# Patient Record
Sex: FEMALE | Race: WHITE | NOT HISPANIC OR LATINO | Employment: OTHER | URBAN - METROPOLITAN AREA
[De-identification: names, ages, dates, MRNs, and addresses within clinical notes are randomized per-mention and may not be internally consistent; named-entity substitution may affect disease eponyms.]

---

## 2017-06-02 ENCOUNTER — OFFICE VISIT (OUTPATIENT)
Dept: CARDIOLOGY | Facility: CLINIC | Age: 77
End: 2017-06-02
Attending: INTERNAL MEDICINE
Payer: COMMERCIAL

## 2017-06-02 VITALS
HEIGHT: 60 IN | HEART RATE: 72 BPM | WEIGHT: 110.4 LBS | SYSTOLIC BLOOD PRESSURE: 177 MMHG | BODY MASS INDEX: 21.68 KG/M2 | DIASTOLIC BLOOD PRESSURE: 72 MMHG

## 2017-06-02 DIAGNOSIS — I10 ESSENTIAL HYPERTENSION WITH GOAL BLOOD PRESSURE LESS THAN 140/90: Primary | ICD-10-CM

## 2017-06-02 DIAGNOSIS — I49.49 PREMATURE BEATS: ICD-10-CM

## 2017-06-02 PROCEDURE — 99214 OFFICE O/P EST MOD 30 MIN: CPT | Mod: 25 | Performed by: PHYSICIAN ASSISTANT

## 2017-06-02 PROCEDURE — 93000 ELECTROCARDIOGRAM COMPLETE: CPT | Performed by: PHYSICIAN ASSISTANT

## 2017-06-02 RX ORDER — ATENOLOL 50 MG/1
50 TABLET ORAL 2 TIMES DAILY
Qty: 180 TABLET | Refills: 3 | Status: SHIPPED | OUTPATIENT
Start: 2017-06-02 | End: 2018-08-27

## 2017-06-02 NOTE — PROGRESS NOTES
HPI and Plan:   See dictation #[id number]    Orders Placed This Encounter   Procedures     EKG 12-lead complete w/read - Clinics       Orders Placed This Encounter   Medications     atenolol (TENORMIN) 50 MG tablet     Sig: Take 1 tablet (50 mg) by mouth 2 times daily     Dispense:  180 tablet     Refill:  3       Medications Discontinued During This Encounter   Medication Reason     atenolol (TENORMIN) 50 MG tablet Reorder       Encounter Diagnoses   Name Primary?     Premature beats      Essential hypertension with goal blood pressure less than 140/90 Yes       CURRENT MEDICATIONS:  Current Outpatient Prescriptions   Medication Sig Dispense Refill     atenolol (TENORMIN) 50 MG tablet Take 1 tablet (50 mg) by mouth 2 times daily 180 tablet 3     Calcium Carbonate-Vitamin D (CALCIUM + D PO) Take  by mouth.       Cholecalciferol (VITAMIN D PO) Take  by mouth.       [DISCONTINUED] atenolol (TENORMIN) 50 MG tablet Take 1 tablet (50 mg) by mouth daily 90 tablet 3       ALLERGIES     Allergies   Allergen Reactions     Sulfa Drugs Rash     Hctz      Cough, dehydration, CROSS-REACTION with sulfa allergy       Darvon [Propoxyphene Hcl] Other (See Comments)     Stiff neck         PAST MEDICAL HISTORY:  Past Medical History:   Diagnosis Date     Arm fracture, right 12/11     Essential hypertension, benign      Hypertension      PAC (premature atrial contraction)      Pneumonia 1988     PVC (premature ventricular contraction)      Recurrent Otitis media        PAST SURGICAL HISTORY:  Past Surgical History:   Procedure Laterality Date     OPEN REDUCTION INTERNAL FIXATION HUMERUS PROXIMAL  12/27/2011    Procedure:OPEN REDUCTION INTERNAL FIXATION HUMERUS PROXIMAL; OPEN REDUCTION INTERNAL FIXATION RIGHT PROXIMAL HUMERUS ; Surgeon:OCTAVIO WRIGHT; Location:[facility] OR     TONSILLECTOMY  as child       FAMILY HISTORY:  Family History   Problem Relation Age of Onset     CEREBROVASCULAR DISEASE Mother      age 83     CEREBROVASCULAR DISEASE  Father      age  82       SOCIAL HISTORY:  Social History     Social History     Marital status:      Spouse name: N/A     Number of children: N/A     Years of education: N/A     Occupational History     retired      Social History Main Topics     Smoking status: Never Smoker     Smokeless tobacco: Never Used     Alcohol use No     Drug use: No     Sexual activity: Not Currently     Partners: Male     Other Topics Concern     None     Social History Narrative       Review of Systems:  Skin:  Negative       Eyes:  Positive for glasses    ENT:  Negative      Respiratory:  Negative for dyspnea on exertion;shortness of breath     Cardiovascular:  Negative for;chest pain;syncope or near-syncope;exercise intolerance;fatigue;lightheadedness;dizziness Positive for;palpitations;edema Occasional L LE edema  Gastroenterology: Negative      Genitourinary:  Negative      Musculoskeletal:  Negative      Neurologic:  Negative      Psychiatric:  Positive for excessive stress  dx'd with kidney disfunction  Heme/Lymph/Imm:  Negative      Endocrine:  Negative        Physical Exam:  Vitals: /72  Pulse 72  Ht 1.524 m (5')  Wt 50.1 kg (110 lb 6.4 oz)  BMI 21.56 kg/m2    Constitutional:  cooperative, alert and oriented, well developed, well nourished, in no acute distress        Skin:  warm and dry to the touch   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)    Head:  normocephalic, no masses or lesions        Eyes:  pupils equal and round;conjunctivae and lids unremarkable;sclera white        ENT:  no pallor or cyanosis, dentition good        Neck:  JVP normal;no carotid bruit        Chest:  normal breath sounds, clear to auscultation, normal A-P diameter, normal symmetry, normal respiratory excursion, no use of accessory muscles          Cardiac: regular rhythm, normal S1/S2, no S3 or S4, apical impulse not displaced, no murmurs, gallops or rubs                  Abdomen:  abdomen soft        Vascular: pulses full and  equal                                        Extremities and Back:  no deformities, clubbing, cyanosis, erythema observed;no edema              Neurological:  affect appropriate, oriented to time, person and place

## 2017-06-02 NOTE — LETTER
6/2/2017     [facility] [place]    RE: Karen Sanchez       Dear Colleague,    I had the pleasure of meeting Karen today when she came for annual followup.  She is a very pleasant 76-year-old who has a history of symptomatic PACs and PVCs, well controlled on atenolol.  She saw Dr. Jackson 1 year ago, at which time he recommended discontinuation of aspirin and no other medication changes were made.      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  She has had a Mohs procedure and is going back to Dermatology in 6 months for a full body scan.      She thinks her palpitations continue to remain under great control on atenolol.  She denies any problems with orthopnea or PND but does occasionally note some left lower extremity edema.  She denies chest pain, pressure or tightness.  She remains quite active without limitation.  She enjoys sewing and states that she has really done pretty well this past year; however, she notes that she has been stressed this spring with her cancer diagnosis and some trouble with her  having some bursitis.        EKG today, which I overread, confirms sinus rhythm at 71 beats per minute with normal intervals.      Stress echocardiogram done 12/2011 was negative for wall motion abnormalities.  Last echo was done in 08/2007 showing an EF of 55%-60%.     Outpatient Encounter Prescriptions as of 6/2/2017   Medication Sig Dispense Refill     atenolol (TENORMIN) 50 MG tablet Take 1 tablet (50 mg) by mouth 2 times daily 180 tablet 3     Calcium Carbonate-Vitamin D (CALCIUM + D PO) Take  by mouth.       Cholecalciferol (VITAMIN D PO) Take  by mouth.       [DISCONTINUED] atenolol (TENORMIN) 50 MG tablet Take 1 tablet (50 mg) by mouth daily 90 tablet 3     No facility-administered encounter medications on file as of 6/2/2017.       ASSESSMENT AND PLAN:     1.  Ectopy.  She thinks the atenolol 50 mg daily is working well to control her PACs and PVCs.  She  has never been diagnosed to have anything more concerning such as atrial fibrillation.  She will continue to monitor.     2.  Hypertension.  Blood pressure remains elevated when we rechecked it in clinic.  At home she is getting anywhere   from the 120-160s, with the vast majority of them in the 140s.  At this time, we will start by increasing her atenolol.  She is nervous to double it completely, given that she does get some fatigue with atenolol (which is why she takes it at night).  At this time, we will start with just 25 mg in the morning and 50 mg in the evening.  She will call me if she has any problems, certainly, but definitely will call me in 2 weeks with an update on blood pressures.      If blood pressures remain elevated in 2 weeks ,and she is feeling well, we could potentially increase atenolol to 50 mg twice daily.  If she does not feel well on the higher dose of atenolol, we can decrease her back to 50 mg in the evening and initiate a different antihypertensive in the morning such as lisinopril.  In the past she had problems with hydrochlorothiazide given her history of sulfa allergy, and we would certainly use something other than a diuretic.      It has been a pleasure to see her in clinic.  Dr. Jackson will see her in a year, but I may see her earlier depending on blood pressure.     Sincerely,    Saundra Remy PA-C     [facility]

## 2017-06-02 NOTE — MR AVS SNAPSHOT
After Visit Summary   6/2/2017    Karen Sanchez    MRN: [id number]           Patient Information     Date Of Birth          1940        Visit Information        Provider Department      6/2/2017 9:30 AM Saundra Remy PA-C [facility]        Today's Diagnoses     Essential hypertension with goal blood pressure less than 140/90    -  1    Premature beats          Care Instructions    1. EKG today looked great! Normal rhythm and no skipped/premature beats    2. BP high today!  As you're getting BPs 140s at home, will INCREASE atenolol.   Take 1/2 tab in AM (25 mg) and 1 tab in PM (50 mg) x 2 weeks. New Rx sent       3. Call my nurse the week of 6/12 or 6/19 and give update on BP readings.  Call earlier if any problems on the higher dose!  Lyndsey: [phone].  If BP still high and feeling OK, will increase atenolol again.  If not feeling OK or BPs not well controlled, will add medicine (not HCTZ)    4. See Dr. Jackson in 1 year but I might see you back for BP in the meantime.          Follow-ups after your visit        Who to contact     If you have questions or need follow up information about today's clinic visit or your schedule please contact [facility] directly at [phone].  Normal or non-critical lab and imaging results will be communicated to you by [x+1]hart, letter or phone within 4 business days after the clinic has received the results. If you do not hear from us within 7 days, please contact the clinic through for; to (do)t or phone. If you have a critical or abnormal lab result, we will notify you by phone as soon as possible.  Submit refill requests through [employer] or call your pharmacy and they will forward the refill request to us. Please allow 3 business days for your refill to be completed.          Additional Information About Your Visit        [x+1][employer] Information     [employer] lets you send  "messages to your doctor, view your test results, renew your prescriptions, schedule appointments and more. To sign up, go to [email or web address] . Click on \"Log in\" on the left side of the screen, which will take you to the Welcome page. Then click on \"Sign up Now\" on the right side of the page.     You will be asked to enter the access code listed below, as well as some personal information. Please follow the directions to create your username and password.     Your access code is: [email or web address]  Expires: 2017  9:57 AM     Your access code will  in 90 days. If you need help or a new code, please call your [place] clinic or [phone].        Care EveryWhere ID     This is your Care EveryWhere ID. This could be used by other organizations to access your [place] medical records  [id number]        Your Vitals Were     Pulse Height BMI (Body Mass Index)             70 1.524 m (5') 21.56 kg/m2          Blood Pressure from Last 3 Encounters:   17 182/60   16 130/74   16 (!) 160/100    Weight from Last 3 Encounters:   17 50.1 kg (110 lb 6.4 oz)   16 50.8 kg (112 lb)   16 51.7 kg (114 lb)              We Performed the Following     EKG 12-lead complete w/read - Clinics     Follow-Up with Cardiac Advanced Practice Provider          Today's Medication Changes          These changes are accurate as of: 17  9:57 AM.  If you have any questions, ask your nurse or doctor.               These medicines have changed or have updated prescriptions.        Dose/Directions    atenolol 50 MG tablet   Commonly known as:  TENORMIN   This may have changed:  when to take this   Used for:  Essential hypertension with goal blood pressure less than 140/90   Changed by:  Saundra Remy PA-C        Dose:  50 mg   Take 1 tablet (50 mg) by mouth 2 times daily   Quantity:  180 tablet   Refills:  3            Where to get your medicines      These medications were sent to " [id number] - [place], [place] - [facility] [facility] [address]  [address] [address], [facility] [address]     Phone:  [phone]     atenolol 50 MG tablet                Primary Care Provider    [facility] [place]       No address on file        Thank you!     Thank you for choosing [facility] [place]  for your care. Our goal is always to provide you with excellent care. Hearing back from our patients is one way we can continue to improve our services. Please take a few minutes to complete the written survey that you may receive in the mail after your visit with us. Thank you!             Your Updated Medication List - Protect others around you: Learn how to safely use, store and throw away your medicines at www.disposemymeds.org.          This list is accurate as of: 6/2/17  9:57 AM.  Always use your most recent med list.                   Brand Name Dispense Instructions for use    atenolol 50 MG tablet    TENORMIN    180 tablet    Take 1 tablet (50 mg) by mouth 2 times daily       CALCIUM + D PO      Take  by mouth.       VITAMIN D PO      Take  by mouth.

## 2017-06-02 NOTE — PATIENT INSTRUCTIONS
1. EKG today looked great! Normal rhythm and no skipped/premature beats    2. BP high today!  As you're getting BPs 140s at home, will INCREASE atenolol.   Take 1/2 tab in AM (25 mg) and 1 tab in PM (50 mg) x 2 weeks. New Rx sent       3. Call my nurse the week of 6/12 or 6/19 and give update on BP readings.  Call earlier if any problems on the higher dose!  Lyndsey: [phone].  If BP still high and feeling OK, will increase atenolol again.  If not feeling OK or BPs not well controlled, will add medicine (not HCTZ)    4. See Dr. Jackson in 1 year but I might see you back for BP in the meantime.

## 2017-06-03 NOTE — PROGRESS NOTES
HISTORY OF PRESENT ILLNESS:  I had the pleasure of meeting Karen today when she came for annual followup.  She is a very pleasant 76-year-old who has a history of symptomatic PACs and PVCs, well controlled on atenolol.  She saw Dr. Jackson 1 year ago, at which time he recommended discontinuation of aspirin and no other medication changes were made.      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  She has had a Mohs procedure and is going back to Dermatology in 6 months for a full body scan.      She thinks her palpitations continue to remain under great control on atenolol.  She denies any problems with orthopnea or PND but does occasionally note some left lower extremity edema.  She denies chest pain, pressure or tightness.  She remains quite active without limitation.  She enjoys sewing and states that she has really done pretty well this past year; however, she notes that she has been stressed this spring with her cancer diagnosis and some trouble with her  having some bursitis.        EKG today, which I overread, confirms sinus rhythm at 71 beats per minute with normal intervals.      Stress echocardiogram done 12/2011 was negative for wall motion abnormalities.  Last echo was done in 08/2007 showing an EF of 55%-60%.        ASSESSMENT AND PLAN:     1.  Ectopy.  She thinks the atenolol 50 mg daily is working well to control her PACs and PVCs.  She has never been diagnosed to have anything more concerning such as atrial fibrillation.  She will continue to monitor.     2.  Hypertension.  Blood pressure remains elevated when we rechecked it in clinic.  At home she is getting anywhere   from the 120-160s, with the vast majority of them in the 140s.  At this time, we will start by increasing her atenolol.  She is nervous to double it completely, given that she does get some fatigue with atenolol (which is why she takes it at night).  At this time, we will start with just 25 mg  in the morning and 50 mg in the evening.  She will call me if she has any problems, certainly, but definitely will call me in 2 weeks with an update on blood pressures.      If blood pressures remain elevated in 2 weeks ,and she is feeling well, we could potentially increase atenolol to 50 mg twice daily.  If she does not feel well on the higher dose of atenolol, we can decrease her back to 50 mg in the evening and initiate a different antihypertensive in the morning such as lisinopril.  In the past she had problems with hydrochlorothiazide given her history of sulfa allergy, and we would certainly use something other than a diuretic.      It has been a pleasure to see her in clinic.  Dr. Jackson will see her in a year, but I may see her earlier depending on blood pressure.         CY SANCHEZ PA-C             D: 2017 10:13   T: 2017 20:16   MT: REBECCA      Name:     PROMISE COLLIER   MRN:      [id number]        Account:      [id number]   :      1940           Service Date: 2017      Document: [id number]

## 2017-06-09 ENCOUNTER — RADIANT APPOINTMENT (OUTPATIENT)
Dept: GENERAL RADIOLOGY | Facility: CLINIC | Age: 77
End: 2017-06-09
Attending: PHYSICIAN ASSISTANT
Payer: COMMERCIAL

## 2017-06-09 ENCOUNTER — OFFICE VISIT (OUTPATIENT)
Dept: FAMILY MEDICINE | Facility: CLINIC | Age: 77
End: 2017-06-09
Payer: COMMERCIAL

## 2017-06-09 VITALS
WEIGHT: 110 LBS | BODY MASS INDEX: 21.48 KG/M2 | SYSTOLIC BLOOD PRESSURE: 151 MMHG | TEMPERATURE: 97.7 F | OXYGEN SATURATION: 98 % | HEART RATE: 65 BPM | DIASTOLIC BLOOD PRESSURE: 68 MMHG | RESPIRATION RATE: 16 BRPM

## 2017-06-09 DIAGNOSIS — M79.672 LEFT FOOT PAIN: ICD-10-CM

## 2017-06-09 DIAGNOSIS — I10 HYPERTENSION GOAL BP (BLOOD PRESSURE) < 140/90: ICD-10-CM

## 2017-06-09 DIAGNOSIS — Z12.11 SCREEN FOR COLON CANCER: ICD-10-CM

## 2017-06-09 DIAGNOSIS — L03.116 CELLULITIS OF LEFT LOWER EXTREMITY: ICD-10-CM

## 2017-06-09 DIAGNOSIS — M79.672 LEFT FOOT PAIN: Primary | ICD-10-CM

## 2017-06-09 PROCEDURE — 73630 X-RAY EXAM OF FOOT: CPT | Mod: LT

## 2017-06-09 PROCEDURE — 99214 OFFICE O/P EST MOD 30 MIN: CPT | Performed by: PHYSICIAN ASSISTANT

## 2017-06-09 RX ORDER — CEPHALEXIN 500 MG/1
500 CAPSULE ORAL 4 TIMES DAILY
Qty: 28 CAPSULE | Refills: 0 | Status: SHIPPED | OUTPATIENT
Start: 2017-06-09 | End: 2017-06-16

## 2017-06-09 NOTE — NURSING NOTE
Chief Complaint   Patient presents with     Musculoskeletal Problem       Initial /68 (Cuff Size: Adult Regular)  Pulse 65  Temp 97.7  F (36.5  C) (Tympanic)  Resp 16  Wt 110 lb (49.9 kg)  SpO2 98%  BMI 21.48 kg/m2 Estimated body mass index is 21.48 kg/(m^2) as calculated from the following:    Height as of 6/2/17: 5' (1.524 m).    Weight as of this encounter: 110 lb (49.9 kg).  Medication Reconciliation: complete   Lidia Quinn, CMA

## 2017-06-09 NOTE — PROGRESS NOTES
SUBJECTIVE:                                                    Karen Sanchez is a 76 year old female who presents to clinic today for the following health issues:      Musculoskeletal problem/pain      Duration: 3 days     Description  Location: left foot pain    Intensity:  moderate    Accompanying signs and symptoms: swelling    History  Previous similar problem: YES- several years ago  Previous evaluation:  none    Precipitating or alleviating factors:  Trauma or overuse: no   Aggravating factors include: standing and walking    Therapies tried and outcome: immobilization       Sudden onset of left midfoot pain, swelling and redness that began 3 days ago. No known injury.  Pain is worse with standing and walking, improves with rest. Patient is concerned today for stress fracture.     Problem list and histories reviewed & adjusted, as indicated.  Additional history: as documented    Patient Active Problem List   Diagnosis     CARDIOVASCULAR SCREENING; LDL GOAL LESS THAN 130     Foot fracture, right     Fracture, humerus, right     Hypertension goal BP (blood pressure) < 140/90     Arm fracture, right     Contracture of right elbow     Osteoporosis     SVT (supraventricular tachycardia) (H)     PVC (premature ventricular contraction)     Hypertension     Aortic valve disorder     Past Surgical History:   Procedure Laterality Date     OPEN REDUCTION INTERNAL FIXATION HUMERUS PROXIMAL  12/27/2011    Procedure:OPEN REDUCTION INTERNAL FIXATION HUMERUS PROXIMAL; OPEN REDUCTION INTERNAL FIXATION RIGHT PROXIMAL HUMERUS ; Surgeon:OCTAVIO WRIGHT; Location:[facility]     TONSILLECTOMY  as child       Social History   Substance Use Topics     Smoking status: Never Smoker     Smokeless tobacco: Never Used     Alcohol use No     Family History   Problem Relation Age of Onset     CEREBROVASCULAR DISEASE Mother      age 83     CEREBROVASCULAR DISEASE Father      age  82         Current Outpatient Prescriptions   Medication  Sig Dispense Refill     cephALEXin (KEFLEX) 500 MG capsule Take 1 capsule (500 mg) by mouth 4 times daily for 7 days 28 capsule 0     atenolol (TENORMIN) 50 MG tablet Take 1 tablet (50 mg) by mouth 2 times daily 180 tablet 3     Calcium Carbonate-Vitamin D (CALCIUM + D PO) Take  by mouth.       Cholecalciferol (VITAMIN D PO) Take  by mouth.       Allergies   Allergen Reactions     Sulfa Drugs Rash     Hctz      Cough, dehydration, CROSS-REACTION with sulfa allergy       Darvon [Propoxyphene Hcl] Other (See Comments)     Stiff neck         Reviewed and updated as needed this visit by clinical staff       Reviewed and updated as needed this visit by Provider         ROS:  Constitutional, HEENT, cardiovascular, pulmonary, GI, , musculoskeletal, neuro, skin, endocrine and psych systems are negative, except as otherwise noted.    OBJECTIVE:                                                    /68 (Cuff Size: Adult Regular)  Pulse 65  Temp 97.7  F (36.5  C) (Tympanic)  Resp 16  Wt 110 lb (49.9 kg)  SpO2 98%  BMI 21.48 kg/m2  Body mass index is 21.48 kg/(m^2).  GENERAL: healthy, alert and no distress  MS: mild 1 + pitting edema noted to left ankle,  2x2 area of erythema and swelling along left midfoot with associated TTP, no ankle TTP.  FROM of left ankle and toes without pain, 5/5 strength of left ankle and toes, full left pedal pulses  SKIN: See above  NEURO: Normal strength and tone, mentation intact and speech normal  PSYCH: mentation appears normal, affect normal/bright    Diagnostic Test Results:  none      ASSESSMENT/PLAN:                                                        1. Cellulitis of left lower extremity  Examination today is consistent with cellulitis of dorsum of left foot.  No tenderness along the ankle to suggest intraarticular involvement or gout.  X ray done today and was reviewed with patient present in the office.  No evidence of acute fracture.  I have advised that patient begin keflex  and follow up early next week if no improvement, may consider advanced imaging at that time to further assess bones of foot if no improvement  - cephALEXin (KEFLEX) 500 MG capsule; Take 1 capsule (500 mg) by mouth 4 times daily for 7 days  Dispense: 28 capsule; Refill: 0    2. Left foot pain  - XR Foot Left G/E 3 Views; Future    3. Hypertension goal BP (blood pressure) < 140/90  Repeat is 150/90, patient is following with cardiology and has been advised by cardiologist to call her in 1 week with readings.  She will follow up with this    4. Screen for colon cancer  - Fecal colorectal cancer screen (FIT); Future    See Patient Instructions    Saurabh Garza PA-C  [facility]

## 2017-06-09 NOTE — PATIENT INSTRUCTIONS
What Is High Blood Pressure?  High blood pressure (also called hypertension) is known as the  silent killer.  This is because most of the time it doesn t cause symptoms. In fact, many people don t know they have it until other problems develop. In most cases, high blood pressure can t be cured. It s a disease that requires lifelong treatment. The good news is that it CAN be managed.  Understanding blood pressure  The circulatory system is made up of the heart and blood vessels that carry blood through the body. Your heart is the pump for this system. With each heartbeat (contraction), the heart sends blood out through large blood vessels called arteries. Blood pressure is a measure of how hard the moving blood pushes against the walls of the arteries.          High blood pressure can harm your health  High blood pressure makes the heart work harder to pump blood. Frequent high blood pressure can also cause changes in the artery walls. The walls thicken and become rough, which leads to a buildup of plaque (a fatty material). This can damage the arteries. It can also reduce blood flow through the artery. If blood pressure is not controlled, all these effects can lead to serious health problems. These include heart disease, heart attack (also known as acute myocardial infarction, or AMI), stroke, kidney disease, and blindness.  Measuring blood pressure  An example of a blood pressure measurement is 120/70 (120 over 70). The top number is the pressure of blood against the artery walls during a heartbeat (systolic). The bottom number is the pressure of blood against artery walls between heartbeats (diastolic). Talk with your health care provider to find out what your blood pressure goals should be.   Controlling blood pressure  If your blood pressure is too high, work with your doctor on a plan for lowering it. Below are steps you can take that will help lower your blood pressure.    Choose heart-healthy foods.  Eating healthier meals helps you control your blood pressure. Ask your doctor about the DASH eating plan. This plan helps reduce blood pressure by limiting the amount of sodium (salt) you have in your diet.     Maintain a healthy weight. Being overweight makes you more likely to have high blood pressure. Losing excess weight helps lower blood pressure.    Exercise regularly. Daily exercise helps your heart and blood vessels work better and stay healthier. It can help lower your blood pressure.    Stop smoking. Smoking increases blood pressure and damages blood vessels.    Limit alcohol. Drinking too much alcohol can raise blood pressure. Men should have no more than 2 drinks a day. Women should have no more than 1. (A drink is equal to 1 beer, or a small glass of wine, or a shot of liquor.)    Control stress. Stress makes your heart work harder and beat faster. Controlling stress helps you control your blood pressure.  Facts about high blood pressure    Feeling OK does not mean that blood pressure is under control. Likewise, feeling bad doesn t mean it s out of control. The only way to know for sure is to check your pressure regularly.    Medication is only one part of controlling high blood pressure. You also need to manage your weight, get regular exercise, and adjust your eating habits.    High blood pressure is usually a lifelong problem. But it can be controlled with healthy lifestyle changes and medication.    Hypertension is not the same as stress. Although stress may be a factor in high blood pressure, it s only one part of the story.    Blood pressure medications need to be taken every day. Stopping suddenly may cause a dangerous increase in pressure.     1558-3539 [employer]. [address] [place] [address], [place] [address]. All rights reserved. This information is not intended as a substitute for professional medical care. Always follow your healthcare professional's instructions.

## 2017-06-09 NOTE — MR AVS SNAPSHOT
After Visit Summary   6/9/2017    Karen Sanchez    MRN: [id number]           Patient Information     Date Of Birth          1940        Visit Information        Provider Department      6/9/2017 9:20 AM Saurabh Garza PA-C [facility] [place]        Today's Diagnoses     Left foot pain    -  1    Cellulitis of left lower extremity        Hypertension goal BP (blood pressure) < 140/90        Screen for colon cancer          Care Instructions        What Is High Blood Pressure?  High blood pressure (also called hypertension) is known as the  silent killer.  This is because most of the time it doesn t cause symptoms. In fact, many people don t know they have it until other problems develop. In most cases, high blood pressure can t be cured. It s a disease that requires lifelong treatment. The good news is that it CAN be managed.  Understanding blood pressure  The circulatory system is made up of the heart and blood vessels that carry blood through the body. Your heart is the pump for this system. With each heartbeat (contraction), the heart sends blood out through large blood vessels called arteries. Blood pressure is a measure of how hard the moving blood pushes against the walls of the arteries.          High blood pressure can harm your health  High blood pressure makes the heart work harder to pump blood. Frequent high blood pressure can also cause changes in the artery walls. The walls thicken and become rough, which leads to a buildup of plaque (a fatty material). This can damage the arteries. It can also reduce blood flow through the artery. If blood pressure is not controlled, all these effects can lead to serious health problems. These include heart disease, heart attack (also known as acute myocardial infarction, or AMI), stroke, kidney disease, and blindness.  Measuring blood pressure  An example of a blood pressure measurement is 120/70 (120 over 70). The top number  is the pressure of blood against the artery walls during a heartbeat (systolic). The bottom number is the pressure of blood against artery walls between heartbeats (diastolic). Talk with your health care provider to find out what your blood pressure goals should be.   Controlling blood pressure  If your blood pressure is too high, work with your doctor on a plan for lowering it. Below are steps you can take that will help lower your blood pressure.    Choose heart-healthy foods. Eating healthier meals helps you control your blood pressure. Ask your doctor about the DASH eating plan. This plan helps reduce blood pressure by limiting the amount of sodium (salt) you have in your diet.     Maintain a healthy weight. Being overweight makes you more likely to have high blood pressure. Losing excess weight helps lower blood pressure.    Exercise regularly. Daily exercise helps your heart and blood vessels work better and stay healthier. It can help lower your blood pressure.    Stop smoking. Smoking increases blood pressure and damages blood vessels.    Limit alcohol. Drinking too much alcohol can raise blood pressure. Men should have no more than 2 drinks a day. Women should have no more than 1. (A drink is equal to 1 beer, or a small glass of wine, or a shot of liquor.)    Control stress. Stress makes your heart work harder and beat faster. Controlling stress helps you control your blood pressure.  Facts about high blood pressure    Feeling OK does not mean that blood pressure is under control. Likewise, feeling bad doesn t mean it s out of control. The only way to know for sure is to check your pressure regularly.    Medication is only one part of controlling high blood pressure. You also need to manage your weight, get regular exercise, and adjust your eating habits.    High blood pressure is usually a lifelong problem. But it can be controlled with healthy lifestyle changes and medication.    Hypertension is not the  "same as stress. Although stress may be a factor in high blood pressure, it s only one part of the story.    Blood pressure medications need to be taken every day. Stopping suddenly may cause a dangerous increase in pressure.     8462-7152 [employer]. [address] [place] [address]. All rights reserved. This information is not intended as a substitute for professional medical care. Always follow your healthcare professional's instructions.                Follow-ups after your visit        Follow-up notes from your care team     Return for Primary Care Provider Blood Pressure Follow-up at next scheduled exam.      Future tests that were ordered for you today     Open Future Orders        Priority Expected Expires Ordered    Fecal colorectal cancer screen (FIT) Routine 6/30/2017 9/1/2017 6/9/2017            Who to contact     If you have questions or need follow up information about today's clinic visit or your schedule please contact [facility] directly at [phone].  Normal or non-critical lab and imaging results will be communicated to you by RentPosthart, letter or phone within 4 business days after the clinic has received the results. If you do not hear from us within 7 days, please contact the clinic through RentPosthart or phone. If you have a critical or abnormal lab result, we will notify you by phone as soon as possible.  Submit refill requests through Advise Only or call your pharmacy and they will forward the refill request to us. Please allow 3 business days for your refill to be completed.          Additional Information About Your Visit        RentPosthart Information     Advise Only lets you send messages to your doctor, view your test results, renew your prescriptions, schedule appointments and more. To sign up, go to [email or web address] . Click on \"Log in\" on the left side of the screen, which will take you to the Welcome page. Then click on \"Sign up Now\" on the right " side of the page.     You will be asked to enter the access code listed below, as well as some personal information. Please follow the directions to create your username and password.     Your access code is: [email or web address]  Expires: 2017  9:57 AM     Your access code will  in 90 days. If you need help or a new code, please call your [facility] or [phone].        Care EveryWhere ID     This is your Care EveryWhere ID. This could be used by other organizations to access your [place] medical records  [id number]        Your Vitals Were     Pulse Temperature Respirations Pulse Oximetry BMI (Body Mass Index)       65 97.7  F (36.5  C) (Tympanic) 16 98% 21.48 kg/m2        Blood Pressure from Last 3 Encounters:   17 151/68   17 177/72   16 130/74    Weight from Last 3 Encounters:   17 110 lb (49.9 kg)   17 110 lb 6.4 oz (50.1 kg)   16 112 lb (50.8 kg)                 Today's Medication Changes          These changes are accurate as of: 17  1:31 PM.  If you have any questions, ask your nurse or doctor.               Start taking these medicines.        Dose/Directions    cephALEXin 500 MG capsule   Commonly known as:  KEFLEX   Used for:  Cellulitis of left lower extremity   Started by:  Saurabh Garza PA-C        Dose:  500 mg   Take 1 capsule (500 mg) by mouth 4 times daily for 7 days   Quantity:  28 capsule   Refills:  0            Where to get your medicines      These medications were sent to [facility] [address] [place] [address] AT [facility] OF  [address]  [address] [place] [id number]     Phone:  [phone]     cephALEXin 500 MG capsule                Primary Care Provider     Marjorie Mccormack MD       No address on file        Thank you!     Thank you for choosing [facility]  for your care. Our goal is always to provide you with excellent care. Hearing back from our patients is  one way we can continue to improve our services. Please take a few minutes to complete the written survey that you may receive in the mail after your visit with us. Thank you!             Your Updated Medication List - Protect others around you: Learn how to safely use, store and throw away your medicines at www.disposemymeds.org.          This list is accurate as of: 6/9/17  1:31 PM.  Always use your most recent med list.                   Brand Name Dispense Instructions for use    atenolol 50 MG tablet    TENORMIN    180 tablet    Take 1 tablet (50 mg) by mouth 2 times daily       CALCIUM + D PO      Take  by mouth.       cephALEXin 500 MG capsule    KEFLEX    28 capsule    Take 1 capsule (500 mg) by mouth 4 times daily for 7 days       VITAMIN D PO      Take  by mouth.

## 2017-06-17 PROCEDURE — 82274 ASSAY TEST FOR BLOOD FECAL: CPT | Performed by: PHYSICIAN ASSISTANT

## 2017-06-20 ENCOUNTER — OFFICE VISIT (OUTPATIENT)
Dept: FAMILY MEDICINE | Facility: CLINIC | Age: 77
End: 2017-06-20
Payer: COMMERCIAL

## 2017-06-20 VITALS
TEMPERATURE: 97.9 F | HEIGHT: 60 IN | HEART RATE: 66 BPM | DIASTOLIC BLOOD PRESSURE: 60 MMHG | BODY MASS INDEX: 21.52 KG/M2 | RESPIRATION RATE: 16 BRPM | OXYGEN SATURATION: 97 % | WEIGHT: 109.6 LBS | SYSTOLIC BLOOD PRESSURE: 130 MMHG

## 2017-06-20 DIAGNOSIS — R19.5 POSITIVE FIT (FECAL IMMUNOCHEMICAL TEST): ICD-10-CM

## 2017-06-20 DIAGNOSIS — Z12.11 SCREEN FOR COLON CANCER: ICD-10-CM

## 2017-06-20 DIAGNOSIS — M79.672 LEFT FOOT PAIN: Primary | ICD-10-CM

## 2017-06-20 DIAGNOSIS — I10 HYPERTENSION GOAL BP (BLOOD PRESSURE) < 140/90: ICD-10-CM

## 2017-06-20 LAB — HEMOCCULT STL QL IA: POSITIVE

## 2017-06-20 PROCEDURE — 99214 OFFICE O/P EST MOD 30 MIN: CPT | Performed by: PHYSICIAN ASSISTANT

## 2017-06-20 NOTE — NURSING NOTE
Chief Complaint   Patient presents with     Follow Up For     last ov 06/09/17 lt foot pain       Initial /68 (BP Location: Left arm, Patient Position: Chair, Cuff Size: Adult Regular)  Pulse 66  Temp 97.9  F (36.6  C) (Tympanic)  Resp 16  Ht 5' (1.524 m)  Wt 109 lb 9.6 oz (49.7 kg)  SpO2 97%  BMI 21.4 kg/m2 Estimated body mass index is 21.4 kg/(m^2) as calculated from the following:    Height as of this encounter: 5' (1.524 m).    Weight as of this encounter: 109 lb 9.6 oz (49.7 kg).  Medication Reconciliation: complete    Lorraine Young MA

## 2017-06-20 NOTE — PROGRESS NOTES
SUBJECTIVE:                                                    Karen Sanchez is a 76 year old female who presents to clinic today for the following health issues:      Concern - F/U for Lt foot pain     Onset: 06/09/17    Description:   Pt reports foot is getting better. continued to have pain directly over bones of midfoot, redness and swelling have improved    Intensity: mild    Progression of Symptoms:  improving    Accompanying Signs & Symptoms:  Some swelling and pain but it is getting better       Previous history of similar problem:     Precipitating factors:   Worsened by:    Alleviating factors:  Improved by:        Therapies Tried and outcome: keflex: improved redness, pain continues      Karen presented 1 week ago for pain and redness along her left midfoot, x ray was negative at that time and it was thought that she had cellulitis.  She was treated with keflex which improved the redness and the swelling, however she continued to experience pain directly over bones of  Left midfoot.  This is painful at rest and with walking.  No injury noted        Fit test came back positive today, she needs referral for colonoscopy      Hypertension:  Following with Dr. Remy in cardiology. Initially elevated today, with repeat /60 in office. On atenolol 50 mg without SE.  Has plans to call Dr. Remy this week with results and further management.  Home Bps have been well controlled    Problem list and histories reviewed & adjusted, as indicated.  Additional history: as documented    Patient Active Problem List   Diagnosis     CARDIOVASCULAR SCREENING; LDL GOAL LESS THAN 130     Foot fracture, right     Fracture, humerus, right     Hypertension goal BP (blood pressure) < 140/90     Arm fracture, right     Contracture of right elbow     Osteoporosis     SVT (supraventricular tachycardia) (H)     PVC (premature ventricular contraction)     Hypertension     Aortic valve disorder     Past Surgical History:    Procedure Laterality Date     OPEN REDUCTION INTERNAL FIXATION HUMERUS PROXIMAL  12/27/2011    Procedure:OPEN REDUCTION INTERNAL FIXATION HUMERUS PROXIMAL; OPEN REDUCTION INTERNAL FIXATION RIGHT PROXIMAL HUMERUS ; Surgeon:OCTAVIO WRIGHT; Location:[facility] OR     TONSILLECTOMY  as child       Social History   Substance Use Topics     Smoking status: Never Smoker     Smokeless tobacco: Never Used     Alcohol use No     Family History   Problem Relation Age of Onset     CEREBROVASCULAR DISEASE Mother      age 83     CEREBROVASCULAR DISEASE Father      age  82         Current Outpatient Prescriptions   Medication Sig Dispense Refill     atenolol (TENORMIN) 50 MG tablet Take 1 tablet (50 mg) by mouth 2 times daily 180 tablet 3     Calcium Carbonate-Vitamin D (CALCIUM + D PO) Take  by mouth.       Cholecalciferol (VITAMIN D PO) Take  by mouth.       Allergies   Allergen Reactions     Sulfa Drugs Rash     Hctz      Cough, dehydration, CROSS-REACTION with sulfa allergy       Darvon [Propoxyphene Hcl] Other (See Comments)     Stiff neck         Reviewed and updated as needed this visit by clinical staff       Reviewed and updated as needed this visit by Provider         ROS:  Constitutional, HEENT, cardiovascular, pulmonary, gi and gu systems are negative, except as otherwise noted.    OBJECTIVE:                                                    /60  Pulse 66  Temp 97.9  F (36.6  C) (Tympanic)  Resp 16  Ht 5' (1.524 m)  Wt 109 lb 9.6 oz (49.7 kg)  SpO2 97%  BMI 21.4 kg/m2  Body mass index is 21.4 kg/(m^2).  GENERAL: healthy, alert and no distress  MS: no gross musculoskeletal defects noted, mild edema of left midfoot with point TTP along midfoot.  FROM of toes and left ankle, full pedal pulses bilaterally  SKIN: no suspicious lesions or rashes  NEURO: Normal strength and tone, mentation intact and speech normal  PSYCH: mentation appears normal, affect normal/bright    Diagnostic Test Results:  none       ASSESSMENT/PLAN:                                                        1. Left foot pain  Given continued pain, without evidence of overlying infection, will get MRI to assess for occult fracture or underlying disease  - MR Foot Left w/o Contrast; Future    2. Positive FIT (fecal immunochemical test)  - GASTROENTEROLOGY ADULT REF PROCEDURE ONLY    3. Hypertension goal BP (blood pressure) < 140/90  She will call Dr. Remy this week with home readings, advise low salt and continue current medications.      See Patient Instructions    Saurabh Garza PA-C  [facility] [place]

## 2017-06-20 NOTE — MR AVS SNAPSHOT
After Visit Summary   6/20/2017    Karen Sanchez    MRN: [id number]           Patient Information     Date Of Birth          1940        Visit Information        Provider Department      6/20/2017 1:00 PM Saurabh Garza PA-C [facility] [place]        Today's Diagnoses     Left foot pain    -  1    Positive FIT (fecal immunochemical test)        Hypertension goal BP (blood pressure) < 140/90           Follow-ups after your visit        Your next 10 appointments already scheduled     Jun 20, 2017  3:00 PM CDT   LAB with [facility]   [facility] ([facility])    [address] [place] [id number] [phone]           OUTSIDE LABS:  Please include name of facility and Physician that is requesting outside labs be drawn.  Please indicate if labs are fasting or non-fasting on appt notes.  Be as specific as you can on which labs are being drawn.              Future tests that were ordered for you today     Open Future Orders        Priority Expected Expires Ordered    MR Foot Left w/o Contrast Routine  6/20/2018 6/20/2017            Who to contact     If you have questions or need follow up information about today's clinic visit or your schedule please contact [facility] [place] directly at [phone].  Normal or non-critical lab and imaging results will be communicated to you by MyChart, letter or phone within 4 business days after the clinic has received the results. If you do not hear from us within 7 days, please contact the clinic through MyChart or phone. If you have a critical or abnormal lab result, we will notify you by phone as soon as possible.  Submit refill requests through zEconomy or call your pharmacy and they will forward the refill request to us. Please allow 3 business days for your refill to be completed.          Additional Information About Your Visit        ParaEngine[employer]Plan B Acq[employer]itions Information     zE[employer]y lets  "you send messages to your doctor, view your test results, renew your prescriptions, schedule appointments and more. To sign up, go to [email or web address] . Click on \"Log in\" on the left side of the screen, which will take you to the Welcome page. Then click on \"Sign up Now\" on the right side of the page.     You will be asked to enter the access code listed below, as well as some personal information. Please follow the directions to create your username and password.     Your access code is: [email or web address]  Expires: 2017  9:57 AM     Your access code will  in 90 days. If you need help or a new code, please call your [place] clinic or [phone].        Care EveryWhere ID     This is your Care EveryWhere ID. This could be used by other organizations to access your [place] medical records  [id number]        Your Vitals Were     Pulse Temperature Respirations Height Pulse Oximetry BMI (Body Mass Index)    66 97.9  F (36.6  C) (Tympanic) 16 5' (1.524 m) 97% 21.4 kg/m2       Blood Pressure from Last 3 Encounters:   17 130/60   17 151/68   17 177/72    Weight from Last 3 Encounters:   17 109 lb 9.6 oz (49.7 kg)   17 110 lb (49.9 kg)   17 110 lb 6.4 oz (50.1 kg)               Primary Care Provider    [facility] [place]       No address on file        Thank you!     Thank you for choosing [facility] [place] [facility]  for your care. Our goal is always to provide you with excellent care. Hearing back from our patients is one way we can continue to improve our services. Please take a few minutes to complete the written survey that you may receive in the mail after your visit with us. Thank you!             Your Updated Medication List - Protect others around you: Learn how to safely use, store and throw away your medicines at www.disposemymeds.org.          This list is accurate as of: 17  1:33 PM.  Always use your most recent med list.                   Brand " Name Dispense Instructions for use    atenolol 50 MG tablet    TENORMIN    180 tablet    Take 1 tablet (50 mg) by mouth 2 times daily       CALCIUM + D PO      Take  by mouth.       VITAMIN D PO      Take  by mouth.

## 2017-06-21 ENCOUNTER — TELEPHONE (OUTPATIENT)
Dept: FAMILY MEDICINE | Facility: CLINIC | Age: 77
End: 2017-06-21

## 2017-06-21 ENCOUNTER — TRANSFERRED RECORDS (OUTPATIENT)
Dept: HEALTH INFORMATION MANAGEMENT | Facility: CLINIC | Age: 77
End: 2017-06-21

## 2017-06-21 DIAGNOSIS — M71.9 BURSITIS: ICD-10-CM

## 2017-06-21 DIAGNOSIS — M65.979 TENOSYNOVITIS OF FOOT: Primary | ICD-10-CM

## 2017-06-22 NOTE — TELEPHONE ENCOUNTER
Called to inform patient of results of MRI of left foot showing tenosynovitis and intermetatarsal bursitis that is likely cause of pain. Offered to patient to have see see podiatry for injections.  At this time, she would like to continue conservative therapy with NSAIDs and ice, but may consider podiatry in the future

## 2018-04-19 ENCOUNTER — OFFICE VISIT (OUTPATIENT)
Dept: FAMILY MEDICINE | Facility: CLINIC | Age: 78
End: 2018-04-19
Payer: COMMERCIAL

## 2018-04-19 VITALS
OXYGEN SATURATION: 99 % | WEIGHT: 99.2 LBS | HEART RATE: 63 BPM | TEMPERATURE: 97 F | DIASTOLIC BLOOD PRESSURE: 60 MMHG | RESPIRATION RATE: 16 BRPM | BODY MASS INDEX: 19.48 KG/M2 | HEIGHT: 60 IN | SYSTOLIC BLOOD PRESSURE: 158 MMHG

## 2018-04-19 DIAGNOSIS — L03.90 CELLULITIS, UNSPECIFIED CELLULITIS SITE: Primary | ICD-10-CM

## 2018-04-19 PROCEDURE — 99213 OFFICE O/P EST LOW 20 MIN: CPT | Performed by: PHYSICIAN ASSISTANT

## 2018-04-19 RX ORDER — CEPHALEXIN 500 MG/1
500 CAPSULE ORAL 4 TIMES DAILY
Qty: 40 CAPSULE | Refills: 0 | Status: SHIPPED | OUTPATIENT
Start: 2018-04-19 | End: 2018-04-29

## 2018-04-19 NOTE — MR AVS SNAPSHOT
"              After Visit Summary   2018    Karen Sanchez    MRN: [id number]           Patient Information     Date Of Birth          1940        Visit Information        Provider Department      2018 9:40 AM Saurabh Garza PA-C [facility]        Today's Diagnoses     Cellulitis, unspecified cellulitis site    -  1       Follow-ups after your visit        Follow-up notes from your care team     Return for 7-10 days see dermatology if no improvement.      Who to contact     If you have questions or need follow up information about today's clinic visit or your schedule please contact [facility] directly at [phone].  Normal or non-critical lab and imaging results will be communicated to you by MyChart, letter or phone within 4 business days after the clinic has received the results. If you do not hear from us within 7 days, please contact the clinic through MyChart or phone. If you have a critical or abnormal lab result, we will notify you by phone as soon as possible.  Submit refill requests through [employer] or call your pharmacy and they will forward the refill request to us. Please allow 3 business days for your refill to be completed.          Additional Information About Your Visit        MyChart Information     [employer] lets you send messages to your doctor, view your test results, renew your prescriptions, schedule appointments and more. To sign up, go to [email or web address] . Click on \"Log in\" on the left side of the screen, which will take you to the Welcome page. Then click on \"Sign up Now\" on the right side of the page.     You will be asked to enter the access code listed below, as well as some personal information. Please follow the directions to create your username and password.     Your access code is: [email or web address]  Expires: 2018 10:21 AM     Your access code will  in 90 days. If you need help or a new code, please call " your [facility] clinic or [phone].        Care EveryWhere ID     This is your Care EveryWhere ID. This could be used by other organizations to access your [facility] medical records  [id number]        Your Vitals Were     Pulse Temperature Respirations Height Pulse Oximetry BMI (Body Mass Index)    63 97  F (36.1  C) (Tympanic) 16 5' (1.524 m) 99% 19.37 kg/m2       Blood Pressure from Last 3 Encounters:   04/19/18 158/60   06/20/17 130/60   06/09/17 151/68    Weight from Last 3 Encounters:   04/19/18 99 lb 3.2 oz (45 kg)   06/20/17 109 lb 9.6 oz (49.7 kg)   06/09/17 110 lb (49.9 kg)              Today, you had the following     No orders found for display         Today's Medication Changes          These changes are accurate as of 4/19/18 10:21 AM.  If you have any questions, ask your nurse or doctor.               Start taking these medicines.        Dose/Directions    cephALEXin 500 MG capsule   Commonly known as:  KEFLEX   Used for:  Cellulitis, unspecified cellulitis site   Started by:  Saurabh Garza PA-C        Dose:  500 mg   Take 1 capsule (500 mg) by mouth 4 times daily for 10 days   Quantity:  40 capsule   Refills:  0            Where to get your medicines      These medications were sent to [facility] [address] [place] [address] AT [facility] OF  & [facility] 7  [address] [place] [id number]     Phone:  [phone]     cephALEXin 500 MG capsule                Primary Care Provider    [facility] MD [place]       No address on file        Equal Access to Services     JOSE LUIS CHRISTIE AH: Kimberly Lei, wamikalada luqadaha, qaybta adrianealnay gilliam. So [facility] [phone].    ATENCIÓN: Si habla español, tiene a heaton disposición servicios gratuitos de asistencia lingüística. Llame al [phone].    We comply with applicable federal civil rights laws and [place] laws. We do not discriminate on the basis of  race, color, national origin, age, disability, sex, sexual orientation, or gender identity.            Thank you!     Thank you for choosing [facility]  for your care. Our goal is always to provide you with excellent care. Hearing back from our patients is one way we can continue to improve our services. Please take a few minutes to complete the written survey that you may receive in the mail after your visit with us. Thank you!             Your Updated Medication List - Protect others around you: Learn how to safely use, store and throw away your medicines at www.disposemymeds.org.          This list is accurate as of 4/19/18 10:21 AM.  Always use your most recent med list.                   Brand Name Dispense Instructions for use Diagnosis    atenolol 50 MG tablet    TENORMIN    180 tablet    Take 1 tablet (50 mg) by mouth 2 times daily    Essential hypertension with goal blood pressure less than 140/90       CALCIUM + D PO      Take  by mouth.        cephALEXin 500 MG capsule    KEFLEX    40 capsule    Take 1 capsule (500 mg) by mouth 4 times daily for 10 days    Cellulitis, unspecified cellulitis site       VITAMIN D PO      Take  by mouth.

## 2018-04-19 NOTE — PROGRESS NOTES
SUBJECTIVE:   Karen Sanchez is a 77 year old female who presents to clinic today for the following health issues:    Derm Problem  Onset: x 6 weeks approx.    Description:   Location: rt lower leg  Character: round, raised, painful, red  Itching (Pruritis): no     Progression of Symptoms:  Worsening, getting bigger and more tender    Accompanying Signs & Symptoms:  Fever: no   Body aches or joint pain: no   Sore throat symptoms: no   Recent cold symptoms: no     History:   Previous similar rash: no     Precipitating factors:   Exposure to similar rash: no   New exposures: None   Recent travel: no     Alleviating factors:  Nothing    Therapies Tried and outcome: Bacitracin ointment and alcohol - not helping    Karen is a 77 year old female who presents to clinic today with a rash.    Karen has been experiencing an erythematous round hard feeling rash to right medial leg x 6 weeks. She says that it has not grown too much in size, but that it is becoming more tender and warm. She has had some mild, localized swelling but it has resolved. She denies itching of the site or trauma to the area. She has had no constitutional symptoms including fever, chills, nausea, or vomiting. Treatments tried include bacitracin and rubbing alcohol with no improvement of symptoms.     Problem list and histories reviewed & adjusted, as indicated.  Additional history: as documented    Patient Active Problem List   Diagnosis     CARDIOVASCULAR SCREENING; LDL GOAL LESS THAN 130     Foot fracture, right     Fracture, humerus, right     Hypertension goal BP (blood pressure) < 140/90     Arm fracture, right     Contracture of right elbow     Osteoporosis     SVT (supraventricular tachycardia) (H)     PVC (premature ventricular contraction)     Hypertension     Aortic valve disorder     Past Surgical History:   Procedure Laterality Date     OPEN REDUCTION INTERNAL FIXATION HUMERUS PROXIMAL  12/27/2011    Procedure:OPEN REDUCTION INTERNAL  FIXATION HUMERUS PROXIMAL; OPEN REDUCTION INTERNAL FIXATION RIGHT PROXIMAL HUMERUS ; Surgeon:OCTAVIO WRIGHT; Location:[facility] OR     TONSILLECTOMY  as child       Social History   Substance Use Topics     Smoking status: Never Smoker     Smokeless tobacco: Never Used     Alcohol use No     Family History   Problem Relation Age of Onset     CEREBROVASCULAR DISEASE Mother      age 83     CEREBROVASCULAR DISEASE Father      age  82         Current Outpatient Prescriptions   Medication Sig Dispense Refill     atenolol (TENORMIN) 50 MG tablet Take 1 tablet (50 mg) by mouth 2 times daily 180 tablet 3     Calcium Carbonate-Vitamin D (CALCIUM + D PO) Take  by mouth.       cephALEXin (KEFLEX) 500 MG capsule Take 1 capsule (500 mg) by mouth 4 times daily for 10 days 40 capsule 0     Cholecalciferol (VITAMIN D PO) Take  by mouth.         Reviewed and updated as needed this visit by clinical staff       Reviewed and updated as needed this visit by Provider         ROS:  Constitutional, HEENT, cardiovascular, pulmonary, gi and gu systems are negative, except as otherwise noted.    OBJECTIVE:     /60  Pulse 63  Temp 97  F (36.1  C) (Tympanic)  Resp 16  Ht 5' (1.524 m)  Wt 99 lb 3.2 oz (45 kg)  SpO2 99%  BMI 19.37 kg/m2  Body mass index is 19.37 kg/(m^2).  GENERAL: healthy, alert and no distress  RESP: no increased work of breathing including use of accessory muscles  MS: Right Leg: no gross musculoskeletal defects noted, no edema/swelling, capillary refill in toes <3 sec, normal sensation  SKIN: 5.5 cm round well circumscribed erythematous plaque with warmth, and induration induration and some overlying dryness. No bleeding, open wound, excoriations, or exudate. Tender to palpation with no signs of cellulitis leading away from lesion.  PSYCH: mentation appears normal, affect normal/bright    Diagnostic Test Results:  none     ASSESSMENT/PLAN:     1. Cellulitis, unspecified cellulitis site  Physical exam and history  are consistent for a localized cellulitis. Patient was educated on the side effects of antibiotic treatment including GI upset. She was instructed to take the medication with food.  - cephALEXin (KEFLEX) 500 MG capsule; Take 1 capsule (500 mg) by mouth 4 times daily for 10 days  Dispense: 40 capsule; Refill: 0  - Follow up in clinic in one week if no signs of improvement or worsening symptoms. We will make a referral to dermatology at that time for further assessment.    See Patient Instructions    Saurabh Garza PA-C  [facility]

## 2018-04-19 NOTE — NURSING NOTE
Chief Complaint   Patient presents with     Derm Problem     red spot on lower right leg for about 6 weeks       Initial /60  Pulse 63  Temp 97  F (36.1  C) (Tympanic)  Resp 16  Ht 5' (1.524 m)  Wt 99 lb 3.2 oz (45 kg)  SpO2 99%  BMI 19.37 kg/m2 Estimated body mass index is 19.37 kg/(m^2) as calculated from the following:    Height as of this encounter: 5' (1.524 m).    Weight as of this encounter: 99 lb 3.2 oz (45 kg).  Medication Reconciliation: complete    Lorraine Young MA

## 2018-04-26 ENCOUNTER — TELEPHONE (OUTPATIENT)
Dept: FAMILY MEDICINE | Facility: CLINIC | Age: 78
End: 2018-04-26

## 2018-04-26 NOTE — TELEPHONE ENCOUNTER
Spoke with patient and informed of below. She states she does have a dermatologist and will call first thing in the morning to see if they can get her in. Advised it appears that skin clinic does have openings for tomorrow at this time and to call back and inform. Patient/ parent verbalized understanding and agrees with plan.    Sabine Cassidy RN   [facility] - Triage

## 2018-04-26 NOTE — TELEPHONE ENCOUNTER
Spoke with patient, she states that she has been taking keflex as prescribed and the area of cellulitis has not improved. She states it has not gotten bigger but is more painful and feels warmer to the touch. Wondering if she needs a different antibiotic? Advised her to outline the area with a pen to keep an eye on things. She is agreeable. Pharmacy pended, please advise.     4/19/18 OV note:    1. Cellulitis, unspecified cellulitis site  Physical exam and history are consistent for a localized cellulitis. Patient was educated on the side effects of antibiotic treatment including GI upset. She was instructed to take the medication with food.  - cephALEXin (KEFLEX) 500 MG capsule; Take 1 capsule (500 mg) by mouth 4 times daily for 10 days  Dispense: 40 capsule; Refill: 0  - Follow up in clinic in one week if no signs of improvement or worsening symptoms. We will make a referral to dermatology at that time for further assessment.    Sabine Cassidy RN   [facility] - Triage

## 2018-04-26 NOTE — TELEPHONE ENCOUNTER
Reason for Call:  Other returning call    Detailed comments:  Karen was told at last visit to call back and report this week:  spot on leg is same and pain increased    Phone Number Patient can be reached at: Home number on file [phone] (home)    Best Time: any    Can we leave a detailed message on this number? YES    Call taken on 4/26/2018 at 2:45 PM by Adriana Cha

## 2018-04-26 NOTE — TELEPHONE ENCOUNTER
Saurabh wants her to be followed, if not improving, to consider referral to skin care. Please check on if she can see skin care tomorrow.   Otherwise, we can reassess and decide if she needs further treatment with different abx

## 2018-04-26 NOTE — TELEPHONE ENCOUNTER
Left non detailed message for patient to return call.  Sabine Cassidy RN   [facility] - Triage

## 2018-08-27 ENCOUNTER — OFFICE VISIT (OUTPATIENT)
Dept: CARDIOLOGY | Facility: CLINIC | Age: 78
End: 2018-08-27
Attending: INTERNAL MEDICINE
Payer: COMMERCIAL

## 2018-08-27 VITALS
HEIGHT: 60 IN | WEIGHT: 95.8 LBS | SYSTOLIC BLOOD PRESSURE: 168 MMHG | HEART RATE: 72 BPM | BODY MASS INDEX: 18.81 KG/M2 | DIASTOLIC BLOOD PRESSURE: 62 MMHG

## 2018-08-27 DIAGNOSIS — I49.49 PREMATURE BEATS: ICD-10-CM

## 2018-08-27 DIAGNOSIS — I10 ESSENTIAL HYPERTENSION WITH GOAL BLOOD PRESSURE LESS THAN 140/90: ICD-10-CM

## 2018-08-27 PROCEDURE — 93000 ELECTROCARDIOGRAM COMPLETE: CPT | Performed by: INTERNAL MEDICINE

## 2018-08-27 PROCEDURE — 99214 OFFICE O/P EST MOD 30 MIN: CPT | Mod: 25 | Performed by: INTERNAL MEDICINE

## 2018-08-27 RX ORDER — ATENOLOL 50 MG/1
50 TABLET ORAL DAILY
Qty: 90 TABLET | Refills: 3 | Status: SHIPPED | OUTPATIENT
Start: 2018-08-27 | End: 2019-10-25

## 2018-08-27 NOTE — MR AVS SNAPSHOT
After Visit Summary   8/27/2018    Karen Sanchez    MRN: [id number]           Patient Information     Date Of Birth          1940        Visit Information        Provider Department      8/27/2018 2:15 PM Yelitza Jackson MD [facility]   [place]        Today's Diagnoses     Premature beats        Essential hypertension with goal blood pressure less than 140/90           Follow-ups after your visit        Additional Services     Follow-Up with Cardiac Advanced Practice Provider           Follow-Up with Electrophysiologist                 Your next 10 appointments already scheduled     Sep 04, 2018 11:45 AM CDT   US LOWER EXTREMITY ARTERIAL DUPLEX BILATERAL with SHVUS4   [facility] ([facility])    [address]  [place] [address]   [phone]           Please bring a list of your medicines (including vitamins, minerals and over-the-counter drugs). Also, tell your doctor about any allergies you may have. Wear comfortable clothes and leave your valuables at home.  You do not need to do anything special to prepare for your exam.  Please call the Imaging Department at your exam site with any questions.              Future tests that were ordered for you today     Open Future Orders        Priority Expected Expires Ordered    Follow-Up with Electrophysiologist Routine 8/27/2020 1/9/2021 8/27/2018    Follow-Up with Cardiac Advanced Practice Provider Routine 8/27/2019 1/9/2020 8/27/2018    US Lower Extremity Arterial Duplex Bilateral Routine 9/3/2018 8/27/2019 8/27/2018            Who to contact     If you have questions or need follow up information about today's clinic visit or your schedule please contact [facility]   [place] directly at [phone].  Normal or non-critical lab and imaging results will be communicated to you by MyChart, letter or phone within 4 business  days after the clinic has received the results. If you do not hear from us within 7 days, please contact the clinic through [employer] or phone. If you have a critical or abnormal lab result, we will notify you by phone as soon as possible.  Submit refill requests through [employer] or call your pharmacy and they will forward the refill request to us. Please allow 3 business days for your refill to be completed.          Additional Information About Your Visit        Care EveryWhere ID     This is your Care EveryWhere ID. This could be used by other organizations to access your [facility] medical records  [id number]        Your Vitals Were     Pulse Height BMI (Body Mass Index)             72 1.524 m (5') 18.71 kg/m2          Blood Pressure from Last 3 Encounters:   08/27/18 168/62   04/19/18 158/60   06/20/17 130/60    Weight from Last 3 Encounters:   08/27/18 43.5 kg (95 lb 12.8 oz)   04/19/18 45 kg (99 lb 3.2 oz)   06/20/17 49.7 kg (109 lb 9.6 oz)              We Performed the Following     EKG 12-lead complete w/read - Clinics     Follow-Up with Electrophysiologist          Where to get your medicines      These medications were sent to [facility] [address] [place] [address] AT [facility] OF  & [address]  [address] [place] [id number]     Phone:  [phone]     atenolol 50 MG tablet          Primary Care Provider    [facility] MD [place]       No address on file        Equal Access to Services     BRENDA CHRISTIE AH: Hadii aad ku hadasho Soomaali, waaxda luqadaha, qaybta kaalmada adeegyada, nay katz. So [facility] [phone].    ATENCIÓN: Si habla español, tiene a heaton disposición servicios gratuitos de asistencia lingüística. Llame al [phone].    We comply with applicable federal civil rights laws and [place] laws. We do not discriminate on the basis of race, color, national origin, age, disability, sex, sexual orientation, or gender  identity.            Thank you!     Thank you for choosing [facility]  for your care. Our goal is always to provide you with excellent care. Hearing back from our patients is one way we can continue to improve our services. Please take a few minutes to complete the written survey that you may receive in the mail after your visit with us. Thank you!             Your Updated Medication List - Protect others around you: Learn how to safely use, store and throw away your medicines at www.disposemymeds.org.          This list is accurate as of 8/27/18  2:43 PM.  Always use your most recent med list.                   Brand Name Dispense Instructions for use Diagnosis    atenolol 50 MG tablet    TENORMIN    90 tablet    Take 1 tablet (50 mg) by mouth daily    Essential hypertension with goal blood pressure less than 140/90       CALCIUM + D PO      Take  by mouth.        VITAMIN D PO      Take  by mouth.

## 2018-08-27 NOTE — PROGRESS NOTES
Service Date: 08/27/2018      HISTORY OF PRESENT ILLNESS:  I saw Ms. Sanchez for followup of symptomatic PACs and PVCs.  She has been on atenolol 50 mg once a day over the last year.  She has tried atenolol 50 mg p.o. b.i.d., but could not tolerate a dose because of severe fatigue.  For the last year, she has not been bothered by palpitation or dizziness.  She has no chest pain or shortness of breath.      The patient noticed a small red spot in the right lower leg in the spring.  This has been growing gradually.  She has been evaluated by Dermatology and was previously treated with antibiotics without apparent defect.  She has some tingling sensation over the site, but otherwise has no pain.  She walks without difficulty.  There was a suspicion of possible vascular disease per Dermatology.      PHYSICAL EXAMINATION:   VITAL SIGNS:  Blood pressure was 168/62, heart rate 72 beats per minute, body weight 95 pounds.   HEENT:  Eyes and ENT were unremarkable.   LUNGS:  Clear.   CARDIAC:  Rhythm was regular and heart sounds were normal without murmur.   ABDOMEN:  Examination showed no hepatomegaly.   EXTREMITIES:  There was no pedal edema.  Her right lower leg showed rate spot with clear border.  The pedal pulses were strong.  There was no active draining and the local temperature was normal.      ASSESSMENT AND RECOMMENDATIONS:  Ms. Sanchez is a 78-year-old white female who has a history of symptomatic PACs and PVCs.  She is doing well on atenolol from the arrhythmia point of view.      Her skin problem in the right lower leg is unlikely to be vascular disease.  However, I think it is important to have a vascular ultrasound to exclude any disease of the vascular system.  If the vascular ultrasound does not show apparent occlusive arterial disease, she may benefit from a second opinion from a different dermatologist.      Her blood pressure is relatively high today.  She may need adjustment of medication, perhaps  addition of a different high blood pressure medicine in your clinic in the near future.      cc:      [address] [address]         DARIUSZ BECKER MD             D: 2018   T: 2018   MT: HOWIE      Name:     PROMISE COLLIER   MRN:      [id number]        Account:      [id number]   :      1940           Service Date: 2018      Document: [id number]

## 2018-08-27 NOTE — PROGRESS NOTES
HPI and Plan:   See dictation    Orders Placed This Encounter   Procedures     US Lower Extremity Arterial Duplex Bilateral     Follow-Up with Electrophysiologist     Follow-Up with Cardiac Advanced Practice Provider     EKG 12-lead complete w/read - Clinics       No orders of the defined types were placed in this encounter.      There are no discontinued medications.      Encounter Diagnoses   Name Primary?     Premature beats      Essential hypertension with goal blood pressure less than 140/90        CURRENT MEDICATIONS:  Current Outpatient Prescriptions   Medication Sig Dispense Refill     atenolol (TENORMIN) 50 MG tablet Take 1 tablet (50 mg) by mouth 2 times daily (Patient taking differently: Take 50 mg by mouth daily ) 180 tablet 3     Calcium Carbonate-Vitamin D (CALCIUM + D PO) Take  by mouth.       Cholecalciferol (VITAMIN D PO) Take  by mouth.         ALLERGIES     Allergies   Allergen Reactions     Sulfa Drugs Rash     Hctz      Cough, dehydration, CROSS-REACTION with sulfa allergy       Darvon [Propoxyphene Hcl] Other (See Comments)     Stiff neck         PAST MEDICAL HISTORY:  Past Medical History:   Diagnosis Date     Arm fracture, right 12/11     Essential hypertension, benign      Hypertension      PAC (premature atrial contraction)      Pneumonia 1988     PVC (premature ventricular contraction)      Recurrent Otitis media        PAST SURGICAL HISTORY:  Past Surgical History:   Procedure Laterality Date     OPEN REDUCTION INTERNAL FIXATION HUMERUS PROXIMAL  12/27/2011    Procedure:OPEN REDUCTION INTERNAL FIXATION HUMERUS PROXIMAL; OPEN REDUCTION INTERNAL FIXATION RIGHT PROXIMAL HUMERUS ; Surgeon:OCTAVIO WRIGHT; Location:[facility] OR     TONSILLECTOMY  as child       FAMILY HISTORY:  Family History   Problem Relation Age of Onset     Cerebrovascular Disease Mother      age 83     Cerebrovascular Disease Father      age  82       SOCIAL HISTORY:  Social History     Social History     Marital status:       Spouse name: N/A     Number of children: N/A     Years of education: N/A     Occupational History     retired      Social History Main Topics     Smoking status: Never Smoker     Smokeless tobacco: Never Used     Alcohol use No     Drug use: No     Sexual activity: Not Currently     Partners: Male     Other Topics Concern     None     Social History Narrative       Review of Systems:  Skin:  Negative       Eyes:  Positive for glasses    ENT:  Negative      Respiratory:  Negative       Cardiovascular:    Positive for;palpitations    Gastroenterology: Negative      Genitourinary:  Negative      Musculoskeletal:  Negative      Neurologic:  Negative      Psychiatric:  Positive for excessive stress  dx'd with kidney disfunction  Heme/Lymph/Imm:  Negative      Endocrine:  Negative        Physical Exam:  Vitals: /62  Pulse 72  Ht 1.524 m (5')  Wt 43.5 kg (95 lb 12.8 oz)  BMI 18.71 kg/m2    Constitutional:  cooperative, alert and oriented, well developed, well nourished, in no acute distress        Skin:  warm and dry to the touch     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)    Head:  normocephalic, no masses or lesions        Eyes:  pupils equal and round;conjunctivae and lids unremarkable;sclera white        Lymph:      ENT:  no pallor or cyanosis, dentition good        Neck:  JVP normal;no carotid bruit        Respiratory:  normal breath sounds, clear to auscultation, normal A-P diameter, normal symmetry, normal respiratory excursion, no use of accessory muscles         Cardiac: regular rhythm, normal S1/S2, no S3 or S4, apical impulse not displaced, no murmurs, gallops or rubs                pulses full and equal                                        GI:  abdomen soft        Extremities and Muscular Skeletal:  no deformities, clubbing, cyanosis, erythema observed;no edema              Neurological:           Psych:           CC  Yelitza Jackson MD  [address]  [address]  [place]  [address]

## 2018-08-27 NOTE — LETTER
8/27/2018    [facility] [place]  No address on file    RE: Karen Sanchez       Dear Colleague,    I had the pleasure of seeing Karen Sanchez in the [facility].    HPI and Plan:   See dictation    Orders Placed This Encounter   Procedures     US Lower Extremity Arterial Duplex Bilateral     Follow-Up with Electrophysiologist     Follow-Up with Cardiac Advanced Practice Provider     EKG 12-lead complete w/read - Clinics       No orders of the defined types were placed in this encounter.      There are no discontinued medications.      Encounter Diagnoses   Name Primary?     Premature beats      Essential hypertension with goal blood pressure less than 140/90        CURRENT MEDICATIONS:  Current Outpatient Prescriptions   Medication Sig Dispense Refill     atenolol (TENORMIN) 50 MG tablet Take 1 tablet (50 mg) by mouth 2 times daily (Patient taking differently: Take 50 mg by mouth daily ) 180 tablet 3     Calcium Carbonate-Vitamin D (CALCIUM + D PO) Take  by mouth.       Cholecalciferol (VITAMIN D PO) Take  by mouth.         ALLERGIES     Allergies   Allergen Reactions     Sulfa Drugs Rash     Hctz      Cough, dehydration, CROSS-REACTION with sulfa allergy       Darvon [Propoxyphene Hcl] Other (See Comments)     Stiff neck         PAST MEDICAL HISTORY:  Past Medical History:   Diagnosis Date     Arm fracture, right 12/11     Essential hypertension, benign      Hypertension      PAC (premature atrial contraction)      Pneumonia 1988     PVC (premature ventricular contraction)      Recurrent Otitis media        PAST SURGICAL HISTORY:  Past Surgical History:   Procedure Laterality Date     OPEN REDUCTION INTERNAL FIXATION HUMERUS PROXIMAL  12/27/2011    Procedure:OPEN REDUCTION INTERNAL FIXATION HUMERUS PROXIMAL; OPEN REDUCTION INTERNAL FIXATION RIGHT PROXIMAL HUMERUS ; Surgeon:OCTAVIO WRIGHT; Location:[facility] OR     TONSILLECTOMY  as child       FAMILY HISTORY:  Family History    Problem Relation Age of Onset     Cerebrovascular Disease Mother      age 83     Cerebrovascular Disease Father      age  82       SOCIAL HISTORY:  Social History     Social History     Marital status:      Spouse name: N/A     Number of children: N/A     Years of education: N/A     Occupational History     retired      Social History Main Topics     Smoking status: Never Smoker     Smokeless tobacco: Never Used     Alcohol use No     Drug use: No     Sexual activity: Not Currently     Partners: Male     Other Topics Concern     None     Social History Narrative       Review of Systems:  Skin:  Negative       Eyes:  Positive for glasses    ENT:  Negative      Respiratory:  Negative       Cardiovascular:    Positive for;palpitations    Gastroenterology: Negative      Genitourinary:  Negative      Musculoskeletal:  Negative      Neurologic:  Negative      Psychiatric:  Positive for excessive stress  dx'd with kidney disfunction  Heme/Lymph/Imm:  Negative      Endocrine:  Negative        Physical Exam:  Vitals: /62  Pulse 72  Ht 1.524 m (5')  Wt 43.5 kg (95 lb 12.8 oz)  BMI 18.71 kg/m2    Constitutional:  cooperative, alert and oriented, well developed, well nourished, in no acute distress        Skin:  warm and dry to the touch     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)    Head:  normocephalic, no masses or lesions        Eyes:  pupils equal and round;conjunctivae and lids unremarkable;sclera white        Lymph:      ENT:  no pallor or cyanosis, dentition good        Neck:  JVP normal;no carotid bruit        Respiratory:  normal breath sounds, clear to auscultation, normal A-P diameter, normal symmetry, normal respiratory excursion, no use of accessory muscles         Cardiac: regular rhythm, normal S1/S2, no S3 or S4, apical impulse not displaced, no murmurs, gallops or rubs                pulses full and equal                                        GI:  abdomen soft        Extremities  and Muscular Skeletal:  no deformities, clubbing, cyanosis, erythema observed;no edema              Neurological:           Psych:           CC  Yelitza Jackson MD  [address]  [place] [address]                Service Date: 08/27/2018      HISTORY OF PRESENT ILLNESS:  I saw Ms. Sanchez for followup of symptomatic PACs and PVCs.  She has been on atenolol 50 mg once a day over the last year.  She has tried atenolol 50 mg p.o. b.i.d., but could not tolerate a dose because of severe fatigue.  For the last year, she has not been bothered by palpitation or dizziness.  She has no chest pain or shortness of breath.      The patient noticed a small red spot in the right lower leg in the spring.  This has been growing gradually.  She has been evaluated by Dermatology and was previously treated with antibiotics without apparent defect.  She has some tingling sensation over the site, but otherwise has no pain.  She walks without difficulty.  There was a suspicion of possible vascular disease per Dermatology.      PHYSICAL EXAMINATION:   VITAL SIGNS:  Blood pressure was 168/62, heart rate 72 beats per minute, body weight 95 pounds.   HEENT:  Eyes and ENT were unremarkable.   LUNGS:  Clear.   CARDIAC:  Rhythm was regular and heart sounds were normal without murmur.   ABDOMEN:  Examination showed no hepatomegaly.   EXTREMITIES:  There was no pedal edema.  Her right lower leg showed rate spot with clear border.  The pedal pulses were strong.  There was no active draining and the local temperature was normal.      ASSESSMENT AND RECOMMENDATIONS:  Ms. Sanchez is a 78-year-old white female who has a history of symptomatic PACs and PVCs.  She is doing well on atenolol from the arrhythmia point of view.      Her skin problem in the right lower leg is unlikely to be vascular disease.  However, I think it is important to have a vascular ultrasound to exclude any disease of the vascular system.  If the vascular ultrasound does  not show apparent occlusive arterial disease, she may benefit from a second opinion from a different dermatologist.      Her blood pressure is relatively high today.  She may need adjustment of medication, perhaps addition of a different high blood pressure medicine in your clinic in the near future.      cc:      [facility]   [address] [place] [address]         YELITZA JACKSON MD             D: 2018   T: 2018   MT:       Name:     PROMISE COLLIER   MRN:      [id number]        Account:      [id number]   :      1940           Service Date: 2018      Document: [id number]        Thank you for allowing me to participate in the care of your patient.      Sincerely,     Yelitza Jackson MD     [facility] [place] [facility]    cc:   Yelitza Jackson MD  [address]  [address] [place] [id number]

## 2018-08-27 NOTE — LETTER
8/27/2018       Clinic [facility] [place]        RE: Karen Sanchez       Dear Colleague,    I had the pleasure of seeing Karen Sanchez in the [facility].    Service Date: 08/27/2018      HISTORY OF PRESENT ILLNESS:  I saw Ms. Sanchez for followup of symptomatic PACs and PVCs.  She has been on atenolol 50 mg once a day over the last year.  She has tried atenolol 50 mg p.o. b.i.d., but could not tolerate a dose because of severe fatigue.  For the last year, she has not been bothered by palpitation or dizziness.  She has no chest pain or shortness of breath.      The patient noticed a small red spot in the right lower leg in the spring.  This has been growing gradually.  She has been evaluated by Dermatology and was previously treated with antibiotics without apparent defect.  She has some tingling sensation over the site, but otherwise has no pain.  She walks without difficulty.  There was a suspicion of possible vascular disease per Dermatology.      PHYSICAL EXAMINATION:   VITAL SIGNS:  Blood pressure was 168/62, heart rate 72 beats per minute, body weight 95 pounds.   HEENT:  Eyes and ENT were unremarkable.   LUNGS:  Clear.   CARDIAC:  Rhythm was regular and heart sounds were normal without murmur.   ABDOMEN:  Examination showed no hepatomegaly.   EXTREMITIES:  There was no pedal edema.  Her right lower leg showed rate spot with clear border.  The pedal pulses were strong.  There was no active draining and the local temperature was normal.      ASSESSMENT AND RECOMMENDATIONS:  Ms. Sanchez is a 78-year-old white female who has a history of symptomatic PACs and PVCs.  She is doing well on atenolol from the arrhythmia point of view.      Her skin problem in the right lower leg is unlikely to be vascular disease.  However, I think it is important to have a vascular ultrasound to exclude any disease of the vascular system.  If the vascular ultrasound does not show apparent  occlusive arterial disease, she may benefit from a second opinion from a different dermatologist.      Her blood pressure is relatively high today.  She may need adjustment of medication, perhaps addition of a different high blood pressure medicine in your clinic in the near future.      cc:      [address] [address]         YELITZA JACKSON MD             D: 2018   T: 2018   MT:       Name:     PROMISE COLLIER   MRN:      [id number]        Account:      [id number]   :      1940           Service Date: 2018      Document: [id number]           Outpatient Encounter Prescriptions as of 2018   Medication Sig Dispense Refill     atenolol (TENORMIN) 50 MG tablet Take 1 tablet (50 mg) by mouth daily 90 tablet 3     Calcium Carbonate-Vitamin D (CALCIUM + D PO) Take  by mouth.       Cholecalciferol (VITAMIN D PO) Take  by mouth.       [DISCONTINUED] atenolol (TENORMIN) 50 MG tablet Take 1 tablet (50 mg) by mouth 2 times daily (Patient taking differently: Take 50 mg by mouth daily ) 180 tablet 3     No facility-administered encounter medications on file as of 2018.        Again, thank you for allowing me to participate in the care of your patient.      Sincerely,    Yelitza Jackson MD     [facility]

## 2018-09-04 ENCOUNTER — HOSPITAL ENCOUNTER (OUTPATIENT)
Dept: ULTRASOUND IMAGING | Facility: CLINIC | Age: 78
Discharge: HOME | End: 2018-09-04
Attending: INTERNAL MEDICINE | Admitting: INTERNAL MEDICINE
Payer: COMMERCIAL

## 2018-09-04 DIAGNOSIS — I49.49 PREMATURE BEATS: ICD-10-CM

## 2018-09-04 DIAGNOSIS — I10 ESSENTIAL HYPERTENSION WITH GOAL BLOOD PRESSURE LESS THAN 140/90: ICD-10-CM

## 2018-09-04 PROCEDURE — 93925 LOWER EXTREMITY STUDY: CPT

## 2018-09-04 PROCEDURE — 93925 LOWER EXTREMITY STUDY: CPT | Mod: 26 | Performed by: INTERNAL MEDICINE

## 2018-09-11 ENCOUNTER — TELEPHONE (OUTPATIENT)
Dept: CARDIOLOGY | Facility: CLINIC | Age: 78
End: 2018-09-11

## 2018-09-11 NOTE — TELEPHONE ENCOUNTER
"Left message notifying patient of normal results from the US Lower Extremity Arterial Duplex Bilateral testing. \"good blood flow going to both legs, not the cause of the leg ulcer/sore). Instructed her to call us back wit any questions. YASMEEN Rushing  "

## 2019-01-30 ENCOUNTER — TELEPHONE (OUTPATIENT)
Dept: FAMILY MEDICINE | Facility: CLINIC | Age: 79
End: 2019-01-30

## 2019-01-30 NOTE — TELEPHONE ENCOUNTER
Needs of attention regarding:  -High Blood Pressure    Health Maintenance Topics with due status: Overdue       Topic Date Due    DTAP/TDAP/TD IMMUNIZATION 08/23/1966    ZOSTER IMMUNIZATION 08/23/1991    ADVANCE DIRECTIVE PLANNING Q5 YRS 08/23/1996    DEXA Q2 YR 09/21/2014    LIPID SCREEN Q5 YR FEMALE (SYSTEM ASSIGNED) 01/09/2018    INFLUENZA VACCINE 09/01/2018       Communication:  See Letter

## 2019-01-30 NOTE — LETTER
January 30, 2019      Karen Sanchez  [address] [address]   [place] [address]        Dear Karen,    I care about your health and have reviewed your health plan. I have reviewed your medical conditions, medication list, and lab results and am making recommendations based on this review, to better manage your health.    You are in particular need of attention regarding:  -High Blood Pressure    I am recommending that you:  -schedule a NURSE-ONLY BLOOD PRESSURE APPOINTMENT within the next 1-4 weeks.    Please call us at [phone] (or use [employer]) to address the above recommendations.     Thank you for trusting [facility] and we appreciate the opportunity to serve you.  We look forward to supporting your healthcare needs in the future.    Healthy Regards,    Saurabh Garza, MPH, PA-C

## 2019-10-24 NOTE — PROGRESS NOTES
"HPI:   I had the pleasure of seeing Karen when she came for follow up of ectopy.  This 79 year old sees Dr. Jackson for her history of:    1. 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   2. Benign Essential HTN    Karen saw Dr. Jackson 8/2018 at which time she was doing well from a palpitation standpoint. She did not tolerate higher doses of BB due to severe fatigue. Due to Dermatology's concern that she had a leg lesion due to vascular dz, US ABIs were ordered and were negative. Her BP was a bit high and PCP follow-up was recommended.     Interval History:  Really feels good from a heart perspective. M[place] and has no yard work.  Still cleans and does all of the grocery shopping without discomfort.  She does feel like her chest gets \"tight\" every 3-4 months when she hasn't slept well. She thinks her  has the beginnings of dementia, which is increasing her stress level.    No c/o edema, orthopnea or PND. Does not feel like she's retaining fluid at all. Weight is up since last year, which she's glad about.      No palpitations. Doing well on the atenolol.    BPs at home are ranging from 130-170s. . No dizziness or lightheadedness.     Diagnostic Testing:  EKG today, which I overread, showed SR 66 bpm. RSR in V1  LE US 9/2018 showed <50% LE stenosis bilaterally   Stress Echocardiogam 12/2011 showed no stress-induced wall motion  Echo 8/2007 showed normal EF 55-60% and no significant valvular abnls    Assessment & Plan:    1. Ectopy    PACs and PVCs previously noted. Atenolol has been working well to control sxs    Normal EF as of 2011 (stress echo)    PLAN:    Continue atenolol, but go up on dose due to HTN    See Dr. Jackson 8/2020 as previously ordered    2. Benign Essential HTN    BP is too high, even when I rechecked it     PLAN:    Discussed options for increase in atenolol to 75 mg daily (watching for bradycardia) vs switching atenolol to carvedilol for both alpha and beta blockade or continue atenolol 50 " mg, which has worked well for ectopy and adding amlodipine for BP control    Opted for increasing atenolol at this point. Will take 75 mg daily (25 in AM and 50 in PM or 75 mg all at once) and keep track of BPs    Will call me week of 11/11 with update. Encouraged to call sooner if any significant bradycardia or dizziness      Continue low salt diet        Tiera Remy PA-C, MSPAS      Orders Placed This Encounter   Procedures     EKG 12-lead complete w/read - Clinics (performed today)     Orders Placed This Encounter   Medications     atenolol (TENORMIN) 50 MG tablet     Sig: Take 1.5 tablets (75 mg) by mouth daily     Dispense:  135 tablet     Refill:  3     Medications Discontinued During This Encounter   Medication Reason     atenolol (TENORMIN) 50 MG tablet          Encounter Diagnoses   Name Primary?     Premature beats      Essential hypertension with goal blood pressure less than 140/90 Yes       CURRENT MEDICATIONS:  Current Outpatient Medications   Medication Sig Dispense Refill     atenolol (TENORMIN) 50 MG tablet Take 1.5 tablets (75 mg) by mouth daily 135 tablet 3     Calcium Carbonate-Vitamin D (CALCIUM + D PO) Take  by mouth.       Cholecalciferol (VITAMIN D PO) Take  by mouth.         ALLERGIES     Allergies   Allergen Reactions     Sulfa Drugs Rash     Hctz      Cough, dehydration, CROSS-REACTION with sulfa allergy       Darvon [Propoxyphene Hcl] Other (See Comments)     Stiff neck         PAST MEDICAL HISTORY:  Past Medical History:   Diagnosis Date     Arm fracture, right 12/11     Essential hypertension, benign      Hypertension      PAC (premature atrial contraction)      Pneumonia 1988     PVC (premature ventricular contraction)      Recurrent Otitis media        PAST SURGICAL HISTORY:  Past Surgical History:   Procedure Laterality Date     OPEN REDUCTION INTERNAL FIXATION HUMERUS PROXIMAL  12/27/2011    Procedure:OPEN REDUCTION INTERNAL FIXATION HUMERUS PROXIMAL; OPEN REDUCTION INTERNAL  FIXATION RIGHT PROXIMAL HUMERUS ; Surgeon:OCTAVIO WRIGHT; Location:[facility] OR     TONSILLECTOMY  as child       FAMILY HISTORY:  Family History   Problem Relation Age of Onset     Cerebrovascular Disease Mother         age 83     Cerebrovascular Disease Father         age  82       SOCIAL HISTORY:  Social History     Socioeconomic History     Marital status:      Spouse name: None     Number of children: None     Years of education: None     Highest education level: None   Occupational History     Occupation: retired   Social Needs     Financial resource strain: None     Food insecurity:     Worry: None     Inability: None     Transportation needs:     Medical: None     Non-medical: None   Tobacco Use     Smoking status: Never Smoker     Smokeless tobacco: Never Used   Substance and Sexual Activity     Alcohol use: No     Drug use: No     Sexual activity: Not Currently     Partners: Male   Lifestyle     Physical activity:     Days per week: None     Minutes per session: None     Stress: None   Relationships     Social connections:     Talks on phone: None     Gets together: None     Attends [employer] service: None     Active member of club or organization: None     Attends meetings of clubs or organizations: None     Relationship status: None     Intimate partner violence:     Fear of current or ex partner: None     Emotionally abused: None     Physically abused: None     Forced sexual activity: None   Other Topics Concern     Parent/sibling w/ CABG, MI or angioplasty before 65F 55M? Not Asked   Social History Narrative     None       Review of Systems:  Skin:  Negative     Eyes:  Positive for glasses  ENT:  Negative    Respiratory:  Negative for dyspnea on exertion;shortness of breath  Cardiovascular:  Negative for;chest pain;syncope or near-syncope;exercise intolerance;fatigue;lightheadedness;dizziness;palpitations    Gastroenterology: Negative for melena;hematochezia  Genitourinary:  Negative     Musculoskeletal:  Negative    Neurologic:  Negative    Psychiatric:  Positive for excessive stress  Heme/Lymph/Imm:  Negative    Endocrine:  Negative      Physical Exam:  Vitals: BP (!) 172/76   Pulse 67   Ht 1.524 m (5')   Wt 49 kg (108 lb 1.6 oz)   BMI 21.11 kg/m      Constitutional:  cooperative, alert and oriented, well developed, well nourished, in no acute distress        Skin:  warm and dry to the touch   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)    Head:  normocephalic, no masses or lesions        Eyes:  pupils equal and round;conjunctivae and lids unremarkable;sclera white        ENT:  no pallor or cyanosis, dentition good        Neck:  JVP normal;no carotid bruit        Chest:  normal breath sounds, clear to auscultation, normal A-P diameter, normal symmetry, normal respiratory excursion, no use of accessory muscles        Cardiac: regular rhythm, normal S1/S2, no S3 or S4, apical impulse not displaced, no murmurs, gallops or rubs                  Abdomen:  abdomen soft        Vascular: pulses full and equal                                      Extremities and Back:  no deformities, clubbing, cyanosis, erythema observed;no edema        Neurological:  no gross motor deficits          Recent Lab Results:  LIPID RESULTS:  Lab Results   Component Value Date    CHOL 231 (H) 01/09/2013    HDL 48 (L) 01/09/2013     (H) 01/09/2013    TRIG 126 01/09/2013    CHOLHDLRATIO 4.8 01/09/2013       LIVER ENZYME RESULTS:  Lab Results   Component Value Date    AST 32 01/09/2013    ALT 42 01/09/2013       CBC RESULTS:  Lab Results   Component Value Date    WBC 8.8 12/04/2011    RBC 4.70 12/04/2011    HGB 14.0 08/08/2012    HCT 41.0 12/04/2011    MCV 87 12/04/2011    MCH 29.8 12/04/2011    MCHC 34.1 12/04/2011    RDW 12.3 12/04/2011     12/04/2011       BMP RESULTS:  Lab Results   Component Value Date     01/09/2013    POTASSIUM 4.4 01/09/2013    CHLORIDE 108 01/09/2013    CO2 26 01/09/2013     ANIONGAP 9 01/09/2013     (H) 01/09/2013    BUN 17 01/09/2013    CR 0.80 01/09/2013    GFRESTIMATED 71 01/09/2013    GFRESTBLACK 86 01/09/2013    BATSHEVA 9.4 01/09/2013

## 2019-10-25 ENCOUNTER — OFFICE VISIT (OUTPATIENT)
Dept: CARDIOLOGY | Facility: CLINIC | Age: 79
End: 2019-10-25
Payer: COMMERCIAL

## 2019-10-25 VITALS
SYSTOLIC BLOOD PRESSURE: 172 MMHG | HEIGHT: 60 IN | BODY MASS INDEX: 21.22 KG/M2 | HEART RATE: 67 BPM | DIASTOLIC BLOOD PRESSURE: 76 MMHG | WEIGHT: 108.1 LBS

## 2019-10-25 DIAGNOSIS — I10 ESSENTIAL HYPERTENSION WITH GOAL BLOOD PRESSURE LESS THAN 140/90: Primary | ICD-10-CM

## 2019-10-25 DIAGNOSIS — I49.49 PREMATURE BEATS: ICD-10-CM

## 2019-10-25 PROCEDURE — 99214 OFFICE O/P EST MOD 30 MIN: CPT | Performed by: PHYSICIAN ASSISTANT

## 2019-10-25 PROCEDURE — 93000 ELECTROCARDIOGRAM COMPLETE: CPT | Performed by: PHYSICIAN ASSISTANT

## 2019-10-25 RX ORDER — ATENOLOL 50 MG/1
75 TABLET ORAL DAILY
Qty: 135 TABLET | Refills: 3 | Status: SHIPPED | OUTPATIENT
Start: 2019-10-25 | End: 2020-09-29

## 2019-10-25 ASSESSMENT — MIFFLIN-ST. JEOR: SCORE: 891.84

## 2019-10-25 NOTE — LETTER
"10/25/2019    Saurabh Garza PA-C  [address]  [place] [address]    RE: Karen Sanchez       Dear Colleague,    I had the pleasure of seeing Karen Sanchez in the [facility].    HPI:   I had the pleasure of seeing Karen when she came for follow up of ectopy.  This 79 year old sees Dr. Jackson for her history of:    1. 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   2. Benign Essential HTN    Karen saw Dr. Jackson 8/2018 at which time she was doing well from a palpitation standpoint. She did not tolerate higher doses of BB due to severe fatigue. Due to Dermatology's concern that she had a leg lesion due to vascular dz, US ABIs were ordered and were negative. Her BP was a bit high and PCP follow-up was recommended.     Interval History:  Really feels good from a heart perspective. Moved to a condo and has no yard work.  Still cleans and does all of the grocery shopping without discomfort.  She does feel like her chest gets \"tight\" every 3-4 months when she hasn't slept well. She thinks her  has the beginnings of dementia, which is increasing her stress level.    No c/o edema, orthopnea or PND. Does not feel like she's retaining fluid at all. Weight is up since last year, which she's glad about.      No palpitations. Doing well on the atenolol.    BPs at home are ranging from 130-170s. . No dizziness or lightheadedness.     Diagnostic Testing:  EKG today, which I overread, showed SR 66 bpm. RSR in V1  LE US 9/2018 showed <50% LE stenosis bilaterally   Stress Echocardiogam 12/2011 showed no stress-induced wall motion  Echo 8/2007 showed normal EF 55-60% and no significant valvular abnls    Assessment & Plan:    1. Ectopy    PACs and PVCs previously noted. Atenolol has been working well to control sxs    Normal EF as of 2011 (stress echo)    PLAN:    Continue atenolol, but go up on dose due to HTN    See Dr. Jackson 8/2020 as previously ordered    2. Benign Essential " HTN    BP is too high, even when I rechecked it     PLAN:    Discussed options for increase in atenolol to 75 mg daily (watching for bradycardia) vs switching atenolol to carvedilol for both alpha and beta blockade or continue atenolol 50 mg, which has worked well for ectopy and adding amlodipine for BP control    Opted for increasing atenolol at this point. Will take 75 mg daily (25 in AM and 50 in PM or 75 mg all at once) and keep track of BPs    Will call me week of 11/11 with update. Encouraged to call sooner if any significant bradycardia or dizziness      Continue low salt diet        Tiera Remy PA-C, MSPAS      Orders Placed This Encounter   Procedures     EKG 12-lead complete w/read - Clinics (performed today)     Orders Placed This Encounter   Medications     atenolol (TENORMIN) 50 MG tablet     Sig: Take 1.5 tablets (75 mg) by mouth daily     Dispense:  135 tablet     Refill:  3     Medications Discontinued During This Encounter   Medication Reason     atenolol (TENORMIN) 50 MG tablet          Encounter Diagnoses   Name Primary?     Premature beats      Essential hypertension with goal blood pressure less than 140/90 Yes       CURRENT MEDICATIONS:  Current Outpatient Medications   Medication Sig Dispense Refill     atenolol (TENORMIN) 50 MG tablet Take 1.5 tablets (75 mg) by mouth daily 135 tablet 3     Calcium Carbonate-Vitamin D (CALCIUM + D PO) Take  by mouth.       Cholecalciferol (VITAMIN D PO) Take  by mouth.         ALLERGIES     Allergies   Allergen Reactions     Sulfa Drugs Rash     Hctz      Cough, dehydration, CROSS-REACTION with sulfa allergy       Darvon [Propoxyphene Hcl] Other (See Comments)     Stiff neck         PAST MEDICAL HISTORY:  Past Medical History:   Diagnosis Date     Arm fracture, right 12/11     Essential hypertension, benign      Hypertension      PAC (premature atrial contraction)      Pneumonia 1988     PVC (premature ventricular contraction)      Recurrent Otitis media         PAST SURGICAL HISTORY:  Past Surgical History:   Procedure Laterality Date     OPEN REDUCTION INTERNAL FIXATION HUMERUS PROXIMAL  12/27/2011    Procedure:OPEN REDUCTION INTERNAL FIXATION HUMERUS PROXIMAL; OPEN REDUCTION INTERNAL FIXATION RIGHT PROXIMAL HUMERUS ; Surgeon:OCTAVIO WRIGHT; Location:[facility] OR     TONSILLECTOMY  as child       FAMILY HISTORY:  Family History   Problem Relation Age of Onset     Cerebrovascular Disease Mother         age 83     Cerebrovascular Disease Father         age  82       SOCIAL HISTORY:  Social History     Socioeconomic History     Marital status:      Spouse name: None     Number of children: None     Years of education: None     Highest education level: None   Occupational History     Occupation: retired   Social Needs     Financial resource strain: None     Food insecurity:     Worry: None     Inability: None     Transportation needs:     Medical: None     Non-medical: None   Tobacco Use     Smoking status: Never Smoker     Smokeless tobacco: Never Used   Substance and Sexual Activity     Alcohol use: No     Drug use: No     Sexual activity: Not Currently     Partners: Male   Lifestyle     Physical activity:     Days per week: None     Minutes per session: None     Stress: None   Relationships     Social connections:     Talks on phone: None     Gets together: None     Attends Anabaptist service: None     Active member of club or organization: None     Attends meetings of clubs or organizations: None     Relationship status: None     Intimate partner violence:     Fear of current or ex partner: None     Emotionally abused: None     Physically abused: None     Forced sexual activity: None   Other Topics Concern     Parent/sibling w/ CABG, MI or angioplasty before 65F 55M? Not Asked   Social History Narrative     None       Review of Systems:  Skin:  Negative     Eyes:  Positive for glasses  ENT:  Negative    Respiratory:  Negative for dyspnea on exertion;shortness of  breath  Cardiovascular:  Negative for;chest pain;syncope or near-syncope;exercise intolerance;fatigue;lightheadedness;dizziness;palpitations    Gastroenterology: Negative for melena;hematochezia  Genitourinary:  Negative    Musculoskeletal:  Negative    Neurologic:  Negative    Psychiatric:  Positive for excessive stress  Heme/Lymph/Imm:  Negative    Endocrine:  Negative      Physical Exam:  Vitals: BP (!) 172/76   Pulse 67   Ht 1.524 m (5')   Wt 49 kg (108 lb 1.6 oz)   BMI 21.11 kg/m       Constitutional:  cooperative, alert and oriented, well developed, well nourished, in no acute distress        Skin:  warm and dry to the touch   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)    Head:  normocephalic, no masses or lesions        Eyes:  pupils equal and round;conjunctivae and lids unremarkable;sclera white        ENT:  no pallor or cyanosis, dentition good        Neck:  JVP normal;no carotid bruit        Chest:  normal breath sounds, clear to auscultation, normal A-P diameter, normal symmetry, normal respiratory excursion, no use of accessory muscles        Cardiac: regular rhythm, normal S1/S2, no S3 or S4, apical impulse not displaced, no murmurs, gallops or rubs                  Abdomen:  abdomen soft        Vascular: pulses full and equal                                      Extremities and Back:  no deformities, clubbing, cyanosis, erythema observed;no edema        Neurological:  no gross motor deficits          Recent Lab Results:  LIPID RESULTS:  Lab Results   Component Value Date    CHOL 231 (H) 01/09/2013    HDL 48 (L) 01/09/2013     (H) 01/09/2013    TRIG 126 01/09/2013    CHOLHDLRATIO 4.8 01/09/2013       LIVER ENZYME RESULTS:  Lab Results   Component Value Date    AST 32 01/09/2013    ALT 42 01/09/2013       CBC RESULTS:  Lab Results   Component Value Date    WBC 8.8 12/04/2011    RBC 4.70 12/04/2011    HGB 14.0 08/08/2012    HCT 41.0 12/04/2011    MCV 87 12/04/2011    MCH 29.8 12/04/2011    [facility]  34.1 12/04/2011    RDW 12.3 12/04/2011     12/04/2011       BMP RESULTS:  Lab Results   Component Value Date     01/09/2013    POTASSIUM 4.4 01/09/2013    CHLORIDE 108 01/09/2013    CO2 26 01/09/2013    ANIONGAP 9 01/09/2013     (H) 01/09/2013    BUN 17 01/09/2013    CR 0.80 01/09/2013    GFRESTIMATED 71 01/09/2013    GFRESTBLACK 86 01/09/2013    BATSHEVA 9.4 01/09/2013        Thank you for allowing me to participate in the care of your patient.      Sincerely,     KOLBY Bianchi-C     [facility]    cc:   Yelitza Jackson MD  [address]  [address]  [place] [id number]

## 2019-10-25 NOTE — LETTER
"10/25/2019    Saurabh Garza PA-C  [address]  [place] [address]    RE: Karen Sanchez       Dear Colleague,    I had the pleasure of seeing Karen Sanchez in the [facility].    HPI:   I had the pleasure of seeing Karen when she came for follow up of ectopy.  This 79 year old sees Dr. Jackson for her history of:    1. 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   2. Benign Essential HTN    Karen saw Dr. Jackson 8/2018 at which time she was doing well from a palpitation standpoint. She did not tolerate higher doses of BB due to severe fatigue. Due to Dermatology's concern that she had a leg lesion due to vascular dz, US ABIs were ordered and were negative. Her BP was a bit high and PCP follow-up was recommended.     Interval History:  Really feels good from a heart perspective. Moved to a condo and has no yard work.  Still cleans and does all of the grocery shopping without discomfort.  She does feel like her chest gets \"tight\" every 3-4 months when she hasn't slept well. She thinks her  has the beginnings of dementia, which is increasing her stress level.    No c/o edema, orthopnea or PND. Does not feel like she's retaining fluid at all. Weight is up since last year, which she's glad about.      No palpitations. Doing well on the atenolol.    BPs at home are ranging from 130-170s. . No dizziness or lightheadedness.     Diagnostic Testing:  EKG today, which I overread, showed SR 66 bpm. RSR in V1  LE US 9/2018 showed <50% LE stenosis bilaterally   Stress Echocardiogam 12/2011 showed no stress-induced wall motion  Echo 8/2007 showed normal EF 55-60% and no significant valvular abnls    Assessment & Plan:    1. Ectopy    PACs and PVCs previously noted. Atenolol has been working well to control sxs    Normal EF as of 2011 (stress echo)    PLAN:    Continue atenolol, but go up on dose due to HTN    See Dr. Jackson 8/2020 as previously ordered    2. Benign Essential " HTN    BP is too high, even when I rechecked it     PLAN:    Discussed options for increase in atenolol to 75 mg daily (watching for bradycardia) vs switching atenolol to carvedilol for both alpha and beta blockade or continue atenolol 50 mg, which has worked well for ectopy and adding amlodipine for BP control    Opted for increasing atenolol at this point. Will take 75 mg daily (25 in AM and 50 in PM or 75 mg all at once) and keep track of BPs    Will call me week of 11/11 with update. Encouraged to call sooner if any significant bradycardia or dizziness      Continue low salt diet        Tiera Remy PA-C, MSPAS      Orders Placed This Encounter   Procedures     EKG 12-lead complete w/read - Clinics (performed today)     Orders Placed This Encounter   Medications     atenolol (TENORMIN) 50 MG tablet     Sig: Take 1.5 tablets (75 mg) by mouth daily     Dispense:  135 tablet     Refill:  3     Medications Discontinued During This Encounter   Medication Reason     atenolol (TENORMIN) 50 MG tablet          Encounter Diagnoses   Name Primary?     Premature beats      Essential hypertension with goal blood pressure less than 140/90 Yes       CURRENT MEDICATIONS:  Current Outpatient Medications   Medication Sig Dispense Refill     atenolol (TENORMIN) 50 MG tablet Take 1.5 tablets (75 mg) by mouth daily 135 tablet 3     Calcium Carbonate-Vitamin D (CALCIUM + D PO) Take  by mouth.       Cholecalciferol (VITAMIN D PO) Take  by mouth.         ALLERGIES     Allergies   Allergen Reactions     Sulfa Drugs Rash     Hctz      Cough, dehydration, CROSS-REACTION with sulfa allergy       Darvon [Propoxyphene Hcl] Other (See Comments)     Stiff neck         PAST MEDICAL HISTORY:  Past Medical History:   Diagnosis Date     Arm fracture, right 12/11     Essential hypertension, benign      Hypertension      PAC (premature atrial contraction)      Pneumonia 1988     PVC (premature ventricular contraction)      Recurrent Otitis media         PAST SURGICAL HISTORY:  Past Surgical History:   Procedure Laterality Date     OPEN REDUCTION INTERNAL FIXATION HUMERUS PROXIMAL  12/27/2011    Procedure:OPEN REDUCTION INTERNAL FIXATION HUMERUS PROXIMAL; OPEN REDUCTION INTERNAL FIXATION RIGHT PROXIMAL HUMERUS ; Surgeon:OCTAVIO WRIGHT; Location:[facility]     TONSILLECTOMY  as child       FAMILY HISTORY:  Family History   Problem Relation Age of Onset     Cerebrovascular Disease Mother         age 83     Cerebrovascular Disease Father         age  82       SOCIAL HISTORY:  Social History     Socioeconomic History     Marital status:      Spouse name: None     Number of children: None     Years of education: None     Highest education level: None   Occupational History     Occupation: retired   Social Needs     Financial resource strain: None     Food insecurity:     Worry: None     Inability: None     Transportation needs:     Medical: None     Non-medical: None   Tobacco Use     Smoking status: Never Smoker     Smokeless tobacco: Never Used   Substance and Sexual Activity     Alcohol use: No     Drug use: No     Sexual activity: Not Currently     Partners: Male   Lifestyle     Physical activity:     Days per week: None     Minutes per session: None     Stress: None   Relationships     Social connections:     Talks on phone: None     Gets together: None     Attends Yarsani service: None     Active member of club or organization: None     Attends meetings of clubs or organizations: None     Relationship status: None     Intimate partner violence:     Fear of current or ex partner: None     Emotionally abused: None     Physically abused: None     Forced sexual activity: None   Other Topics Concern     Parent/sibling w/ CABG, MI or angioplasty before 65F 55M? Not Asked   Social History Narrative     None       Review of Systems:  Skin:  Negative     Eyes:  Positive for glasses  ENT:  Negative    Respiratory:  Negative for dyspnea on exertion;shortness of  breath  Cardiovascular:  Negative for;chest pain;syncope or near-syncope;exercise intolerance;fatigue;lightheadedness;dizziness;palpitations    Gastroenterology: Negative for melena;hematochezia  Genitourinary:  Negative    Musculoskeletal:  Negative    Neurologic:  Negative    Psychiatric:  Positive for excessive stress  Heme/Lymph/Imm:  Negative    Endocrine:  Negative      Physical Exam:  Vitals: BP (!) 172/76   Pulse 67   Ht 1.524 m (5')   Wt 49 kg (108 lb 1.6 oz)   BMI 21.11 kg/m       Constitutional:  cooperative, alert and oriented, well developed, well nourished, in no acute distress        Skin:  warm and dry to the touch   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)    Head:  normocephalic, no masses or lesions        Eyes:  pupils equal and round;conjunctivae and lids unremarkable;sclera white        ENT:  no pallor or cyanosis, dentition good        Neck:  JVP normal;no carotid bruit        Chest:  normal breath sounds, clear to auscultation, normal A-P diameter, normal symmetry, normal respiratory excursion, no use of accessory muscles        Cardiac: regular rhythm, normal S1/S2, no S3 or S4, apical impulse not displaced, no murmurs, gallops or rubs                  Abdomen:  abdomen soft        Vascular: pulses full and equal                                      Extremities and Back:  no deformities, clubbing, cyanosis, erythema observed;no edema        Neurological:  no gross motor deficits          Recent Lab Results:  LIPID RESULTS:  Lab Results   Component Value Date    CHOL 231 (H) 01/09/2013    HDL 48 (L) 01/09/2013     (H) 01/09/2013    TRIG 126 01/09/2013    CHOLHDLRATIO 4.8 01/09/2013       LIVER ENZYME RESULTS:  Lab Results   Component Value Date    AST 32 01/09/2013    ALT 42 01/09/2013       CBC RESULTS:  Lab Results   Component Value Date    WBC 8.8 12/04/2011    RBC 4.70 12/04/2011    HGB 14.0 08/08/2012    HCT 41.0 12/04/2011    MCV 87 12/04/2011    MCH 29.8 12/04/2011    [facility]  34.1 12/04/2011    RDW 12.3 12/04/2011     12/04/2011       BMP RESULTS:  Lab Results   Component Value Date     01/09/2013    POTASSIUM 4.4 01/09/2013    CHLORIDE 108 01/09/2013    CO2 26 01/09/2013    ANIONGAP 9 01/09/2013     (H) 01/09/2013    BUN 17 01/09/2013    CR 0.80 01/09/2013    GFRESTIMATED 71 01/09/2013    GFRESTBLACK 86 01/09/2013    BATSHEVA 9.4 01/09/2013            Thank you for allowing me to participate in the care of your patient.    Sincerely,     Saundra Remy PA-C     [facility]

## 2019-10-25 NOTE — PATIENT INSTRUCTIONS
1. EKG today looked great - normal rhythm    2. Discussed the BP that is too high. We could slightly increase the atenolol to 75 mg daily. We could continue atenolol and add a BP medicine called amlodipine OR we could replace the atenolol with a medicine for both heart rate and BP (carvedilol).    3. Decided to increase the atenolol to 75 mg daily. OK to take 25 mg in AM and 50 mg in PM OR take all 75 mg at night   Check BP at least an hour after taking meds to see how they're working   Call me ~2 weeks with update - week of 11/11/2019. IF BP still too high or you're not doing well, we can change other things!  [phone]

## 2019-11-14 ENCOUNTER — TELEPHONE (OUTPATIENT)
Dept: CARDIOLOGY | Facility: CLINIC | Age: 79
End: 2019-11-14

## 2019-11-14 NOTE — TELEPHONE ENCOUNTER
Patient called with an update on home BP and HR since increase in her atenolol d/t elevated BP readings.  BP readings ranging systolic 117/145 over diastolic 63-73 and HR varied.  She was specific that she has ha majority of her HR have been in the 60's and 7 times below 60.  She is concerned with her HR being in the 60's or below.  She is not having symptoms when HR in the 50's.  Patient is taking medication as directed 25mg in the am and 50mg in the evening.  Will update KOLBY Severino regarding above.  YASMEEN Mast

## 2019-11-14 NOTE — TELEPHONE ENCOUNTER
As long as HRs are not causing sxs of lightheadedness or dizziness, would not make any changes.    It appears that BPs are much better.    No changes from my perspective unless something changes.    Akbar Mott

## 2019-11-15 NOTE — TELEPHONE ENCOUNTER
Spoke to patient to let her know that Tiera did not want to make any changes at this time was happy with BP readings.   Patient denies symptoms when HR are in the 50's.  Recommended that she continue monitor BP and HR and if she devleops symptoms or numbers are trending up patient to call.  Patient provided verbal understanding.  Patient to follow up in 1 year.  YASMEEN Mast

## 2020-09-29 ENCOUNTER — OFFICE VISIT (OUTPATIENT)
Dept: CARDIOLOGY | Facility: CLINIC | Age: 80
End: 2020-09-29
Attending: INTERNAL MEDICINE
Payer: COMMERCIAL

## 2020-09-29 VITALS
DIASTOLIC BLOOD PRESSURE: 70 MMHG | WEIGHT: 108 LBS | BODY MASS INDEX: 21.09 KG/M2 | HEART RATE: 82 BPM | SYSTOLIC BLOOD PRESSURE: 179 MMHG

## 2020-09-29 DIAGNOSIS — I10 ESSENTIAL HYPERTENSION WITH GOAL BLOOD PRESSURE LESS THAN 140/90: ICD-10-CM

## 2020-09-29 DIAGNOSIS — I49.49 PREMATURE BEATS: ICD-10-CM

## 2020-09-29 PROCEDURE — 99214 OFFICE O/P EST MOD 30 MIN: CPT | Performed by: INTERNAL MEDICINE

## 2020-09-29 RX ORDER — ATENOLOL 100 MG/1
50 TABLET ORAL DAILY
Qty: 90 TABLET | Refills: 3 | Status: SHIPPED | OUTPATIENT
Start: 2020-09-29 | End: 2021-10-29

## 2020-09-29 RX ORDER — AMLODIPINE BESYLATE 10 MG/1
10 TABLET ORAL DAILY
Qty: 60 TABLET | Refills: 3 | Status: SHIPPED | OUTPATIENT
Start: 2020-09-29 | End: 2021-10-29

## 2020-09-29 NOTE — LETTER
2020      Saurabh Garza PA-C  [address] [place] [address] Dr  [place] [address]      RE: Promise SALINAS Daniel       Dear Colleague,    I had the pleasure of seeing Promise Collier in the [facility].    Service Date: 2020      HISTORY OF PRESENT ILLNESS:  I saw Ms. Collier for followup of PACs and PVCs.  She has symptomatic PACs and PVCs.  She is on atenolol 50 mg p.o. daily.  She could not tolerate atenolol 75 mg p.o. daily in the past.  At the present time, she only feels occasional palpitation.  She has no shortness of breath or chest pain.  Overall, she has been quite stable over the last year.      PHYSICAL EXAMINATION:  Blood pressure was 179/70, heart rate 82 beats per minute, body weight 108 pounds.  Cardiovascular examination showed no remarkable abnormalities.      ASSESSMENT AND RECOMMENDATIONS:  Ms. Collier is doing reasonably well from the arrhythmia point of view.  She can continue atenolol 50 mg daily and may have to accept occasional minor palpitations.      Her blood pressure is quite elevated over the last few years.  She had an adverse reaction to hydrochlorothiazide previously.  I put her on amlodipine 10 mg p.o. daily.  She may need further adjustment of blood pressure medications in your office.      She is scheduled to return for Cardiology followup in a year.  If she is doing well, she can continue to see my nurse practitioner annually and see me only as needed.      cc:   Saurabh Garza PA-C   [address]  [address]         DARIUSZ BECKER MD         D: 2020   T: 2020   MT: anthony      Name:     PROMISE COLLIER   MRN:      [id number]        Account:      [id number]   :      1940           Service Date: 2020      Document: [id number]        Outpatient Encounter Medications as of 2020   Medication Sig Dispense Refill     amLODIPine (NORVASC) 10 MG tablet Take 1 tablet (10 mg) by  mouth daily 60 tablet 3     atenolol (TENORMIN) 100 MG tablet Take 0.5 tablets (50 mg) by mouth daily 90 tablet 3     Calcium Carbonate-Vitamin D (CALCIUM + D PO) Take  by mouth.       Cholecalciferol (VITAMIN D PO) Take  by mouth.       [DISCONTINUED] atenolol (TENORMIN) 50 MG tablet Take 1.5 tablets (75 mg) by mouth daily (Patient taking differently: Take 75 mg by mouth daily Usually takes 50mg every day and about 1x a week takes the 75mg) 135 tablet 3     No facility-administered encounter medications on file as of 9/29/2020.        Again, thank you for allowing me to participate in the care of your patient.      Sincerely,    Yelitza Jackson MD     [facility]

## 2020-09-29 NOTE — PROGRESS NOTES
HPI and Plan:   See dictation    Orders Placed This Encounter   Procedures     Follow-Up with Cardiac Advanced Practice Provider     EKG 12-lead complete w/read (Future)- to be scheduled       Orders Placed This Encounter   Medications     amLODIPine (NORVASC) 10 MG tablet     Sig: Take 1 tablet (10 mg) by mouth daily     Dispense:  60 tablet     Refill:  3       There are no discontinued medications.      Encounter Diagnoses   Name Primary?     Premature beats      Essential hypertension with goal blood pressure less than 140/90        CURRENT MEDICATIONS:  Current Outpatient Medications   Medication Sig Dispense Refill     amLODIPine (NORVASC) 10 MG tablet Take 1 tablet (10 mg) by mouth daily 60 tablet 3     atenolol (TENORMIN) 50 MG tablet Take 1.5 tablets (75 mg) by mouth daily (Patient taking differently: Take 75 mg by mouth daily Usually takes 50mg every day and about 1x a week takes the 75mg) 135 tablet 3     Calcium Carbonate-Vitamin D (CALCIUM + D PO) Take  by mouth.       Cholecalciferol (VITAMIN D PO) Take  by mouth.         ALLERGIES     Allergies   Allergen Reactions     Sulfa Drugs Rash     Hctz      Cough, dehydration, CROSS-REACTION with sulfa allergy       Darvon [Propoxyphene Hcl] Other (See Comments)     Stiff neck         PAST MEDICAL HISTORY:  Past Medical History:   Diagnosis Date     Arm fracture, right 12/11     Essential hypertension, benign      Hypertension      PAC (premature atrial contraction)      Pneumonia 1988     PVC (premature ventricular contraction)      Recurrent Otitis media        PAST SURGICAL HISTORY:  Past Surgical History:   Procedure Laterality Date     OPEN REDUCTION INTERNAL FIXATION HUMERUS PROXIMAL  12/27/2011    Procedure:OPEN REDUCTION INTERNAL FIXATION HUMERUS PROXIMAL; OPEN REDUCTION INTERNAL FIXATION RIGHT PROXIMAL HUMERUS ; Surgeon:OCTAVIO WRIGHT; Location:[facility]     TONSILLECTOMY  as child       FAMILY HISTORY:  Family History   Problem Relation Age of Onset      Cerebrovascular Disease Mother         age 83     Cerebrovascular Disease Father         age  82       SOCIAL HISTORY:  Social History     Socioeconomic History     Marital status:      Spouse name: None     Number of children: None     Years of education: None     Highest education level: None   Occupational History     Occupation: retired   Social Needs     Financial resource strain: None     Food insecurity     Worry: None     Inability: None     Transportation needs     Medical: None     Non-medical: None   Tobacco Use     Smoking status: Never Smoker     Smokeless tobacco: Never Used   Substance and Sexual Activity     Alcohol use: No     Drug use: No     Sexual activity: Not Currently     Partners: Male   Lifestyle     Physical activity     Days per week: None     Minutes per session: None     Stress: None   Relationships     Social connections     Talks on phone: None     Gets together: None     Attends Sikh service: None     Active member of club or organization: None     Attends meetings of clubs or organizations: None     Relationship status: None     Intimate partner violence     Fear of current or ex partner: None     Emotionally abused: None     Physically abused: None     Forced sexual activity: None   Other Topics Concern     Parent/sibling w/ CABG, MI or angioplasty before 65F 55M? Not Asked   Social History Narrative     None       Review of Systems:  Skin:  Negative       Eyes:  Positive for glasses    ENT:  Negative      Respiratory:  Negative for dyspnea on exertion     Cardiovascular:    Positive for;palpitations;fatigue    Gastroenterology: Negative      Genitourinary:  not assessed      Musculoskeletal:  Negative      Neurologic:  Negative      Psychiatric:  not assessed      Heme/Lymph/Imm:  Negative      Endocrine:  Negative        Physical Exam:  Vitals: BP (!) 179/70   Pulse 82   Wt 49 kg (108 lb)   BMI 21.09 kg/m      Constitutional:  cooperative, alert and oriented,  well developed, well nourished, in no acute distress        Skin:  warm and dry to the touch     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)    Head:  normocephalic, no masses or lesions        Eyes:  pupils equal and round;conjunctivae and lids unremarkable;sclera white        Lymph:      ENT:  no pallor or cyanosis, dentition good        Neck:  JVP normal;no carotid bruit        Respiratory:  normal breath sounds, clear to auscultation, normal A-P diameter, normal symmetry, normal respiratory excursion, no use of accessory muscles         Cardiac: regular rhythm, normal S1/S2, no S3 or S4, apical impulse not displaced, no murmurs, gallops or rubs                pulses full and equal                                        GI:  abdomen soft        Extremities and Muscular Skeletal:  no deformities, clubbing, cyanosis, erythema observed;no edema              Neurological:  no gross motor deficits        Psych:  Alert and Oriented x 3        CC  Yelitza Jackson MD  [address]  [place] [address]

## 2020-09-29 NOTE — LETTER
9/29/2020    Saurabh Garza PA-C  [address]  [place] [address]    RE: Karen Sanchez       Dear Colleague,    I had the pleasure of seeing Karen Sanchez in the [facility].    HPI and Plan:   See dictation    Orders Placed This Encounter   Procedures     Follow-Up with Cardiac Advanced Practice Provider     EKG 12-lead complete w/read (Future)- to be scheduled       Orders Placed This Encounter   Medications     amLODIPine (NORVASC) 10 MG tablet     Sig: Take 1 tablet (10 mg) by mouth daily     Dispense:  60 tablet     Refill:  3       There are no discontinued medications.      Encounter Diagnoses   Name Primary?     Premature beats      Essential hypertension with goal blood pressure less than 140/90        CURRENT MEDICATIONS:  Current Outpatient Medications   Medication Sig Dispense Refill     amLODIPine (NORVASC) 10 MG tablet Take 1 tablet (10 mg) by mouth daily 60 tablet 3     atenolol (TENORMIN) 50 MG tablet Take 1.5 tablets (75 mg) by mouth daily (Patient taking differently: Take 75 mg by mouth daily Usually takes 50mg every day and about 1x a week takes the 75mg) 135 tablet 3     Calcium Carbonate-Vitamin D (CALCIUM + D PO) Take  by mouth.       Cholecalciferol (VITAMIN D PO) Take  by mouth.         ALLERGIES     Allergies   Allergen Reactions     Sulfa Drugs Rash     Hctz      Cough, dehydration, CROSS-REACTION with sulfa allergy       Darvon [Propoxyphene Hcl] Other (See Comments)     Stiff neck         PAST MEDICAL HISTORY:  Past Medical History:   Diagnosis Date     Arm fracture, right 12/11     Essential hypertension, benign      Hypertension      PAC (premature atrial contraction)      Pneumonia 1988     PVC (premature ventricular contraction)      Recurrent Otitis media        PAST SURGICAL HISTORY:  Past Surgical History:   Procedure Laterality Date     OPEN REDUCTION INTERNAL FIXATION HUMERUS PROXIMAL  12/27/2011    Procedure:OPEN REDUCTION  INTERNAL FIXATION HUMERUS PROXIMAL; OPEN REDUCTION INTERNAL FIXATION RIGHT PROXIMAL HUMERUS ; Surgeon:OCTAVIO WRIGHT; Location:[facility]     TONSILLECTOMY  as child       FAMILY HISTORY:  Family History   Problem Relation Age of Onset     Cerebrovascular Disease Mother         age 83     Cerebrovascular Disease Father         age  82       SOCIAL HISTORY:  Social History     Socioeconomic History     Marital status:      Spouse name: None     Number of children: None     Years of education: None     Highest education level: None   Occupational History     Occupation: retired   Social Needs     Financial resource strain: None     Food insecurity     Worry: None     Inability: None     Transportation needs     Medical: None     Non-medical: None   Tobacco Use     Smoking status: Never Smoker     Smokeless tobacco: Never Used   Substance and Sexual Activity     Alcohol use: No     Drug use: No     Sexual activity: Not Currently     Partners: Male   Lifestyle     Physical activity     Days per week: None     Minutes per session: None     Stress: None   Relationships     Social connections     Talks on phone: None     Gets together: None     Attends Pentecostal service: None     Active member of club or organization: None     Attends meetings of clubs or organizations: None     Relationship status: None     Intimate partner violence     Fear of current or ex partner: None     Emotionally abused: None     Physically abused: None     Forced sexual activity: None   Other Topics Concern     Parent/sibling w/ CABG, MI or angioplasty before 65F 55M? Not Asked   Social History Narrative     None       Review of Systems:  Skin:  Negative       Eyes:  Positive for glasses    ENT:  Negative      Respiratory:  Negative for dyspnea on exertion     Cardiovascular:    Positive for;palpitations;fatigue    Gastroenterology: Negative      Genitourinary:  not assessed      Musculoskeletal:  Negative      Neurologic:  Negative       Psychiatric:  not assessed      Heme/Lymph/Imm:  Negative      Endocrine:  Negative        Physical Exam:  Vitals: BP (!) 179/70   Pulse 82   Wt 49 kg (108 lb)   BMI 21.09 kg/m      Constitutional:  cooperative, alert and oriented, well developed, well nourished, in no acute distress        Skin:  warm and dry to the touch     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)    Head:  normocephalic, no masses or lesions        Eyes:  pupils equal and round;conjunctivae and lids unremarkable;sclera white        Lymph:      ENT:  no pallor or cyanosis, dentition good        Neck:  JVP normal;no carotid bruit        Respiratory:  normal breath sounds, clear to auscultation, normal A-P diameter, normal symmetry, normal respiratory excursion, no use of accessory muscles         Cardiac: regular rhythm, normal S1/S2, no S3 or S4, apical impulse not displaced, no murmurs, gallops or rubs                pulses full and equal                                        GI:  abdomen soft        Extremities and Muscular Skeletal:  no deformities, clubbing, cyanosis, erythema observed;no edema              Neurological:  no gross motor deficits        Psych:  Alert and Oriented x 3        CC  Yelitza Jackson MD  [address]  [place] [address]                Thank you for allowing me to participate in the care of your patient.      Sincerely,     Yelitza Jackson MD     [facility]    cc:   Yelitza Jackson MD  [address]  [address]  [place] [address]

## 2020-09-29 NOTE — PROGRESS NOTES
Service Date: 2020      HISTORY OF PRESENT ILLNESS:  I saw Ms. Collier for followup of PACs and PVCs.  She has symptomatic PACs and PVCs.  She is on atenolol 50 mg p.o. daily.  She could not tolerate atenolol 75 mg p.o. daily in the past.  At the present time, she only feels occasional palpitation.  She has no shortness of breath or chest pain.  Overall, she has been quite stable over the last year.      PHYSICAL EXAMINATION:  Blood pressure was 179/70, heart rate 82 beats per minute, body weight 108 pounds.  Cardiovascular examination showed no remarkable abnormalities.      ASSESSMENT AND RECOMMENDATIONS:  Ms. Collier is doing reasonably well from the arrhythmia point of view.  She can continue atenolol 50 mg daily and may have to accept occasional minor palpitations.      Her blood pressure is quite elevated over the last few years.  She had an adverse reaction to hydrochlorothiazide previously.  I put her on amlodipine 10 mg p.o. daily.  She may need further adjustment of blood pressure medications in your office.      She is scheduled to return for Cardiology followup in a year.  If she is doing well, she can continue to see my nurse practitioner annually and see me only as needed.      cc:   Saurabh Garza PA-C   [address] [place]  [address]         DARIUSZ BECKER MD             D: 2020   T: 2020   MT: anthony      Name:     PROMISE COLLIER   MRN:      [id number]        Account:      [id number]   :      1940           Service Date: 2020      Document: [id number]

## 2021-01-28 ENCOUNTER — TELEPHONE (OUTPATIENT)
Dept: FAMILY MEDICINE | Facility: CLINIC | Age: 81
End: 2021-01-28

## 2021-01-28 NOTE — TELEPHONE ENCOUNTER
Patient outreach call:  Please reach out to Karen and recommend that she come to the clinic for a nurse only BP check.

## 2021-01-29 NOTE — TELEPHONE ENCOUNTER
Spoke with the pt and gave her the message below. She states she checks her BP at home and her  is not doing well so she will not be able to come in at this time.  Meagan Mathews,

## 2021-10-27 NOTE — PROGRESS NOTES
[facility]    I had the pleasure of seeing Karen when she came for annual follow up.  This 81 year old sees Dr. Jackson for her history of:      1. 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   2. Benign Essential HTN      I saw Karen 10/2019 at which time she'd just moved to a condo and was pleased to not be doing yard work. No changes were made and she saw Dr. Jackson 9/2020 at which time she had just mild palpitations but overall, atenolol 50 mg daily was working well. BP was very high, and amlodipine was started. Annual follow-up planned.     Interval History:  Karen is really doing well, without issues of CP, SOB. Thinks atenolol does a good job at controlling her palpitations. No dizziness, lightheadedness or falls.     She never ended up starting the amlodipine that Dr. Jackson started last year b/c the pharmacist told her it would make her dizzy.  She brings list of BPs and they're really good so far this yearr, ranging 120-160s, with majority in 120-130s. No falling.     No edema, orthopnea, PND.       VITALS:  Vitals: BP (!) 178/71   Pulse 69   Ht 1.524 m (5')   Wt 46.7 kg (103 lb)   SpO2 97%   BMI 20.12 kg/m      Diagnostic Testing:  EKG today, which I overread, showed SR 68 bpm  LE US 9/2018 showed <50% LE stenosis bilaterally   Stress Echocardiogam 12/2011 showed no stress-induced wall motion  Echo 8/2007 showed normal EF 55-60% and no significant valvular abnls    Plan:  1. See me 1 month and bring BP cuff to check accurac    Assessment/Plan:    1. Ectopy    Well controlled on atenolol    No dizziness, lightheadedness     PLAN:    Continue atenolol 50 mg daily - new Rx sent in    See us 1 year with EKG      2. HTN    BP way too high here, but better at home. Has never had her cuff checked for accuracy    Remains on only atenolol; never started the amlodipine Dr. Jackson recommended    PLAN:    See me 1 m and bring BP cuff to check accuracy        Tiera Remy PA-C, MSPAS      Orders Placed This  Encounter   Procedures     Follow-Up with Cardiac Advanced Practice Provider     Follow-Up with Cardiac Advanced Practice Provider     EKG 12-lead complete w/read - Clinics (to be scheduled)     Orders Placed This Encounter   Medications     atenolol (TENORMIN) 50 MG tablet     Sig: Take 1 tablet (50 mg) by mouth daily     Dispense:  90 tablet     Refill:  3     Medications Discontinued During This Encounter   Medication Reason     amLODIPine (NORVASC) 10 MG tablet Not filled/taken by Patient     atenolol (TENORMIN) 100 MG tablet          Encounter Diagnoses   Name Primary?     Premature beats      Essential hypertension with goal blood pressure less than 140/90        CURRENT MEDICATIONS:  Current Outpatient Medications   Medication Sig Dispense Refill     atenolol (TENORMIN) 50 MG tablet Take 1 tablet (50 mg) by mouth daily 90 tablet 3     Calcium Carbonate-Vitamin D (CALCIUM + D PO) Take  by mouth.       Cholecalciferol (VITAMIN D PO) Take  by mouth.         ALLERGIES     Allergies   Allergen Reactions     Sulfa Drugs Rash     Hctz      Cough, dehydration, CROSS-REACTION with sulfa allergy       Darvon [Propoxyphene Hcl] Other (See Comments)     Stiff neck           Review of Systems:  Skin:        Eyes:       ENT:       Respiratory:       Cardiovascular:       Gastroenterology:      Genitourinary:       Musculoskeletal:       Neurologic:       Psychiatric:       Heme/Lymph/Imm:       Endocrine:         Physical Exam:  Vitals: BP (!) 178/71   Pulse 69   Ht 1.524 m (5')   Wt 46.7 kg (103 lb)   SpO2 97%   BMI 20.12 kg/m      Constitutional:           Skin:           Head:           Eyes:           ENT:           Neck:           Chest:           Cardiac:                    Abdomen:           Vascular:                                        Extremities and Back:           Neurological:               PAST MEDICAL HISTORY:  Past Medical History:   Diagnosis Date     Arm fracture, right 12/11     Essential  hypertension, benign      Hypertension      PAC (premature atrial contraction)      Pneumonia 1988     PVC (premature ventricular contraction)      Recurrent Otitis media        PAST SURGICAL HISTORY:  Past Surgical History:   Procedure Laterality Date     OPEN REDUCTION INTERNAL FIXATION HUMERUS PROXIMAL  12/27/2011    Procedure:OPEN REDUCTION INTERNAL FIXATION HUMERUS PROXIMAL; OPEN REDUCTION INTERNAL FIXATION RIGHT PROXIMAL HUMERUS ; Surgeon:OCTAVIO WRIGHT; Location:[facility]     TONSILLECTOMY  as child       FAMILY HISTORY:  Family History   Problem Relation Age of Onset     Cerebrovascular Disease Mother         age 83     Cerebrovascular Disease Father         age  82       SOCIAL HISTORY:  Social History     Socioeconomic History     Marital status:      Spouse name: None     Number of children: None     Years of education: None     Highest education level: None   Occupational History     Occupation: retired   Tobacco Use     Smoking status: Never Smoker     Smokeless tobacco: Never Used   Substance and Sexual Activity     Alcohol use: No     Drug use: No     Sexual activity: Not Currently     Partners: Male   Other Topics Concern     Parent/sibling w/ CABG, MI or angioplasty before 65F 55M? Not Asked   Social History Narrative     None     Social Determinants of Health     Financial Resource Strain:      Difficulty of Paying Living Expenses:    Food Insecurity:      Worried About Running Out of Food in the Last Year:      Ran Out of Food in the Last Year:    Transportation Needs:      Lack of Transportation (Medical):      Lack of Transportation (Non-Medical):    Physical Activity:      Days of Exercise per Week:      Minutes of Exercise per Session:    Stress:      Feeling of Stress :    Social Connections:      Frequency of Communication with Friends and Family:      Frequency of Social Gatherings with Friends and Family:      Attends Bahai Services:      Active Member of Clubs or Organizations:       Attends Club or Organization Meetings:      Marital Status:    Intimate Partner Violence:      Fear of Current or Ex-Partner:      Emotionally Abused:      Physically Abused:      Sexually Abused:

## 2021-10-29 ENCOUNTER — OFFICE VISIT (OUTPATIENT)
Dept: CARDIOLOGY | Facility: CLINIC | Age: 81
End: 2021-10-29
Payer: COMMERCIAL

## 2021-10-29 VITALS
HEART RATE: 69 BPM | BODY MASS INDEX: 20.22 KG/M2 | DIASTOLIC BLOOD PRESSURE: 72 MMHG | WEIGHT: 103 LBS | HEIGHT: 60 IN | OXYGEN SATURATION: 97 % | SYSTOLIC BLOOD PRESSURE: 178 MMHG

## 2021-10-29 DIAGNOSIS — I49.49 PREMATURE BEATS: ICD-10-CM

## 2021-10-29 DIAGNOSIS — I10 ESSENTIAL HYPERTENSION WITH GOAL BLOOD PRESSURE LESS THAN 140/90: ICD-10-CM

## 2021-10-29 PROCEDURE — 93000 ELECTROCARDIOGRAM COMPLETE: CPT | Performed by: INTERNAL MEDICINE

## 2021-10-29 PROCEDURE — 99214 OFFICE O/P EST MOD 30 MIN: CPT | Performed by: PHYSICIAN ASSISTANT

## 2021-10-29 RX ORDER — ATENOLOL 50 MG/1
50 TABLET ORAL DAILY
Qty: 90 TABLET | Refills: 3 | Status: SHIPPED | OUTPATIENT
Start: 2021-10-29 | End: 2022-09-29

## 2021-10-29 ASSESSMENT — MIFFLIN-ST. JEOR: SCORE: 858.7

## 2021-10-29 NOTE — PATIENT INSTRUCTIONS
Karen - it was great to see you today!    1. Reviewed that the atenolol Is working well to control your palpitations. This is good news!  2. EKG today showed normal rhythm    PLAN:  1. Keep checking BP.   2. See me in 1 month to check BP - BRING BP CUFF IN to test accuracy!  3. I refilled the atenolol 50 mg daily    4. See us back 1 year but CALL if issues prior! [phone]

## 2021-10-29 NOTE — LETTER
10/29/2021    Saurabh Garza PA-C  [address]  [place] [address]    RE: Karen Sanchez       Dear Colleague,    I had the pleasure of seeing Karen Sanchez in the [facility].    [facility]    I had the pleasure of seeing Karen when she came for annual follow up.  This 81 year old sees Dr. Jackson for her history of:      1. 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   2. Benign Essential HTN      I saw Karen 10/2019 at which time she'd just moved to a condo and was pleased to not be doing yard work. No changes were made and she saw Dr. Jackson 9/2020 at which time she had just mild palpitations but overall, atenolol 50 mg daily was working well. BP was very high, and amlodipine was started. Annual follow-up planned.     Interval History:  Karen is really doing well, without issues of CP, SOB. Thinks atenolol does a good job at controlling her palpitations. No dizziness, lightheadedness or falls.     She never ended up starting the amlodipine that Dr. Jackson started last year b/c the pharmacist told her it would make her dizzy.  She brings list of BPs and they're really good so far this yearr, ranging 120-160s, with majority in 120-130s. No falling.     No edema, orthopnea, PND.       VITALS:  Vitals: BP (!) 178/71   Pulse 69   Ht 1.524 m (5')   Wt 46.7 kg (103 lb)   SpO2 97%   BMI 20.12 kg/m      Diagnostic Testing:  EKG today, which I overread, showed SR 68 bpm  LE US 9/2018 showed <50% LE stenosis bilaterally   Stress Echocardiogam 12/2011 showed no stress-induced wall motion  Echo 8/2007 showed normal EF 55-60% and no significant valvular abnls    Plan:  1. See me 1 month and bring BP cuff to check accurac    Assessment/Plan:    1. Ectopy    Well controlled on atenolol    No dizziness, lightheadedness     PLAN:    Continue atenolol 50 mg daily - new Rx sent in    See us 1 year with EKG      2. HTN    BP way too high  here, but better at home. Has never had her cuff checked for accuracy    Remains on only atenolol; never started the amlodipine Dr. Jackson recommended    PLAN:    See me 1 m and bring BP cuff to check accuracy        Tiera Remy PA-C, MSPAS      Orders Placed This Encounter   Procedures     Follow-Up with Cardiac Advanced Practice Provider     Follow-Up with Cardiac Advanced Practice Provider     EKG 12-lead complete w/read - Clinics (to be scheduled)     Orders Placed This Encounter   Medications     atenolol (TENORMIN) 50 MG tablet     Sig: Take 1 tablet (50 mg) by mouth daily     Dispense:  90 tablet     Refill:  3     Medications Discontinued During This Encounter   Medication Reason     amLODIPine (NORVASC) 10 MG tablet Not filled/taken by Patient     atenolol (TENORMIN) 100 MG tablet          Encounter Diagnoses   Name Primary?     Premature beats      Essential hypertension with goal blood pressure less than 140/90        CURRENT MEDICATIONS:  Current Outpatient Medications   Medication Sig Dispense Refill     atenolol (TENORMIN) 50 MG tablet Take 1 tablet (50 mg) by mouth daily 90 tablet 3     Calcium Carbonate-Vitamin D (CALCIUM + D PO) Take  by mouth.       Cholecalciferol (VITAMIN D PO) Take  by mouth.         ALLERGIES     Allergies   Allergen Reactions     Sulfa Drugs Rash     Hctz      Cough, dehydration, CROSS-REACTION with sulfa allergy       Darvon [Propoxyphene Hcl] Other (See Comments)     Stiff neck           Review of Systems:  Skin:        Eyes:       ENT:       Respiratory:       Cardiovascular:       Gastroenterology:      Genitourinary:       Musculoskeletal:       Neurologic:       Psychiatric:       Heme/Lymph/Imm:       Endocrine:         Physical Exam:  Vitals: BP (!) 178/71   Pulse 69   Ht 1.524 m (5')   Wt 46.7 kg (103 lb)   SpO2 97%   BMI 20.12 kg/m      Constitutional:           Skin:           Head:           Eyes:           ENT:           Neck:           Chest:           Cardiac:                     Abdomen:           Vascular:                                        Extremities and Back:           Neurological:               PAST MEDICAL HISTORY:  Past Medical History:   Diagnosis Date     Arm fracture, right 12/11     Essential hypertension, benign      Hypertension      PAC (premature atrial contraction)      Pneumonia 1988     PVC (premature ventricular contraction)      Recurrent Otitis media        PAST SURGICAL HISTORY:  Past Surgical History:   Procedure Laterality Date     OPEN REDUCTION INTERNAL FIXATION HUMERUS PROXIMAL  12/27/2011    Procedure:OPEN REDUCTION INTERNAL FIXATION HUMERUS PROXIMAL; OPEN REDUCTION INTERNAL FIXATION RIGHT PROXIMAL HUMERUS ; Surgeon:OCTAVIO WRIGHT; Location:[facility] OR     TONSILLECTOMY  as child       FAMILY HISTORY:  Family History   Problem Relation Age of Onset     Cerebrovascular Disease Mother         age 83     Cerebrovascular Disease Father         age  82       SOCIAL HISTORY:  Social History     Socioeconomic History     Marital status:      Spouse name: None     Number of children: None     Years of education: None     Highest education level: None   Occupational History     Occupation: retired   Tobacco Use     Smoking status: Never Smoker     Smokeless tobacco: Never Used   Substance and Sexual Activity     Alcohol use: No     Drug use: No     Sexual activity: Not Currently     Partners: Male   Other Topics Concern     Parent/sibling w/ CABG, MI or angioplasty before 65F 55M? Not Asked   Social History Narrative     None     Social Determinants of Health     Financial Resource Strain:      Difficulty of Paying Living Expenses:    Food Insecurity:      Worried About Running Out of Food in the Last Year:      Ran Out of Food in the Last Year:    Transportation Needs:      Lack of Transportation (Medical):      Lack of Transportation (Non-Medical):    Physical Activity:      Days of Exercise per Week:      Minutes of Exercise per Session:     Stress:      Feeling of Stress :    Social Connections:      Frequency of Communication with Friends and Family:      Frequency of Social Gatherings with Friends and Family:      Attends Sikh Services:      Active Member of Clubs or Organizations:      Attends Club or Organization Meetings:      Marital Status:    Intimate Partner Violence:      Fear of Current or Ex-Partner:      Emotionally Abused:      Physically Abused:      Sexually Abused:        Thank you for allowing me to participate in the care of your patient.      Sincerely,     Saundra Remy PA-C     [facility]  cc:   Yelitza Jackson MD  [address]  [address] [place] [id number]

## 2021-12-01 NOTE — PROGRESS NOTES
"[facility]    I had the pleasure of seeing Karen when she came for follow up of BP concerns.  This 81 year old sees Dr. Jackson for her history of:    1. 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   2. Benign Essential HTN      I just saw Karen 10/2021 at which time she was doing well on atenolol. She had not started the amlodipine recommended by Dr. Jackson as BPs at home are running 120s-160s, with majority 120-130s.  As her BP was very high at the office, I recommended that she come back in a month and see me to check the accuracy of her BP cuff.    Interval History:  Karen feels \"tired\" today, noting she's felt more fatigued since 11/2021. No trouble sleeping and feels rested when she wakes up.     Otherwise, she feels like she's doing well - no CP, SOB. No dizziness, lightheadedness or syncope. Palpitations remain under good control. No edema/orthonpea.    VITALS:  Vitals: BP (!) 178/60 (BP Location: Right arm, Cuff Size: Adult Regular)   Pulse 74   Ht 1.549 m (5' 1\")   Wt 47.7 kg (105 lb 1.6 oz)   BMI 19.86 kg/m      Diagnostic Testing:  EKG 10/2021 showed SR 68 bpm  LE US 9/2018 showed <50% LE stenosis bilaterally   Stress Echocardiogam 12/2011 showed no stress-induced wall motion  Echo 8/2007 showed normal EF 55-60% and no significant valvular abnls      Plan:  1. CBC, TSH/T4 reflex, CMP    Assessment/Plan:    1. HTN    BP here was high today, and at home is running about 10 mmHg higher     At home, BPs (when subtracting 10 mmHg for cuff accuracy) were 120-160s    PLAN:    Blood work today to check renal fxn    Will call her with results; expect to start amlodipine as Dr. Jackson suggested at last his last OV with her      2. Ectopy    Remains well controlled on atenolol; 1 year Rx sent in 10/2021 OV    PLAN:    Annual visit 10/2022 as previously ordered    BMP and TSH today      3. Fatigue    She looks pale today, and notes she's been more fatigued since 11/2021    Her family threw Maria Guadalupe and Karen " "a 60th wedding anniversary party a few weeks ago, and she thinks that might have fatigued her a bit    PLAN:    TSH/T4 reflex. CBC, CMP    Will call with results        Tiera Remy PA-C, MSPAS      Orders Placed This Encounter   Procedures     Comprehensive metabolic panel     TSH with free T4 reflex     CBC with platelets     No orders of the defined types were placed in this encounter.    There are no discontinued medications.      Encounter Diagnoses   Name Primary?     Essential hypertension with goal blood pressure less than 140/90      Premature beats Yes     Chronic fatigue        CURRENT MEDICATIONS:  Current Outpatient Medications   Medication Sig Dispense Refill     atenolol (TENORMIN) 50 MG tablet Take 1 tablet (50 mg) by mouth daily 90 tablet 3     Calcium Carbonate-Vitamin D (CALCIUM + D PO) Take  by mouth.       Cholecalciferol (VITAMIN D PO) Take  by mouth.         ALLERGIES     Allergies   Allergen Reactions     Sulfa Drugs Rash     Hctz      Cough, dehydration, CROSS-REACTION with sulfa allergy       Darvon [Propoxyphene Hcl] Other (See Comments)     Stiff neck           Review of Systems:  Skin:  Negative     Eyes:  Positive for glasses  ENT:  Negative    Respiratory:  Negative for dyspnea on exertion  Cardiovascular:  Negative for;chest pain;syncope or near-syncope;exercise intolerance;fatigue;lightheadedness;dizziness;palpitations Positive for;palpitations;fatigue  Gastroenterology: Negative melena;hematochezia  Genitourinary:  not assessed    Musculoskeletal:  Negative    Neurologic:  Negative    Psychiatric:  not assessed excessive stress  Heme/Lymph/Imm:  Negative    Endocrine:  Negative      Physical Exam:  Vitals: BP (!) 178/60 (BP Location: Right arm, Cuff Size: Adult Regular)   Pulse 74   Ht 1.549 m (5' 1\")   Wt 47.7 kg (105 lb 1.6 oz)   BMI 19.86 kg/m      Constitutional:  cooperative, alert and oriented, well developed, well nourished, in no acute distress        Skin:  warm and dry " to the touch   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)    Head:  normocephalic, no masses or lesions        Eyes:  pupils equal and round;conjunctivae and lids unremarkable;sclera white        ENT:  no pallor or cyanosis, dentition good        Neck:  JVP normal;no carotid bruit        Chest:  normal breath sounds, clear to auscultation, normal A-P diameter, normal symmetry, normal respiratory excursion, no use of accessory muscles        Cardiac: regular rhythm, normal S1/S2, no S3 or S4, apical impulse not displaced, no murmurs, gallops or rubs                  Abdomen:  abdomen soft        Vascular: pulses full and equal                                      Extremities and Back:  no deformities, clubbing, cyanosis, erythema observed;no edema        Neurological:  no gross motor deficits            PAST MEDICAL HISTORY:  Past Medical History:   Diagnosis Date     Arm fracture, right 12/11     Essential hypertension, benign      Hypertension      PAC (premature atrial contraction)      Pneumonia 1988     PVC (premature ventricular contraction)      Recurrent Otitis media        PAST SURGICAL HISTORY:  Past Surgical History:   Procedure Laterality Date     OPEN REDUCTION INTERNAL FIXATION HUMERUS PROXIMAL  12/27/2011    Procedure:OPEN REDUCTION INTERNAL FIXATION HUMERUS PROXIMAL; OPEN REDUCTION INTERNAL FIXATION RIGHT PROXIMAL HUMERUS ; Surgeon:OCTAVIO WRIGHT; Location:[facility] OR     TONSILLECTOMY  as child       FAMILY HISTORY:  Family History   Problem Relation Age of Onset     Cerebrovascular Disease Mother         age 83     Cerebrovascular Disease Father         age  82       SOCIAL HISTORY:  Social History     Socioeconomic History     Marital status:      Spouse name: None     Number of children: None     Years of education: None     Highest education level: None   Occupational History     Occupation: retired   Tobacco Use     Smoking status: Never Smoker     Smokeless tobacco: Never Used   Substance  and Sexual Activity     Alcohol use: No     Drug use: No     Sexual activity: Not Currently     Partners: Male   Other Topics Concern     Parent/sibling w/ CABG, MI or angioplasty before 65F 55M? Not Asked   Social History Narrative     None     Social Determinants of Health     Financial Resource Strain: Not on file   Food Insecurity: Not on file   Transportation Needs: Not on file   Physical Activity: Not on file   Stress: Not on file   Social Connections: Not on file   Intimate Partner Violence: Not on file   Housing Stability: Not on file

## 2021-12-02 ENCOUNTER — DOCUMENTATION ONLY (OUTPATIENT)
Dept: CARDIOLOGY | Facility: CLINIC | Age: 81
End: 2021-12-02

## 2021-12-02 ENCOUNTER — LAB (OUTPATIENT)
Dept: LAB | Facility: CLINIC | Age: 81
End: 2021-12-02
Payer: COMMERCIAL

## 2021-12-02 ENCOUNTER — OFFICE VISIT (OUTPATIENT)
Dept: CARDIOLOGY | Facility: CLINIC | Age: 81
End: 2021-12-02
Payer: COMMERCIAL

## 2021-12-02 VITALS
HEART RATE: 74 BPM | SYSTOLIC BLOOD PRESSURE: 178 MMHG | BODY MASS INDEX: 19.84 KG/M2 | HEIGHT: 61 IN | WEIGHT: 105.1 LBS | DIASTOLIC BLOOD PRESSURE: 60 MMHG

## 2021-12-02 DIAGNOSIS — I49.49 PREMATURE BEATS: ICD-10-CM

## 2021-12-02 DIAGNOSIS — I10 ESSENTIAL HYPERTENSION WITH GOAL BLOOD PRESSURE LESS THAN 140/90: ICD-10-CM

## 2021-12-02 DIAGNOSIS — R53.82 CHRONIC FATIGUE: ICD-10-CM

## 2021-12-02 DIAGNOSIS — I10 ESSENTIAL HYPERTENSION WITH GOAL BLOOD PRESSURE LESS THAN 140/90: Primary | ICD-10-CM

## 2021-12-02 DIAGNOSIS — I49.49 PREMATURE BEATS: Primary | ICD-10-CM

## 2021-12-02 LAB
ALBUMIN SERPL-MCNC: 3.5 G/DL (ref 3.4–5)
ALP SERPL-CCNC: 69 U/L (ref 40–150)
ALT SERPL W P-5'-P-CCNC: 29 U/L (ref 0–50)
ANION GAP SERPL CALCULATED.3IONS-SCNC: 6 MMOL/L (ref 3–14)
AST SERPL W P-5'-P-CCNC: 24 U/L (ref 0–45)
BILIRUB SERPL-MCNC: 0.3 MG/DL (ref 0.2–1.3)
BUN SERPL-MCNC: 24 MG/DL (ref 7–30)
CALCIUM SERPL-MCNC: 9.2 MG/DL (ref 8.5–10.1)
CHLORIDE BLD-SCNC: 110 MMOL/L (ref 94–109)
CO2 SERPL-SCNC: 27 MMOL/L (ref 20–32)
CREAT SERPL-MCNC: 0.83 MG/DL (ref 0.52–1.04)
ERYTHROCYTE [DISTWIDTH] IN BLOOD BY AUTOMATED COUNT: 12.2 % (ref 10–15)
GFR SERPL CREATININE-BSD FRML MDRD: 67 ML/MIN/1.73M2
GLUCOSE BLD-MCNC: 85 MG/DL (ref 70–99)
HCT VFR BLD AUTO: 41.6 % (ref 35–47)
HGB BLD-MCNC: 13.1 G/DL (ref 11.7–15.7)
MCH RBC QN AUTO: 28.8 PG (ref 26.5–33)
MCHC RBC AUTO-ENTMCNC: 31.5 G/DL (ref 31.5–36.5)
MCV RBC AUTO: 91 FL (ref 78–100)
PLATELET # BLD AUTO: 218 10E3/UL (ref 150–450)
POTASSIUM BLD-SCNC: 4.4 MMOL/L (ref 3.4–5.3)
PROT SERPL-MCNC: 6.5 G/DL (ref 6.8–8.8)
RBC # BLD AUTO: 4.55 10E6/UL (ref 3.8–5.2)
SODIUM SERPL-SCNC: 143 MMOL/L (ref 133–144)
TSH SERPL DL<=0.005 MIU/L-ACNC: 0.99 MU/L (ref 0.4–4)
WBC # BLD AUTO: 10.1 10E3/UL (ref 4–11)

## 2021-12-02 PROCEDURE — 99214 OFFICE O/P EST MOD 30 MIN: CPT | Performed by: PHYSICIAN ASSISTANT

## 2021-12-02 PROCEDURE — 36415 COLL VENOUS BLD VENIPUNCTURE: CPT | Performed by: PHYSICIAN ASSISTANT

## 2021-12-02 PROCEDURE — 85027 COMPLETE CBC AUTOMATED: CPT | Performed by: PHYSICIAN ASSISTANT

## 2021-12-02 PROCEDURE — 80053 COMPREHEN METABOLIC PANEL: CPT | Performed by: PHYSICIAN ASSISTANT

## 2021-12-02 PROCEDURE — 84443 ASSAY THYROID STIM HORMONE: CPT | Performed by: PHYSICIAN ASSISTANT

## 2021-12-02 RX ORDER — AMLODIPINE BESYLATE 10 MG/1
5 TABLET ORAL DAILY
Start: 2021-12-02 | End: 2022-09-29

## 2021-12-02 ASSESSMENT — MIFFLIN-ST. JEOR: SCORE: 884.11

## 2021-12-02 NOTE — PROGRESS NOTES
Called Karen and let her know that blood work looked really good.  Reviewed that her BPs in clinic had been high. Dr. Jackson had previously Rx'd amlodipine 10 mg daily and she picked it up but never took it.    ** Will start amlodipine but just at 5 mg daily (1/2 of the current 10 mg tabs)  See me 2 m - transferred to scheduling    Component      Latest Ref Rng & Units 12/2/2021   WBC      4.0 - 11.0 10e3/uL 10.1   RBC Count      3.80 - 5.20 10e6/uL 4.55   Hemoglobin      11.7 - 15.7 g/dL 13.1   Hematocrit      35.0 - 47.0 % 41.6   MCV      78 - 100 fL 91   MCH      26.5 - 33.0 pg 28.8   MCHC      31.5 - 36.5 g/dL 31.5   RDW      10.0 - 15.0 % 12.2   Platelet Count      150 - 450 10e3/uL 218     Component      Latest Ref Rng & Units 12/2/2021   TSH      0.40 - 4.00 mU/L 0.99     Component      Latest Ref Rng & Units 12/2/2021   Sodium      133 - 144 mmol/L 143   Potassium      3.4 - 5.3 mmol/L 4.4   Chloride      94 - 109 mmol/L 110 (H)   Carbon Dioxide      20 - 32 mmol/L 27   Anion Gap      3 - 14 mmol/L 6   Urea Nitrogen      7 - 30 mg/dL 24   Creatinine      0.52 - 1.04 mg/dL 0.83   Calcium      8.5 - 10.1 mg/dL 9.2   Glucose      70 - 99 mg/dL 85     Component      Latest Ref Rng & Units 12/2/2021   Alkaline Phosphatase      40 - 150 U/L 69   AST      0 - 45 U/L 24   ALT      0 - 50 U/L 29   Protein Total      6.8 - 8.8 g/dL 6.5 (L)   Albumin      3.4 - 5.0 g/dL 3.5   Bilirubin Total      0.2 - 1.3 mg/dL 0.3   GFR Estimate      >60 mL/min/1.73m2 67

## 2021-12-02 NOTE — LETTER
"12/2/2021    Saurabh Garza PA-C  [address]  [place] [address]    RE: Karen SALINAS Daniel       Dear Colleague,    I had the pleasure of seeing Karen Sanchez in the [facility].  [facility]    I had the pleasure of seeing Karen when she came for follow up of BP concerns.  This 81 year old sees Dr. Jackson for her history of:    1. 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   2. Benign Essential HTN      I just saw Karen 10/2021 at which time she was doing well on atenolol. She had not started the amlodipine recommended by Dr. Jackson as BPs at home are running 120s-160s, with majority 120-130s.  As her BP was very high at the office, I recommended that she come back in a month and see me to check the accuracy of her BP cuff.    Interval History:  Karne feels \"tired\" today, noting she's felt more fatigued since 11/2021. No trouble sleeping and feels rested when she wakes up.     Otherwise, she feels like she's doing well - no CP, SOB. No dizziness, lightheadedness or syncope. Palpitations remain under good control. No edema/orthonpea.    VITALS:  Vitals: BP (!) 178/60 (BP Location: Right arm, Cuff Size: Adult Regular)   Pulse 74   Ht 1.549 m (5' 1\")   Wt 47.7 kg (105 lb 1.6 oz)   BMI 19.86 kg/m      Diagnostic Testing:  EKG 10/2021 showed SR 68 bpm  LE US 9/2018 showed <50% LE stenosis bilaterally   Stress Echocardiogam 12/2011 showed no stress-induced wall motion  Echo 8/2007 showed normal EF 55-60% and no significant valvular abnls      Plan:  1. CBC, TSH/T4 reflex, CMP    Assessment/Plan:    1. HTN    BP here was high today, and at home is running about 10 mmHg higher     At home, BPs (when subtracting 10 mmHg for cuff accuracy) were 120-160s    PLAN:    Blood work today to check renal fxn    Will call her with results; expect to start amlodipine as Dr. Jackson suggested at last his last OV with her      2. Ectopy    Remains " well controlled on atenolol; 1 year Rx sent in 10/2021 OV    PLAN:    Annual visit 10/2022 as previously ordered    BMP and TSH today      3. Fatigue    She looks pale today, and notes she's been more fatigued since 11/2021    Her family threw Maria Guadalupe and Karen a 60th wedding anniversary party a few weeks ago, and she thinks that might have fatigued her a bit    PLAN:    TSH/T4 reflex. CBC, CMP    Will call with results      Tiera Remy PA-C, MSPAS    Orders Placed This Encounter   Procedures     Comprehensive metabolic panel     TSH with free T4 reflex     CBC with platelets     No orders of the defined types were placed in this encounter.    There are no discontinued medications.      Encounter Diagnoses   Name Primary?     Essential hypertension with goal blood pressure less than 140/90      Premature beats Yes     Chronic fatigue        CURRENT MEDICATIONS:  Current Outpatient Medications   Medication Sig Dispense Refill     atenolol (TENORMIN) 50 MG tablet Take 1 tablet (50 mg) by mouth daily 90 tablet 3     Calcium Carbonate-Vitamin D (CALCIUM + D PO) Take  by mouth.       Cholecalciferol (VITAMIN D PO) Take  by mouth.         ALLERGIES  Allergies   Allergen Reactions     Sulfa Drugs Rash     Hctz      Cough, dehydration, CROSS-REACTION with sulfa allergy       Darvon [Propoxyphene Hcl] Other (See Comments)     Stiff neck       Review of Systems:  Skin:  Negative     Eyes:  Positive for glasses  ENT:  Negative    Respiratory:  Negative for dyspnea on exertion  Cardiovascular:  Negative for;chest pain;syncope or near-syncope;exercise intolerance;fatigue;lightheadedness;dizziness;palpitations Positive for;palpitations;fatigue  Gastroenterology: Negative melena;hematochezia  Genitourinary:  not assessed    Musculoskeletal:  Negative    Neurologic:  Negative    Psychiatric:  not assessed excessive stress  Heme/Lymph/Imm:  Negative    Endocrine:  Negative      Physical Exam:  Vitals: BP (!) 178/60 (BP Location: Right  "arm, Cuff Size: Adult Regular)   Pulse 74   Ht 1.549 m (5' 1\")   Wt 47.7 kg (105 lb 1.6 oz)   BMI 19.86 kg/m      Constitutional:  cooperative, alert and oriented, well developed, well nourished, in no acute distress        Skin:  warm and dry to the touch   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)    Head:  normocephalic, no masses or lesions        Eyes:  pupils equal and round;conjunctivae and lids unremarkable;sclera white        ENT:  no pallor or cyanosis, dentition good        Neck:  JVP normal;no carotid bruit        Chest:  normal breath sounds, clear to auscultation, normal A-P diameter, normal symmetry, normal respiratory excursion, no use of accessory muscles        Cardiac: regular rhythm, normal S1/S2, no S3 or S4, apical impulse not displaced, no murmurs, gallops or rubs                  Abdomen:  abdomen soft        Vascular: pulses full and equal                                      Extremities and Back:  no deformities, clubbing, cyanosis, erythema observed;no edema        Neurological:  no gross motor deficits            PAST MEDICAL HISTORY:  Past Medical History:   Diagnosis Date     Arm fracture, right 12/11     Essential hypertension, benign      Hypertension      PAC (premature atrial contraction)      Pneumonia 1988     PVC (premature ventricular contraction)      Recurrent Otitis media        PAST SURGICAL HISTORY:  Past Surgical History:   Procedure Laterality Date     OPEN REDUCTION INTERNAL FIXATION HUMERUS PROXIMAL  12/27/2011    Procedure:OPEN REDUCTION INTERNAL FIXATION HUMERUS PROXIMAL; OPEN REDUCTION INTERNAL FIXATION RIGHT PROXIMAL HUMERUS ; Surgeon:OCTAVIO WRIGHT; Location:[facility] OR     TONSILLECTOMY  as child       FAMILY HISTORY:  Family History   Problem Relation Age of Onset     Cerebrovascular Disease Mother         age 83     Cerebrovascular Disease Father         age  82       SOCIAL HISTORY:  Social History     Socioeconomic History     Marital status:      " Spouse name: None     Number of children: None     Years of education: None     Highest education level: None   Occupational History     Occupation: retired   Tobacco Use     Smoking status: Never Smoker     Smokeless tobacco: Never Used   Substance and Sexual Activity     Alcohol use: No     Drug use: No     Sexual activity: Not Currently     Partners: Male   Other Topics Concern     Parent/sibling w/ CABG, MI or angioplasty before 65F 55M? Not Asked   Social History Narrative     None     Social Determinants of Health     Financial Resource Strain: Not on file   Food Insecurity: Not on file   Transportation Needs: Not on file   Physical Activity: Not on file   Stress: Not on file   Social Connections: Not on file   Intimate Partner Violence: Not on file   Housing Stability: Not on file       Saundra Remy PA-C   [facility]

## 2021-12-02 NOTE — PATIENT INSTRUCTIONS
Karen - it was good to see you today!    1. Reviewed BP with your cuff - it looks like your cuff runs ~10 points higher than ours did!  2.    PLAN  1. Blood work today (thyroid, liver, kidneys, electrolytes, blood counts)  2. I think we might need to start a BP med but will look at the labs first    [phone]

## 2022-02-24 NOTE — PROGRESS NOTES
"[facility]    I had the pleasure of seeing Karen when she came for follow up of HTn.  This 81 year old sees Dr. Jackson for her history of:    1. 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   2. Benign Essential HTN      I saw Karen 12/2021 and she noted she'd been more fatigued since 11/2021 but otherwise was doing well. SBP 170s at that visit. BPs on her home cuff were 120-160s and I rec'd she start amlodipine 5 mg daily (had 10 mg tabs previously Rx'd by Dr. Jackson, though Karen had never started). 2 m follow-up rec'd to assess BP.    Interval History:  Unfortunately, Karen's  Maria Guadalupe passed away in January of this year d/t what sounds like kidney failure. She has good support in her family and residents at her Independent [facility]. She remains grateful that they were able to celebrate their 60th wedding anniversary with a large party Nov 2021 (anniversary was 12/15).      With all of this, she has not taken her BP at home, nor did she start the amlodipine 5 mg daily I had rec'd.  She was concerned it may cause a reaction with her Sulfa allergy. She had this with hydrochlorothiazide, and I offered to review this with the Pharmacy given her concerns (though I've not heard this with amlodipine before).    No c/o CP, SOB, edema, headache.     She's leaving for [place] on Wednesday x 1 week to celebrate her granddaughter's wedding.    VITALS:  Vitals: BP (!) 160/60   Pulse 75   Ht 1.549 m (5' 1\")   Wt 44.9 kg (99 lb)   SpO2 98%   BMI 18.71 kg/m      Diagnostic Testing:  EKG 10/2021 showed SR 68 bpm  LE US 9/2018 showed <50% LE stenosis bilaterally   Stress Echocardiogam 12/2011 showed no stress-induced wall motion  Echo 8/2007 showed normal EF 55-60% and no significant valvular abnls   12/2/2021   WBC      4.0 - 11.0 10e3/uL 10.1   RBC Count      3.80 - 5.20 10e6/uL 4.55   Hemoglobin      11.7 - 15.7 g/dL 13.1   Hematocrit      35.0 - 47.0 % 41.6   MCV      78 - 100 fL 91   MCH      26.5 - 33.0 pg 28.8 " "  MCHC      31.5 - 36.5 g/dL 31.5   RDW      10.0 - 15.0 % 12.2   Platelet Count      150 - 450 10e3/uL 218      Component      Latest Ref Rng & Units 12/2/2021   TSH      0.40 - 4.00 mU/L 0.99      Component      Latest Ref Rng & Units 12/2/2021   Sodium      133 - 144 mmol/L 143   Potassium      3.4 - 5.3 mmol/L 4.4   Chloride      94 - 109 mmol/L 110 (H)   Carbon Dioxide      20 - 32 mmol/L 27   Anion Gap      3 - 14 mmol/L 6   Urea Nitrogen      7 - 30 mg/dL 24   Creatinine      0.52 - 1.04 mg/dL 0.83   Calcium      8.5 - 10.1 mg/dL 9.2   Glucose      70 - 99 mg/dL 85      Component      Latest Ref Rng & Units 12/2/2021   Alkaline Phosphatase      40 - 150 U/L 69   AST      0 - 45 U/L 24   ALT      0 - 50 U/L 29   Protein Total      6.8 - 8.8 g/dL 6.5 (L)   Albumin      3.4 - 5.0 g/dL 3.5   Bilirubin Total      0.2 - 1.3 mg/dL 0.3   GFR Estimate      >60 mL/min/1.73m2 67           Plan:  Will review for any potential amlodipine/Sulfa interaction    Assessment/Plan:    1. HTN    She did not start the amlodipine 5 mg daily in 12/2021 given her 's illness and subsequent death as \"didn't want to have any problem\" while managing this    PLAN:    I offered to call Pharmacy to confirm no possible interaction. Can call her (after she returns 3/8) to review    2. Ectopy    Remains well controlled on atenolol    TSH 12/2021 was wnl    PLAN:    Continue to monitor; continue atenolol      Tiera Remy PA-C, MSPAS      No orders of the defined types were placed in this encounter.    No orders of the defined types were placed in this encounter.    There are no discontinued medications.      Encounter Diagnosis   Name Primary?     Essential hypertension with goal blood pressure less than 140/90        CURRENT MEDICATIONS:  Current Outpatient Medications   Medication Sig Dispense Refill     atenolol (TENORMIN) 50 MG tablet Take 1 tablet (50 mg) by mouth daily 90 tablet 3     Calcium Carbonate-Vitamin D (CALCIUM + D PO) " "Take  by mouth.       Cholecalciferol (VITAMIN D PO) Take  by mouth.       amLODIPine (NORVASC) 10 MG tablet Take 0.5 tablets (5 mg) by mouth daily (Patient not taking: Reported on 2/28/2022)         ALLERGIES     Allergies   Allergen Reactions     Sulfa Drugs Rash     Hctz      Cough, dehydration, CROSS-REACTION with sulfa allergy       Darvon [Propoxyphene Hcl] Other (See Comments)     Stiff neck           Review of Systems:  Skin:  Negative     Eyes:  Positive for glasses  ENT:  Negative    Respiratory:  Negative for dyspnea on exertion  Cardiovascular:  Negative for;chest pain;syncope or near-syncope;exercise intolerance;fatigue;lightheadedness;dizziness;palpitations Positive for;fatigue  Gastroenterology: Negative melena;hematochezia  Genitourinary:  not assessed    Musculoskeletal:  Negative    Neurologic:  Negative    Psychiatric:  not assessed excessive stress  Heme/Lymph/Imm:  Negative    Endocrine:  Negative      Physical Exam:  Vitals: BP (!) 160/60   Pulse 75   Ht 1.549 m (5' 1\")   Wt 44.9 kg (99 lb)   SpO2 98%   BMI 18.71 kg/m      Constitutional:  cooperative, alert and oriented, well developed, well nourished, in no acute distress        Skin:  warm and dry to the touch   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)    Head:  normocephalic, no masses or lesions        Eyes:  pupils equal and round;conjunctivae and lids unremarkable;sclera white        ENT:  no pallor or cyanosis, dentition good        Neck:  JVP normal;no carotid bruit        Chest:  normal breath sounds, clear to auscultation, normal A-P diameter, normal symmetry, normal respiratory excursion, no use of accessory muscles        Cardiac: regular rhythm, normal S1/S2, no S3 or S4, apical impulse not displaced, no murmurs, gallops or rubs                  Abdomen:  abdomen soft        Vascular: pulses full and equal                                      Extremities and Back:  no deformities, clubbing, cyanosis, erythema observed;no " edema        Neurological:  no gross motor deficits            PAST MEDICAL HISTORY:  Past Medical History:   Diagnosis Date     Arm fracture, right 12/11     Essential hypertension, benign      Hypertension      PAC (premature atrial contraction)      Pneumonia 1988     PVC (premature ventricular contraction)      Recurrent Otitis media        PAST SURGICAL HISTORY:  Past Surgical History:   Procedure Laterality Date     OPEN REDUCTION INTERNAL FIXATION HUMERUS PROXIMAL  12/27/2011    Procedure:OPEN REDUCTION INTERNAL FIXATION HUMERUS PROXIMAL; OPEN REDUCTION INTERNAL FIXATION RIGHT PROXIMAL HUMERUS ; Surgeon:OCTAVIO WRIGHT; Location:[facility] OR     TONSILLECTOMY  as child       FAMILY HISTORY:  Family History   Problem Relation Age of Onset     Cerebrovascular Disease Mother         age 83     Cerebrovascular Disease Father         age  82       SOCIAL HISTORY:  Social History     Socioeconomic History     Marital status:      Spouse name: None     Number of children: None     Years of education: None     Highest education level: None   Occupational History     Occupation: retired   Tobacco Use     Smoking status: Never Smoker     Smokeless tobacco: Never Used   Substance and Sexual Activity     Alcohol use: No     Drug use: No     Sexual activity: Not Currently     Partners: Male   Other Topics Concern     Parent/sibling w/ CABG, MI or angioplasty before 65F 55M? Not Asked   Social History Narrative     None     Social Determinants of Health     Financial Resource Strain: Not on file   Food Insecurity: Not on file   Transportation Needs: Not on file   Physical Activity: Not on file   Stress: Not on file   Social Connections: Not on file   Intimate Partner Violence: Not on file   Housing Stability: Not on file

## 2022-02-28 ENCOUNTER — OFFICE VISIT (OUTPATIENT)
Dept: CARDIOLOGY | Facility: CLINIC | Age: 82
End: 2022-02-28
Payer: COMMERCIAL

## 2022-02-28 VITALS
WEIGHT: 99 LBS | HEART RATE: 75 BPM | OXYGEN SATURATION: 98 % | HEIGHT: 61 IN | SYSTOLIC BLOOD PRESSURE: 160 MMHG | BODY MASS INDEX: 18.69 KG/M2 | DIASTOLIC BLOOD PRESSURE: 60 MMHG

## 2022-02-28 DIAGNOSIS — I10 ESSENTIAL HYPERTENSION WITH GOAL BLOOD PRESSURE LESS THAN 140/90: ICD-10-CM

## 2022-02-28 PROCEDURE — 99214 OFFICE O/P EST MOD 30 MIN: CPT | Performed by: PHYSICIAN ASSISTANT

## 2022-02-28 NOTE — PATIENT INSTRUCTIONS
Karen -  It was so nice to see you today.    1. Reviewed your sad January with Maria Guadalupe's death.    2. Noted that you've not started the amlodipine due to concerns of any possible Sulfa allergy    PLAN:  1. I'll call the pharmacy to find out about any concern for sulfa and amlodipine (I don't think there is one but will check with experts!)

## 2022-02-28 NOTE — LETTER
"2/28/2022    Saurabh Garza PA-C  [address]  [place] [address]    RE: Karen E Daniel       Dear Colleague,     I had the pleasure of seeing Karen Sanchez in the [facility].  [facility]    I had the pleasure of seeing Karen when she came for follow up of HTn.  This 81 year old sees Dr. Jackson for her history of:    1. 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   2. Benign Essential HTN      I saw Karen 12/2021 and she noted she'd been more fatigued since 11/2021 but otherwise was doing well. SBP 170s at that visit. BPs on her home cuff were 120-160s and I rec'd she start amlodipine 5 mg daily (had 10 mg tabs previously Rx'd by Dr. Jackson, though Karen had never started). 2 m follow-up rec'd to assess BP.    Interval History:  Unfortunately, Karen's  Maria Guadalupe passed away in January of this year d/t what sounds like kidney failure. She has good support in her family and residents at her Independent [facility]. She remains grateful that they were able to celebrate their 60th wedding anniversary with a large party Nov 2021 (anniversary was 12/15).      With all of this, she has not taken her BP at home, nor did she start the amlodipine 5 mg daily I had rec'd.  She was concerned it may cause a reaction with her Sulfa allergy. She had this with hydrochlorothiazide, and I offered to review this with the Pharmacy given her concerns (though I've not heard this with amlodipine before).    No c/o CP, SOB, edema, headache.     She's leaving for [place] on Wednesday x 1 week to celebrate her granddaughter's wedding.    VITALS:  Vitals: BP (!) 160/60   Pulse 75   Ht 1.549 m (5' 1\")   Wt 44.9 kg (99 lb)   SpO2 98%   BMI 18.71 kg/m      Diagnostic Testing:  EKG 10/2021 showed SR 68 bpm  LE US 9/2018 showed <50% LE stenosis bilaterally   Stress Echocardiogam 12/2011 showed no stress-induced wall motion  Echo 8/2007 showed normal EF 55-60% and no significant valvular abnls   " "12/2/2021   WBC      4.0 - 11.0 10e3/uL 10.1   RBC Count      3.80 - 5.20 10e6/uL 4.55   Hemoglobin      11.7 - 15.7 g/dL 13.1   Hematocrit      35.0 - 47.0 % 41.6   MCV      78 - 100 fL 91   MCH      26.5 - 33.0 pg 28.8   MCHC      31.5 - 36.5 g/dL 31.5   RDW      10.0 - 15.0 % 12.2   Platelet Count      150 - 450 10e3/uL 218      Component      Latest Ref Rng & Units 12/2/2021   TSH      0.40 - 4.00 mU/L 0.99      Component      Latest Ref Rng & Units 12/2/2021   Sodium      133 - 144 mmol/L 143   Potassium      3.4 - 5.3 mmol/L 4.4   Chloride      94 - 109 mmol/L 110 (H)   Carbon Dioxide      20 - 32 mmol/L 27   Anion Gap      3 - 14 mmol/L 6   Urea Nitrogen      7 - 30 mg/dL 24   Creatinine      0.52 - 1.04 mg/dL 0.83   Calcium      8.5 - 10.1 mg/dL 9.2   Glucose      70 - 99 mg/dL 85      Component      Latest Ref Rng & Units 12/2/2021   Alkaline Phosphatase      40 - 150 U/L 69   AST      0 - 45 U/L 24   ALT      0 - 50 U/L 29   Protein Total      6.8 - 8.8 g/dL 6.5 (L)   Albumin      3.4 - 5.0 g/dL 3.5   Bilirubin Total      0.2 - 1.3 mg/dL 0.3   GFR Estimate      >60 mL/min/1.73m2 67           Plan:  Will review for any potential amlodipine/Sulfa interaction    Assessment/Plan:    1. HTN    She did not start the amlodipine 5 mg daily in 12/2021 given her 's illness and subsequent death as \"didn't want to have any problem\" while managing this    PLAN:    I offered to call Pharmacy to confirm no possible interaction. Can call her (after she returns 3/8) to review    2. Ectopy    Remains well controlled on atenolol    TSH 12/2021 was wnl    PLAN:    Continue to monitor; continue atenolol      Tiera Remy PA-C, MSPAS      No orders of the defined types were placed in this encounter.    No orders of the defined types were placed in this encounter.    There are no discontinued medications.      Encounter Diagnosis   Name Primary?     Essential hypertension with goal blood pressure less than 140/90  " "      CURRENT MEDICATIONS:  Current Outpatient Medications   Medication Sig Dispense Refill     atenolol (TENORMIN) 50 MG tablet Take 1 tablet (50 mg) by mouth daily 90 tablet 3     Calcium Carbonate-Vitamin D (CALCIUM + D PO) Take  by mouth.       Cholecalciferol (VITAMIN D PO) Take  by mouth.       amLODIPine (NORVASC) 10 MG tablet Take 0.5 tablets (5 mg) by mouth daily (Patient not taking: Reported on 2/28/2022)         ALLERGIES     Allergies   Allergen Reactions     Sulfa Drugs Rash     Hctz      Cough, dehydration, CROSS-REACTION with sulfa allergy       Darvon [Propoxyphene Hcl] Other (See Comments)     Stiff neck           Review of Systems:  Skin:  Negative     Eyes:  Positive for glasses  ENT:  Negative    Respiratory:  Negative for dyspnea on exertion  Cardiovascular:  Negative for;chest pain;syncope or near-syncope;exercise intolerance;fatigue;lightheadedness;dizziness;palpitations Positive for;fatigue  Gastroenterology: Negative melena;hematochezia  Genitourinary:  not assessed    Musculoskeletal:  Negative    Neurologic:  Negative    Psychiatric:  not assessed excessive stress  Heme/Lymph/Imm:  Negative    Endocrine:  Negative      Physical Exam:  Vitals: BP (!) 160/60   Pulse 75   Ht 1.549 m (5' 1\")   Wt 44.9 kg (99 lb)   SpO2 98%   BMI 18.71 kg/m      Constitutional:  cooperative, alert and oriented, well developed, well nourished, in no acute distress        Skin:  warm and dry to the touch   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)    Head:  normocephalic, no masses or lesions        Eyes:  pupils equal and round;conjunctivae and lids unremarkable;sclera white        ENT:  no pallor or cyanosis, dentition good        Neck:  JVP normal;no carotid bruit        Chest:  normal breath sounds, clear to auscultation, normal A-P diameter, normal symmetry, normal respiratory excursion, no use of accessory muscles        Cardiac: regular rhythm, normal S1/S2, no S3 or S4, apical impulse not displaced, " no murmurs, gallops or rubs                  Abdomen:  abdomen soft        Vascular: pulses full and equal                                      Extremities and Back:  no deformities, clubbing, cyanosis, erythema observed;no edema        Neurological:  no gross motor deficits            PAST MEDICAL HISTORY:  Past Medical History:   Diagnosis Date     Arm fracture, right 12/11     Essential hypertension, benign      Hypertension      PAC (premature atrial contraction)      Pneumonia 1988     PVC (premature ventricular contraction)      Recurrent Otitis media        PAST SURGICAL HISTORY:  Past Surgical History:   Procedure Laterality Date     OPEN REDUCTION INTERNAL FIXATION HUMERUS PROXIMAL  12/27/2011    Procedure:OPEN REDUCTION INTERNAL FIXATION HUMERUS PROXIMAL; OPEN REDUCTION INTERNAL FIXATION RIGHT PROXIMAL HUMERUS ; Surgeon:OCTAVIO WRIGHT; Location:[facility] OR     TONSILLECTOMY  as child       FAMILY HISTORY:  Family History   Problem Relation Age of Onset     Cerebrovascular Disease Mother         age 83     Cerebrovascular Disease Father         age  82       SOCIAL HISTORY:  Social History     Socioeconomic History     Marital status:      Spouse name: None     Number of children: None     Years of education: None     Highest education level: None   Occupational History     Occupation: retired   Tobacco Use     Smoking status: Never Smoker     Smokeless tobacco: Never Used   Substance and Sexual Activity     Alcohol use: No     Drug use: No     Sexual activity: Not Currently     Partners: Male   Other Topics Concern     Parent/sibling w/ CABG, MI or angioplasty before 65F 55M? Not Asked   Social History Narrative     None     Social Determinants of Health     Financial Resource Strain: Not on file   Food Insecurity: Not on file   Transportation Needs: Not on file   Physical Activity: Not on file   Stress: Not on file   Social Connections: Not on file   Intimate Partner Violence: Not on file   Housing  Stability: Not on file           Thank you for allowing me to participate in the care of your patient.      Sincerely,     Saundra Remy PA-C     [facility]  cc:   Referred Self, MD  No address on file

## 2022-03-29 ENCOUNTER — DOCUMENTATION ONLY (OUTPATIENT)
Dept: CARDIOLOGY | Facility: CLINIC | Age: 82
End: 2022-03-29
Payer: COMMERCIAL

## 2022-06-22 ENCOUNTER — NURSE TRIAGE (OUTPATIENT)
Dept: NURSING | Facility: CLINIC | Age: 82
End: 2022-06-22

## 2022-06-22 NOTE — TELEPHONE ENCOUNTER
Nurse Triage SBAR    Is this a 2nd Level Triage? NO    Situation: Patient calling with cat scratch.  Consent: not needed    Scratched by cat, 6/21/22  Broke the skin on right forearm   Width of a pencil  Last tetanus shot: about ten years ago  Denies fever, pus, redness    Protocol Recommended Disposition:   See Physician Within 24 Hours    Recommendation: Advised patient to make an appointment. Transferred to scheduling. . Reviewed concerning symptoms and when to call back.         Does the patient meet one of the following criteria for ADS visit consideration? 16+ years old, with an [facility] PCP         Rhonda Nuñez RN [facility] 6/22/2022 5:33 PM    Reason for Disposition    [1] Last tetanus shot > 5 years ago AND [2] any wound (e.g., cut, scrape)    Additional Information    Negative: [1] Major bleeding (e.g., actively dripping or spurting) AND [2] can't be stopped    Negative: [1] Any break in skin from BITE (e.g., cut, puncture or scratch) AND[2] WILD animal at risk for RABIES (e.g., bat, raccoon, darling, skunk, coyote, other carnivores)    Negative: [1] Any break in skin from BITE (e.g., cut, puncture or scratch) AND[2] PET animial (e.g., dog, cat, or ferret) at risk for RABIES (e.g., sick, stray, unprovoked bite, developing country)    Negative: [1] Any break in skin from BITE (e.g., cut, puncture or scratch) AND[2] monkey    Negative: [1] EXPOSURE of non-intact skin (e.g., exposed person has dermatitis, abrasion, wound) AND[2] with animal BODY FLUID (e.g., saliva such as licking, blood, brain) AND[3] animal at high-risk for RABIES (e.g., bat, raccoon, darling, skunk, coyote, other carnivores)    Negative: [1] Cut (length > 1/8 inch or 3 mm) or skin tear AND [2] any animal    Negative: [1] Bleeding AND [2] won't stop after 10 minutes of direct pressure (using correct technique)    Negative: Description of bite sounds severe to the triager    Negative: [1] Puncture wound (hole through the skin) AND [2]  from a cat bite (or deep claw puncture wound)    Negative: [1] Puncture wound or small cut AND [2] on face    Negative: [1] Puncture wound or small cut AND [2] on hands or genitals    Negative: [1] Puncture wound or small cut AND [2] weak immune system (e.g., HIV positive, cancer chemo, splenectomy, organ transplant, chronic steroids)    Negative: Looks infected (red area, red streak, or pus)    Negative: [1] No bite quinn or scratch AND [2] suspected bat exposure (e.g., bat found in same room as sleeping adult)    Negative: [1] Taking antibiotic > 24 hours for infected bite AND [2] bite getting worse (more swollen, more redness and increased pain)    Negative: [1] Taking antibiotic > 48 hours (2 days) for infected bite AND [2] fever persists    Negative: [1] Taking antibiotic > 72 hours (3 days) for infected bite AND [2] has not improved (i.e., pain, pus, redness)    Negative: [1] No prior tetanus shots (or is not fully vaccinated) AND [2] any wound (e.g., cut, scrape)    Protocols used: ANIMAL BITE-A-

## 2022-06-22 NOTE — TELEPHONE ENCOUNTER
TELEPHONE CALL -    Reason for call   2n call  Today    She tameka talked to [facility] already   Pt was advised to make an appt for immunization - but her next available appt is in 2 weeks, Due to a cat scratch   Last tetanus shot  Was more than 10 years ago  Saurabh Garza PA-C - [facility]  Please call her at BOTH numbers   Her phone # [phone] and Cell phone # [phone]    Protocol Recommended Disposition: See Physician Within 24 Hours  Informed caller that RN will send a note to PCP/Provider s Care Team   Natividad Sargent RN [facility],  6:43 PM 6/22/2022

## 2022-06-23 ENCOUNTER — ALLIED HEALTH/NURSE VISIT (OUTPATIENT)
Dept: FAMILY MEDICINE | Facility: CLINIC | Age: 82
End: 2022-06-23
Payer: COMMERCIAL

## 2022-06-23 DIAGNOSIS — Z13.220 SCREENING FOR HYPERLIPIDEMIA: Primary | ICD-10-CM

## 2022-06-23 PROCEDURE — 99207 PR NO CHARGE NURSE ONLY: CPT

## 2022-06-23 PROCEDURE — 90715 TDAP VACCINE 7 YRS/> IM: CPT

## 2022-06-23 PROCEDURE — 90471 IMMUNIZATION ADMIN: CPT

## 2022-06-23 NOTE — TELEPHONE ENCOUNTER
Appt scheduled for today at 1:30. Triage please close the encounter.Thank you.  Meagan Mathews,

## 2022-09-25 NOTE — PROGRESS NOTES
"[facility]    I had the pleasure of seeing Karen when she came for follow up of HTN and palpitations,.  This 82 year old sees Dr. Jackson for her history of:    1.  Symptomatic PACs, PVCs - previously seen on EKGs.  Have been well controlled on atenolol  3.  Benign essential hypertension      I saw Karen 2/2022 at which time she was grieving the loss of her  Maria Guadalupe, who had passed away 1/2022 d/t what sounds like kidney failure.  She had not started amlodipine as I had previously recommended d/t the stress she was under, but also was concerned that it could react with a sulfa allergy.  We reviewed this with pharmacy to ensure there would be no cross-reactivity.  When I contacted her to review this, she noted that BPs were much better in the 130s and she felt that her BPs have previously been higher d/t increased stress level.  Therefore, we had her continue current medications and recommended she see me 10/2022.    Interval History:  Karen is feeling \"better\" than when I saw her 2/2022. She's getting back to some exercises without any c/o CP, SOB. She is still grieving the loss of her , Maria Guadalupe, and notes she's not keeping her weight up angel well.    She notes mild L>R foot swelling but no pretibial edema. No orthopnea, PND. No LANIER/SOB. No CP.    No dizziness, lightheadedness. Gets up \"a little slowly\" but overall feels she's doing well. She's eager to visit her granddaughter in [place] in a few weeks.     BPs at home <140s over the last few months on atenolol. This also helps control her palpitations.    VITALS:  Vitals: BP (!) 148/64   Pulse 79   Ht 1.549 m (5' 1\")   Wt 45.4 kg (100 lb)   BMI 18.89 kg/m      Diagnostic Testing:  EKG today which I overread, showed SR 82 with nonspecific STTW changes  EKG 10/2021 showed SR 68 bpm  LE US 9/2018 showed <50% LE stenosis bilaterally   Stress Echocardiogam 12/2011 showed no stress-induced wall motion  Echo 8/2007 showed normal EF 55-60% and no " significant valvular abnls      Plan:  Echo - we'll contact with results  Annual follow-up     Assessment/Plan:    1. Hypertension    BPs at home really look good 110-140s, mostly 130s    Remains on atenolol 50 mg daily    BP high here but really quite good at home.    PLAN:    Continue to monitor BP at home    Continue atenolol    2. PVCs    Palpitations are under good control on atenolol    Last echo 2011 was wnl    PLAN:    Updated echo. We'll contact with results      Tiera Remy PA-C, MSPAS      Orders Placed This Encounter   Procedures     EKG 12-lead complete w/read - Clinics (performed today)     Echocardiogram Complete     Orders Placed This Encounter   Medications     atenolol (TENORMIN) 50 MG tablet     Sig: Take 1 tablet (50 mg) by mouth daily     Dispense:  90 tablet     Refill:  3     Medications Discontinued During This Encounter   Medication Reason     amLODIPine (NORVASC) 10 MG tablet Stopped by Patient     atenolol (TENORMIN) 50 MG tablet Reorder         Encounter Diagnosis   Name Primary?     Premature beats Yes       CURRENT MEDICATIONS:  Current Outpatient Medications   Medication Sig Dispense Refill     atenolol (TENORMIN) 50 MG tablet Take 1 tablet (50 mg) by mouth daily 90 tablet 3     Calcium Carbonate-Vitamin D (CALCIUM + D PO) Take  by mouth.       Cholecalciferol (VITAMIN D PO) Take  by mouth.         ALLERGIES     Allergies   Allergen Reactions     Sulfa Drugs Rash     Hctz      Cough, dehydration, CROSS-REACTION with sulfa allergy       Darvon [Propoxyphene Hcl] Other (See Comments)     Stiff neck           Review of Systems:  Skin:  Negative     Eyes:  Positive for glasses  ENT:  Negative    Respiratory:  Negative for dyspnea on exertion  Cardiovascular:  Negative for;chest pain;syncope or near-syncope;exercise intolerance;fatigue;lightheadedness;dizziness;palpitations    Gastroenterology: Negative melena;hematochezia  Genitourinary:  not assessed    Musculoskeletal:  Negative   "  Neurologic:  Negative    Psychiatric:  not assessed excessive stress  Heme/Lymph/Imm:  Negative    Endocrine:  Negative      Physical Exam:  Vitals: BP (!) 148/64   Pulse 79   Ht 1.549 m (5' 1\")   Wt 45.4 kg (100 lb)   BMI 18.89 kg/m      Constitutional:  cooperative, alert and oriented, well developed, well nourished, in no acute distress        Skin:  warm and dry to the touch   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)    Head:  normocephalic, no masses or lesions        Eyes:  pupils equal and round;conjunctivae and lids unremarkable;sclera white        ENT:  no pallor or cyanosis, dentition good        Neck:  JVP normal;no carotid bruit        Chest:  normal breath sounds, clear to auscultation, normal A-P diameter, normal symmetry, normal respiratory excursion, no use of accessory muscles        Cardiac: regular rhythm, normal S1/S2, no S3 or S4, apical impulse not displaced, no murmurs, gallops or rubs                  Abdomen:  abdomen soft        Vascular: pulses full and equal                                      Extremities and Back:  no deformities, clubbing, cyanosis, erythema observed;no edema        Neurological:  no gross motor deficits            PAST MEDICAL HISTORY:  Past Medical History:   Diagnosis Date     Arm fracture, right 12/11     Essential hypertension, benign      Hypertension      PAC (premature atrial contraction)      Pneumonia 1988     PVC (premature ventricular contraction)      Recurrent Otitis media        PAST SURGICAL HISTORY:  Past Surgical History:   Procedure Laterality Date     OPEN REDUCTION INTERNAL FIXATION HUMERUS PROXIMAL  12/27/2011    Procedure:OPEN REDUCTION INTERNAL FIXATION HUMERUS PROXIMAL; OPEN REDUCTION INTERNAL FIXATION RIGHT PROXIMAL HUMERUS ; Surgeon:OCTAVIO WRIGHT; Location:[facility] OR     TONSILLECTOMY  as child       FAMILY HISTORY:  Family History   Problem Relation Age of Onset     Cerebrovascular Disease Mother         age 83     Cerebrovascular " Disease Father         age  82       SOCIAL HISTORY:  Social History     Socioeconomic History     Marital status:      Spouse name: None     Number of children: None     Years of education: None     Highest education level: None   Occupational History     Occupation: retired   Tobacco Use     Smoking status: Never Smoker     Smokeless tobacco: Never Used   Substance and Sexual Activity     Alcohol use: No     Drug use: No     Sexual activity: Not Currently     Partners: Male

## 2022-09-29 ENCOUNTER — OFFICE VISIT (OUTPATIENT)
Dept: CARDIOLOGY | Facility: CLINIC | Age: 82
End: 2022-09-29
Attending: PHYSICIAN ASSISTANT
Payer: COMMERCIAL

## 2022-09-29 VITALS
BODY MASS INDEX: 18.88 KG/M2 | SYSTOLIC BLOOD PRESSURE: 148 MMHG | WEIGHT: 100 LBS | HEIGHT: 61 IN | HEART RATE: 79 BPM | DIASTOLIC BLOOD PRESSURE: 64 MMHG

## 2022-09-29 DIAGNOSIS — I49.49 PREMATURE BEATS: Primary | ICD-10-CM

## 2022-09-29 PROCEDURE — 99213 OFFICE O/P EST LOW 20 MIN: CPT | Performed by: PHYSICIAN ASSISTANT

## 2022-09-29 PROCEDURE — 93000 ELECTROCARDIOGRAM COMPLETE: CPT | Performed by: PHYSICIAN ASSISTANT

## 2022-09-29 RX ORDER — ATENOLOL 50 MG/1
50 TABLET ORAL DAILY
Qty: 90 TABLET | Refills: 3 | Status: SHIPPED | OUTPATIENT
Start: 2022-09-29 | End: 2023-09-05

## 2022-09-29 NOTE — LETTER
"9/29/2022    Saurabh Garza PA-C  [address]  [place] [address]    RE: Karen Sanchez       Dear Colleague,     I had the pleasure of seeing Karen Sanchez in the [facility].  [facility]    I had the pleasure of seeing Karen when she came for follow up of HTN and palpitations,.  This 82 year old sees Dr. Jackson for her history of:    1.  Symptomatic PACs, PVCs - previously seen on EKGs.  Have been well controlled on atenolol  3.  Benign essential hypertension      I saw Karen 2/2022 at which time she was grieving the loss of her  Maria Guadalupe, who had passed away 1/2022 d/t what sounds like kidney failure.  She had not started amlodipine as I had previously recommended d/t the stress she was under, but also was concerned that it could react with a sulfa allergy.  We reviewed this with pharmacy to ensure there would be no cross-reactivity.  When I contacted her to review this, she noted that BPs were much better in the 130s and she felt that her BPs have previously been higher d/t increased stress level.  Therefore, we had her continue current medications and recommended she see me 10/2022.    Interval History:  Karen is feeling \"better\" than when I saw her 2/2022. She's getting back to some exercises without any c/o CP, SOB. She is still grieving the loss of her , Maria Guadalupe, and notes she's not keeping her weight up angel well.    She notes mild L>R foot swelling but no pretibial edema. No orthopnea, PND. No LANIER/SOB. No CP.    No dizziness, lightheadedness. Gets up \"a little slowly\" but overall feels she's doing well. She's eager to visit her granddaughter in [place] in a few weeks.     BPs at home <140s over the last few months on atenolol. This also helps control her palpitations.    VITALS:  Vitals: BP (!) 148/64   Pulse 79   Ht 1.549 m (5' 1\")   Wt 45.4 kg (100 lb)   BMI 18.89 kg/m      Diagnostic Testing:  EKG today which I overread, showed SR 82 with " nonspecific STTW changes  EKG 10/2021 showed SR 68 bpm  LE US 9/2018 showed <50% LE stenosis bilaterally   Stress Echocardiogam 12/2011 showed no stress-induced wall motion  Echo 8/2007 showed normal EF 55-60% and no significant valvular abnls      Plan:  Echo - we'll contact with results  Annual follow-up     Assessment/Plan:    1. Hypertension    BPs at home really look good 110-140s, mostly 130s    Remains on atenolol 50 mg daily    BP high here but really quite good at home.    PLAN:    Continue to monitor BP at home    Continue atenolol    2. PVCs    Palpitations are under good control on atenolol    Last echo 2011 was wnl    PLAN:    Updated echo. We'll contact with results      Tiera Remy PA-C, MSPAS      Orders Placed This Encounter   Procedures     EKG 12-lead complete w/read - Clinics (performed today)     Echocardiogram Complete     Orders Placed This Encounter   Medications     atenolol (TENORMIN) 50 MG tablet     Sig: Take 1 tablet (50 mg) by mouth daily     Dispense:  90 tablet     Refill:  3     Medications Discontinued During This Encounter   Medication Reason     amLODIPine (NORVASC) 10 MG tablet Stopped by Patient     atenolol (TENORMIN) 50 MG tablet Reorder         Encounter Diagnosis   Name Primary?     Premature beats Yes       CURRENT MEDICATIONS:  Current Outpatient Medications   Medication Sig Dispense Refill     atenolol (TENORMIN) 50 MG tablet Take 1 tablet (50 mg) by mouth daily 90 tablet 3     Calcium Carbonate-Vitamin D (CALCIUM + D PO) Take  by mouth.       Cholecalciferol (VITAMIN D PO) Take  by mouth.         ALLERGIES     Allergies   Allergen Reactions     Sulfa Drugs Rash     Hctz      Cough, dehydration, CROSS-REACTION with sulfa allergy       Darvon [Propoxyphene Hcl] Other (See Comments)     Stiff neck           Review of Systems:  Skin:  Negative     Eyes:  Positive for glasses  ENT:  Negative    Respiratory:  Negative for dyspnea on exertion  Cardiovascular:  Negative  "for;chest pain;syncope or near-syncope;exercise intolerance;fatigue;lightheadedness;dizziness;palpitations    Gastroenterology: Negative melena;hematochezia  Genitourinary:  not assessed    Musculoskeletal:  Negative    Neurologic:  Negative    Psychiatric:  not assessed excessive stress  Heme/Lymph/Imm:  Negative    Endocrine:  Negative      Physical Exam:  Vitals: BP (!) 148/64   Pulse 79   Ht 1.549 m (5' 1\")   Wt 45.4 kg (100 lb)   BMI 18.89 kg/m      Constitutional:  cooperative, alert and oriented, well developed, well nourished, in no acute distress        Skin:  warm and dry to the touch   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)    Head:  normocephalic, no masses or lesions        Eyes:  pupils equal and round;conjunctivae and lids unremarkable;sclera white        ENT:  no pallor or cyanosis, dentition good        Neck:  JVP normal;no carotid bruit        Chest:  normal breath sounds, clear to auscultation, normal A-P diameter, normal symmetry, normal respiratory excursion, no use of accessory muscles        Cardiac: regular rhythm, normal S1/S2, no S3 or S4, apical impulse not displaced, no murmurs, gallops or rubs                  Abdomen:  abdomen soft        Vascular: pulses full and equal                                      Extremities and Back:  no deformities, clubbing, cyanosis, erythema observed;no edema        Neurological:  no gross motor deficits            PAST MEDICAL HISTORY:  Past Medical History:   Diagnosis Date     Arm fracture, right 12/11     Essential hypertension, benign      Hypertension      PAC (premature atrial contraction)      Pneumonia 1988     PVC (premature ventricular contraction)      Recurrent Otitis media        PAST SURGICAL HISTORY:  Past Surgical History:   Procedure Laterality Date     OPEN REDUCTION INTERNAL FIXATION HUMERUS PROXIMAL  12/27/2011    Procedure:OPEN REDUCTION INTERNAL FIXATION HUMERUS PROXIMAL; OPEN REDUCTION INTERNAL FIXATION RIGHT PROXIMAL " HUMERUS ; Surgeon:OCTAVIO WRIGHT Location:[facility] OR     TONSILLECTOMY  as child       FAMILY HISTORY:  Family History   Problem Relation Age of Onset     Cerebrovascular Disease Mother         age 83     Cerebrovascular Disease Father         age  82       SOCIAL HISTORY:  Social History     Socioeconomic History     Marital status:      Spouse name: None     Number of children: None     Years of education: None     Highest education level: None   Occupational History     Occupation: retired   Tobacco Use     Smoking status: Never Smoker     Smokeless tobacco: Never Used   Substance and Sexual Activity     Alcohol use: No     Drug use: No     Sexual activity: Not Currently     Partners: Male               Thank you for allowing me to participate in the care of your patient.      Sincerely,     Saundra Remy PA-C     [facility]  cc:   Saundra Remy PA-C  [address] [address]

## 2022-09-29 NOTE — PATIENT INSTRUCTIONS
Karen - it was good to see you again today!    Reviewed that heart-wise you're doing well!  Reviewed BP much better when you check it!      PLAN:  I refilled your atenolol today  Echocardiogram (ultrasound of heart) in the next few months - we'll contact you with results      [phone]

## 2022-11-02 ENCOUNTER — HOSPITAL ENCOUNTER (OUTPATIENT)
Dept: CARDIOLOGY | Facility: CLINIC | Age: 82
Discharge: HOME | End: 2022-11-02
Attending: PHYSICIAN ASSISTANT | Admitting: PHYSICIAN ASSISTANT
Payer: COMMERCIAL

## 2022-11-02 DIAGNOSIS — I49.49 PREMATURE BEATS: ICD-10-CM

## 2022-11-02 LAB — LVEF ECHO: NORMAL

## 2022-11-02 PROCEDURE — 93306 TTE W/DOPPLER COMPLETE: CPT | Mod: 26 | Performed by: INTERNAL MEDICINE

## 2022-11-02 PROCEDURE — 93306 TTE W/DOPPLER COMPLETE: CPT

## 2022-11-04 ENCOUNTER — DOCUMENTATION ONLY (OUTPATIENT)
Dept: CARDIOLOGY | Facility: CLINIC | Age: 82
End: 2022-11-04

## 2022-11-04 DIAGNOSIS — I10 ESSENTIAL HYPERTENSION WITH GOAL BLOOD PRESSURE LESS THAN 140/90: Primary | ICD-10-CM

## 2022-11-04 NOTE — PROGRESS NOTES
11/04/22 Spoke w pt and explained results and recommendations. She voiced understanding and agreement w plan.  Marcelino ARANA 1240 pm

## 2022-11-04 NOTE — PROGRESS NOTES
Please call Karen and let her know I reviewed echo, done for routine follow-up and PVCs.      Overall, this looks good!  No evidence of weakened heart muscle with hyperdynamic LVEF greater than 70%.  Mild valvular abnormalities.  Mild pulmonary hypertension with RVSP 38+ RAP.    No changes needed.  See me 9/2023 as planned, but call if issues sooner    Thanks-Tiera  November 4, 2022 at 6:30 AM

## 2023-09-03 NOTE — PROGRESS NOTES
"[facility]    I had the pleasure of seeing Karen when she came for follow up of HTN.  This 83 year old saw Dr. 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    HTN        Last Visit & Interval History:  I saw Karen 9/2022 at which time she was doing well, increasing exercise and eager to visit her granddaughter in [place]. BPs were under good control, as were palpitations. No changes made and routine follow-up recommended (to avoid winter snow) with updated echo. This showed hyperdynamic LVEF without significant valve abls.    Today's Visit:  Karen thinks things are going well!  She does note some occasional episodes of palpitations, but notes they're very infrequent. Still feels atenolol is working well to control her ectopy.  She will take just 1/2 tablet occasionally if she's \"feeling tired\" but overall can take a normal dose  . HR at home 60-70s.    Continues to wear compression stockings. Minimal LE edema, worse in the heat. No issues with orthopnea, PND.    BPs at home 110-140s/60s with majority in 130s. Much higher here, even when I rechecked manually.    VITALS:  Vitals: BP (!) 180/68 (Patient Position: Right side)   Pulse 77   Ht 1.549 m (5' 1\")   Wt 46.1 kg (101 lb 9.6 oz)   SpO2 96%   BMI 19.20 kg/m      Diagnostic Testing:  EKG today, which I overread, showed SR 73 bpm. Nonspecific STTW changes  Echo 11/2022 showed  LVEF >70% with mild valve abnls. Mild PH RVSP 38+RAP  LE US 9/2018 showed <50% LE stenosis bilaterally   Stress Echocardiogam 12/2011 showed no stress-induced wall motion  Echo 8/2007 showed normal EF 55-60% and no significant valvular abnls      Plan:  BMP. If normal, will start lisinopril 5 mg daily  2.    See me back to review as doesn't have an appt with KOLBY Garza set up      Assessment/Plan:    HTN  Remains on atenolol 50 mg daily at night  BP excellent at home and she thinks her cuff has been found to be accurate.    PLAN:  Bring BP cuff " "into next appt  BMP, then likely add lisinopril 5 mg daily with follow-up 1-2 m later   She does not want to start amlodipine as she's afraid it may react with her sulfa reaction. She was not reassured by me calling the Pharmacist 3/2022 to confirm there wasn't a listed reaction and prefers not to start that    Ectopy  PVCs and PACs had been sx'c and documented on EKG. Well controlled on atenolol  She's had to cut dose in 1/2 at times when she feels \"tired\" and sometimes notes brief palpitations  Updated echo 2022 with nl LVEF    PLAN:  Continue atenolol for now  Call if more episodes of palpitations or needing to take lower dose of atenolol. We can always get a monitor      Tiera Remy PA-C, MSPAS      Orders Placed This Encounter   Procedures    Basic metabolic panel    Follow-Up with Cardiology TED    EKG 12-lead complete w/read - Clinics (performed today)     Orders Placed This Encounter   Medications    atenolol (TENORMIN) 50 MG tablet     Sig: Take 1 tablet (50 mg) by mouth daily     Dispense:  90 tablet     Refill:  3     Medications Discontinued During This Encounter   Medication Reason    atenolol (TENORMIN) 50 MG tablet Reorder (No AVS)         Encounter Diagnoses   Name Primary?    Essential hypertension with goal blood pressure less than 140/90     Premature beats        CURRENT MEDICATIONS:  Current Outpatient Medications   Medication Sig Dispense Refill    atenolol (TENORMIN) 50 MG tablet Take 1 tablet (50 mg) by mouth daily 90 tablet 3    Calcium Carbonate-Vitamin D (CALCIUM + D PO) Take  by mouth.      Cholecalciferol (VITAMIN D PO) Take  by mouth. (Patient not taking: Reported on 9/5/2023)         ALLERGIES     Allergies   Allergen Reactions    Sulfa Antibiotics Rash    Hctz      Cough, dehydration, CROSS-REACTION with sulfa allergy      Darvon [Propoxyphene Hcl] Other (See Comments)     Stiff neck           Review of Systems:  Skin:  Negative     Eyes:  Positive for glasses  ENT:  Negative  " "  Respiratory:  Negative for dyspnea on exertion  Cardiovascular:  Negative for;chest pain;syncope or near-syncope;exercise intolerance;fatigue;lightheadedness;dizziness Positive for;palpitations  Gastroenterology: Negative melena;hematochezia  Genitourinary:  not assessed    Musculoskeletal:  Negative    Neurologic:  Negative    Psychiatric:  not assessed excessive stress  Heme/Lymph/Imm:  Negative    Endocrine:  Negative      Physical Exam:  Vitals: BP (!) 180/68 (Patient Position: Right side)   Pulse 77   Ht 1.549 m (5' 1\")   Wt 46.1 kg (101 lb 9.6 oz)   SpO2 96%   BMI 19.20 kg/m      Constitutional:  cooperative, alert and oriented, well developed, well nourished, in no acute distress        Skin:  warm and dry to the touch   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)    Head:  normocephalic, no masses or lesions        Eyes:  pupils equal and round;conjunctivae and lids unremarkable;sclera white        ENT:  no pallor or cyanosis, dentition good        Neck:  JVP normal;no carotid bruit        Chest:  normal breath sounds, clear to auscultation, normal A-P diameter, normal symmetry, normal respiratory excursion, no use of accessory muscles        Cardiac: regular rhythm, normal S1/S2, no S3 or S4, apical impulse not displaced, no murmurs, gallops or rubs                  Abdomen:  abdomen soft        Vascular: pulses full and equal                                      Extremities and Back:  no deformities, clubbing, cyanosis, erythema observed;no edema        Neurological:  no gross motor deficits            PAST MEDICAL HISTORY:  Past Medical History:   Diagnosis Date    Arm fracture, right 12/11    Essential hypertension, benign     Hypertension     PAC (premature atrial contraction)     Pneumonia 1988    PVC (premature ventricular contraction)     Recurrent Otitis media        PAST SURGICAL HISTORY:  Past Surgical History:   Procedure Laterality Date    OPEN REDUCTION INTERNAL FIXATION HUMERUS PROXIMAL  " 12/27/2011    Procedure:OPEN REDUCTION INTERNAL FIXATION HUMERUS PROXIMAL; OPEN REDUCTION INTERNAL FIXATION RIGHT PROXIMAL HUMERUS ; Surgeon:OCTAVIO WRIGHT; Location:[facility] OR    TONSILLECTOMY  as child       FAMILY HISTORY:  Family History   Problem Relation Age of Onset    Cerebrovascular Disease Mother         age 83    Cerebrovascular Disease Father         age  82       SOCIAL HISTORY:  Social History     Socioeconomic History    Marital status:      Spouse name: None    Number of children: None    Years of education: None    Highest education level: None   Occupational History    Occupation: retired   Tobacco Use    Smoking status: Never    Smokeless tobacco: Never   Substance and Sexual Activity    Alcohol use: No    Drug use: No    Sexual activity: Not Currently     Partners: Male

## 2023-09-05 ENCOUNTER — OFFICE VISIT (OUTPATIENT)
Dept: CARDIOLOGY | Facility: CLINIC | Age: 83
End: 2023-09-05
Attending: PHYSICIAN ASSISTANT
Payer: COMMERCIAL

## 2023-09-05 ENCOUNTER — LAB (OUTPATIENT)
Dept: LAB | Facility: CLINIC | Age: 83
End: 2023-09-05
Payer: COMMERCIAL

## 2023-09-05 VITALS
DIASTOLIC BLOOD PRESSURE: 68 MMHG | OXYGEN SATURATION: 96 % | BODY MASS INDEX: 19.18 KG/M2 | WEIGHT: 101.6 LBS | HEART RATE: 77 BPM | HEIGHT: 61 IN | SYSTOLIC BLOOD PRESSURE: 180 MMHG

## 2023-09-05 DIAGNOSIS — I49.49 PREMATURE BEATS: ICD-10-CM

## 2023-09-05 DIAGNOSIS — I10 ESSENTIAL HYPERTENSION WITH GOAL BLOOD PRESSURE LESS THAN 140/90: ICD-10-CM

## 2023-09-05 LAB
ANION GAP SERPL CALCULATED.3IONS-SCNC: 12 MMOL/L (ref 7–15)
BUN SERPL-MCNC: 18.1 MG/DL (ref 8–23)
CALCIUM SERPL-MCNC: 9.5 MG/DL (ref 8.8–10.2)
CHLORIDE SERPL-SCNC: 106 MMOL/L (ref 98–107)
CREAT SERPL-MCNC: 0.84 MG/DL (ref 0.51–0.95)
DEPRECATED HCO3 PLAS-SCNC: 25 MMOL/L (ref 22–29)
GFR SERPL CREATININE-BSD FRML MDRD: 69 ML/MIN/1.73M2
GLUCOSE SERPL-MCNC: 138 MG/DL (ref 70–99)
POTASSIUM SERPL-SCNC: 4.2 MMOL/L (ref 3.4–5.3)
SODIUM SERPL-SCNC: 143 MMOL/L (ref 136–145)

## 2023-09-05 PROCEDURE — 80048 BASIC METABOLIC PNL TOTAL CA: CPT

## 2023-09-05 PROCEDURE — 36415 COLL VENOUS BLD VENIPUNCTURE: CPT

## 2023-09-05 PROCEDURE — 99214 OFFICE O/P EST MOD 30 MIN: CPT | Performed by: PHYSICIAN ASSISTANT

## 2023-09-05 PROCEDURE — 93000 ELECTROCARDIOGRAM COMPLETE: CPT | Performed by: PHYSICIAN ASSISTANT

## 2023-09-05 RX ORDER — ATENOLOL 50 MG/1
50 TABLET ORAL DAILY
Qty: 90 TABLET | Refills: 3 | Status: SHIPPED | OUTPATIENT
Start: 2023-09-05 | End: 2023-12-21

## 2023-09-05 NOTE — LETTER
"9/5/2023    Saurabh Garza PA-C  [address]  [place] [address]    RE: Karen Sanchez       Dear Colleague,     I had the pleasure of seeing Karen Sanchez in the [facility].  [facility]    I had the pleasure of seeing Karen when she came for follow up of HTN.  This 83 year old saw Dr. 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    HTN        Last Visit & Interval History:  I saw Karen 9/2022 at which time she was doing well, increasing exercise and eager to visit her granddaughter in [place]. BPs were under good control, as were palpitations. No changes made and routine follow-up recommended (to avoid winter snow) with updated echo. This showed hyperdynamic LVEF without significant valve abls.    Today's Visit:  Karen thinks things are going well!  She does note some occasional episodes of palpitations, but notes they're very infrequent. Still feels atenolol is working well to control her ectopy.  She will take just 1/2 tablet occasionally if she's \"feeling tired\" but overall can take a normal dose  . HR at home 60-70s.    Continues to wear compression stockings. Minimal LE edema, worse in the heat. No issues with orthopnea, PND.    BPs at home 110-140s/60s with majority in 130s. Much higher here, even when I rechecked manually.    VITALS:  Vitals: BP (!) 180/68 (Patient Position: Right side)   Pulse 77   Ht 1.549 m (5' 1\")   Wt 46.1 kg (101 lb 9.6 oz)   SpO2 96%   BMI 19.20 kg/m      Diagnostic Testing:  EKG today, which I overread, showed SR 73 bpm. Nonspecific STTW changes  Echo 11/2022 showed  LVEF >70% with mild valve abnls. Mild PH RVSP 38+RAP  LE US 9/2018 showed <50% LE stenosis bilaterally   Stress Echocardiogam 12/2011 showed no stress-induced wall motion  Echo 8/2007 showed normal EF 55-60% and no significant valvular abnls      Plan:  BMP. If normal, will start lisinopril 5 mg daily  2.    See me back to review " "as doesn't have an appt with KOLBY Garza set up      Assessment/Plan:    HTN  Remains on atenolol 50 mg daily at night  BP excellent at home and she thinks her cuff has been found to be accurate.    PLAN:  Bring BP cuff into next appt  BMP, then likely add lisinopril 5 mg daily with follow-up 1-2 m later   She does not want to start amlodipine as she's afraid it may react with her sulfa reaction. She was not reassured by me calling the Pharmacist 3/2022 to confirm there wasn't a listed reaction and prefers not to start that    Ectopy  PVCs and PACs had been sx'c and documented on EKG. Well controlled on atenolol  She's had to cut dose in 1/2 at times when she feels \"tired\" and sometimes notes brief palpitations  Updated echo 2022 with nl LVEF    PLAN:  Continue atenolol for now  Call if more episodes of palpitations or needing to take lower dose of atenolol. We can always get a monitor      Tiera Remy PA-C, MSPAS      Orders Placed This Encounter   Procedures    Basic metabolic panel    Follow-Up with Cardiology TED    EKG 12-lead complete w/read - Clinics (performed today)     Orders Placed This Encounter   Medications    atenolol (TENORMIN) 50 MG tablet     Sig: Take 1 tablet (50 mg) by mouth daily     Dispense:  90 tablet     Refill:  3     Medications Discontinued During This Encounter   Medication Reason    atenolol (TENORMIN) 50 MG tablet Reorder (No AVS)         Encounter Diagnoses   Name Primary?    Essential hypertension with goal blood pressure less than 140/90     Premature beats        CURRENT MEDICATIONS:  Current Outpatient Medications   Medication Sig Dispense Refill    atenolol (TENORMIN) 50 MG tablet Take 1 tablet (50 mg) by mouth daily 90 tablet 3    Calcium Carbonate-Vitamin D (CALCIUM + D PO) Take  by mouth.      Cholecalciferol (VITAMIN D PO) Take  by mouth. (Patient not taking: Reported on 9/5/2023)         ALLERGIES     Allergies   Allergen Reactions    Sulfa Antibiotics Rash    Hctz      Cough, " "dehydration, CROSS-REACTION with sulfa allergy      Darvon [Propoxyphene Hcl] Other (See Comments)     Stiff neck           Review of Systems:  Skin:  Negative     Eyes:  Positive for glasses  ENT:  Negative    Respiratory:  Negative for dyspnea on exertion  Cardiovascular:  Negative for;chest pain;syncope or near-syncope;exercise intolerance;fatigue;lightheadedness;dizziness Positive for;palpitations  Gastroenterology: Negative melena;hematochezia  Genitourinary:  not assessed    Musculoskeletal:  Negative    Neurologic:  Negative    Psychiatric:  not assessed excessive stress  Heme/Lymph/Imm:  Negative    Endocrine:  Negative      Physical Exam:  Vitals: BP (!) 180/68 (Patient Position: Right side)   Pulse 77   Ht 1.549 m (5' 1\")   Wt 46.1 kg (101 lb 9.6 oz)   SpO2 96%   BMI 19.20 kg/m      Constitutional:  cooperative, alert and oriented, well developed, well nourished, in no acute distress        Skin:  warm and dry to the touch   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)    Head:  normocephalic, no masses or lesions        Eyes:  pupils equal and round;conjunctivae and lids unremarkable;sclera white        ENT:  no pallor or cyanosis, dentition good        Neck:  JVP normal;no carotid bruit        Chest:  normal breath sounds, clear to auscultation, normal A-P diameter, normal symmetry, normal respiratory excursion, no use of accessory muscles        Cardiac: regular rhythm, normal S1/S2, no S3 or S4, apical impulse not displaced, no murmurs, gallops or rubs                  Abdomen:  abdomen soft        Vascular: pulses full and equal                                      Extremities and Back:  no deformities, clubbing, cyanosis, erythema observed;no edema        Neurological:  no gross motor deficits            PAST MEDICAL HISTORY:  Past Medical History:   Diagnosis Date    Arm fracture, right 12/11    Essential hypertension, benign     Hypertension     PAC (premature atrial contraction)     Pneumonia " 1988    PVC (premature ventricular contraction)     Recurrent Otitis media        PAST SURGICAL HISTORY:  Past Surgical History:   Procedure Laterality Date    OPEN REDUCTION INTERNAL FIXATION HUMERUS PROXIMAL  12/27/2011    Procedure:OPEN REDUCTION INTERNAL FIXATION HUMERUS PROXIMAL; OPEN REDUCTION INTERNAL FIXATION RIGHT PROXIMAL HUMERUS ; Surgeon:OCTAVIO WRIGHT; Location:[facility] OR    TONSILLECTOMY  as child       FAMILY HISTORY:  Family History   Problem Relation Age of Onset    Cerebrovascular Disease Mother         age 83    Cerebrovascular Disease Father         age  82       SOCIAL HISTORY:  Social History     Socioeconomic History    Marital status:      Spouse name: None    Number of children: None    Years of education: None    Highest education level: None   Occupational History    Occupation: retired   Tobacco Use    Smoking status: Never    Smokeless tobacco: Never   Substance and Sexual Activity    Alcohol use: No    Drug use: No    Sexual activity: Not Currently     Partners: Male               Thank you for allowing me to participate in the care of your patient.      Sincerely,     Saunrda Remy PA-C     [facility]  cc:   Saundra Remy PA-C  [address]  [place]  [place] [address]

## 2023-09-05 NOTE — PATIENT INSTRUCTIONS
"Karen - it was good to see you today!    Reviewed that EKG looked good! Normal rhythm. No PACs/PVCs  BP was WAY too high when you came in and when I rechecked but they're really good at home    PLAN:  CALL if issues with more fatigue, needing to take lower dose of atenolol or more times your cuff says \"irregular\" - we'll have you wear an updated monitor  [phone]  Get updated blood work to check kidneys/electrolytes. If no availability today, can call Mayelin Pendleton and ask for \"lab only visit.\" My orders are in  Once we see result will likely start lisinopril 5 mg daily and send Rx in with follow-up appt with me or Saurabh Garza to recheck BP  CALL if issues! [phone]    "

## 2023-09-06 ENCOUNTER — DOCUMENTATION ONLY (OUTPATIENT)
Dept: CARDIOLOGY | Facility: CLINIC | Age: 83
End: 2023-09-06
Payer: COMMERCIAL

## 2023-09-06 DIAGNOSIS — I10 ESSENTIAL HYPERTENSION WITH GOAL BLOOD PRESSURE LESS THAN 140/90: Primary | ICD-10-CM

## 2023-09-06 NOTE — PROGRESS NOTES
Pls let Karen know that BMP looked good!    As we discussed during our OV yesterday 9/5, start lisinopril 5 mg daily. Pls send Rx in  Can take AM or PM, whichever she prefers.   Keep track of BPs    2.  Nonfasting BMP in 2-3 weeks. I've ordered. She may want to get these at PCP's  3.  See me 11/10 as planned - bring BP cuff in again so we can assess accuracy.    Yaya Mott

## 2023-09-07 RX ORDER — LISINOPRIL 5 MG/1
5 TABLET ORAL DAILY
Qty: 30 TABLET | Refills: 4 | Status: SHIPPED | OUTPATIENT
Start: 2023-09-07 | End: 2023-12-21

## 2023-09-07 NOTE — PROGRESS NOTES
Spoke to to pt and made her aware that the BMP looked good and lawrence would like pt to start taking Lisinopril 5 mg daily. Escript sent. Pt asked to check her BP once a day either AM or PM. And then she will bring in along with her cuff when she see's Lawrence on 11/10.  Pt to have a non-fasting BMP in 2-3 weeks. Will have scheduling reach out to pt to arrange.  Pt states understanding a has no questions at this time. Angela

## 2023-11-08 ENCOUNTER — LAB (OUTPATIENT)
Dept: LAB | Facility: CLINIC | Age: 83
End: 2023-11-08
Payer: COMMERCIAL

## 2023-11-08 DIAGNOSIS — I10 ESSENTIAL HYPERTENSION WITH GOAL BLOOD PRESSURE LESS THAN 140/90: ICD-10-CM

## 2023-11-08 LAB
ANION GAP SERPL CALCULATED.3IONS-SCNC: 10 MMOL/L (ref 7–15)
BUN SERPL-MCNC: 21.3 MG/DL (ref 8–23)
CALCIUM SERPL-MCNC: 9.5 MG/DL (ref 8.8–10.2)
CHLORIDE SERPL-SCNC: 103 MMOL/L (ref 98–107)
CREAT SERPL-MCNC: 0.76 MG/DL (ref 0.51–0.95)
DEPRECATED HCO3 PLAS-SCNC: 27 MMOL/L (ref 22–29)
EGFRCR SERPLBLD CKD-EPI 2021: 78 ML/MIN/1.73M2
GLUCOSE SERPL-MCNC: 145 MG/DL (ref 70–99)
POTASSIUM SERPL-SCNC: 4 MMOL/L (ref 3.4–5.3)
SODIUM SERPL-SCNC: 140 MMOL/L (ref 135–145)

## 2023-11-08 PROCEDURE — 80048 BASIC METABOLIC PNL TOTAL CA: CPT

## 2023-11-08 PROCEDURE — 36415 COLL VENOUS BLD VENIPUNCTURE: CPT

## 2023-11-08 NOTE — PROGRESS NOTES
"[facility]    I had the pleasure of seeing Karen when she came for follow up of HTN.  This 83 year old saw Dr. 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    HTN        Last Visit & Interval History:  I saw Karen 9/2023 at which time she was doing well, with infrequent palpitations. BPs were higher and I started lisinopril 5 mg daily after BMP found to be OK.    Today's Visit:  Since starting lisinopril 5 mg daily, Karen thinks that things are \"going okay\" but she still notes elevated BPs.      She denies dizziness, lightheadedness.  Denies cough.  Continues to be without chest pain, pressure, tightness.  No significant edema, orthopnea, PND.    VITALS:  Vitals: BP (!) 166/72   Pulse 80   Ht 1.549 m (5' 1\")   Wt 45.6 kg (100 lb 8 oz)   SpO2 95%   BMI 18.99 kg/m      Diagnostic Testing:  EKG 9/2023 showed SR 73 bpm. Nonspecific STTW changes  Echo 11/2022 showed  LVEF >70% with mild valve abnls. Mild PH RVSP 38+RAP  LE US 9/2018 showed <50% LE stenosis bilaterally   Stress Echocardiogam 12/2011 showed no stress-induced wall motion  Echo 8/2007 showed normal EF 55-60% and no significant valvular abnls  Component      Latest Ref Rng 9/5/2023  3:14 PM 11/8/2023  2:57 PM   Sodium      135 - 145 mmol/L 143  140    Potassium      3.4 - 5.3 mmol/L 4.2  4.0    Chloride      98 - 107 mmol/L 106  103    Carbon Dioxide (CO2)      22 - 29 mmol/L 25  27    Anion Gap      7 - 15 mmol/L 12  10    Urea Nitrogen      8.0 - 23.0 mg/dL 18.1  21.3    Creatinine      0.51 - 0.95 mg/dL 0.84  0.76    Calcium      8.8 - 10.2 mg/dL 9.5  9.5    Glucose      70 - 99 mg/dL 138 (H)  145 (H)    GFR Estimate      >60 mL/min/1.73m2 69  78          Plan:  Get new blood pressure cuff or put batteries in home blood pressure cuff  See me back 4-6 weeks.  Can be switched to a video visit if weather is poor, but I would like to see her so I can check her BP cuff " "again      Assessment/Plan:    HTN  Remains on atenolol 50 mg daily at night  Based on BP check today, her cuff does not appear accurate.  She agrees it is quite old, and may be due for a replacement or at least any batteries.  BPs at home are ranging 120-170s with majority 140-160s.  I reviewed that I would like to see more of them in the 120s-140s.,   She asks why I chose lisinopril, and we reviewed her sulfa allergy and her hesitancy in starting amlodipine despite calling the pharmacy and ensuring there is no evidence of any cross-reactivity with the sulfa allergy.  Reviewed that I did not increase her atenolol based on HRs sometimes getting into the 60s.  Reviewed that we could switch her atenolol to carvedilol, but may have more trouble with palpitations  Reviewed that we could use the amlodipine as suggested by Dr. Jackson, as the pharmacist confirmed that there was no cross-reactivity with amlodipine  Reviewed that we could try diuretic therapy (I believe indapamide is sulfa 3), but would need to check    PLAN:  At this time, she declines increasing lisinopril despite acknowledgment that BP is still elevated   She will get a new BP cuff or replace the batteries in her current cuff, and see me back in 4-6 weeks at which time we can check the accuracy again   See me back 4-6 weeks     2. Ectopy  PVCs and PACs had been sx'c and documented on EKG. Well controlled on atenolol  She's had to cut dose in 1/2 at times when she feels \"tired\" and sometimes notes brief palpitations  Updated echo 2022 with nl LVEF    PLAN:  Continue atenolol for now  Call if more episodes of palpitations or needing to take lower dose of atenolol. We can always get a monitor      Tiera Remy PA-C, MSPAS      Orders Placed This Encounter   Procedures    Follow-Up with Cardiology TED     No orders of the defined types were placed in this encounter.    There are no discontinued medications.        Encounter Diagnoses   Name Primary?    Essential " "hypertension with goal blood pressure less than 140/90     Premature beats          CURRENT MEDICATIONS:  Current Outpatient Medications   Medication Sig Dispense Refill    atenolol (TENORMIN) 50 MG tablet Take 1 tablet (50 mg) by mouth daily 90 tablet 3    Calcium Carbonate-Vitamin D (CALCIUM + D PO) Take  by mouth.      Cholecalciferol (VITAMIN D PO)       lisinopril (ZESTRIL) 5 MG tablet Take 1 tablet (5 mg) by mouth daily 30 tablet 4       ALLERGIES     Allergies   Allergen Reactions    Sulfa Antibiotics Rash    Hctz      Cough, dehydration, CROSS-REACTION with sulfa allergy      Darvon [Propoxyphene Hcl] Other (See Comments)     Stiff neck           Review of Systems:  Skin:  Negative     Eyes:  Positive for glasses  ENT:  Negative    Respiratory:  Negative for dyspnea on exertion  Cardiovascular:  Negative for;chest pain;syncope or near-syncope;exercise intolerance;fatigue;lightheadedness;dizziness;palpitations    Gastroenterology: Negative melena;hematochezia  Genitourinary:  not assessed    Musculoskeletal:  Negative    Neurologic:  Negative    Psychiatric:  not assessed excessive stress  Heme/Lymph/Imm:  Negative    Endocrine:  Negative      Physical Exam:  Vitals: BP (!) 166/72   Pulse 80   Ht 1.549 m (5' 1\")   Wt 45.6 kg (100 lb 8 oz)   SpO2 95%   BMI 18.99 kg/m      Constitutional:  cooperative, alert and oriented, well developed, well nourished, in no acute distress        Skin:  warm and dry to the touch, no apparent skin lesions or masses noted        Head:  normocephalic, no masses or lesions        Eyes:  pupils equal and round;conjunctivae and lids unremarkable;sclera white        ENT:  no pallor or cyanosis, dentition good        Neck:  not assessed this visit        Chest:  not assessed this visit        Cardiac: no murmurs, gallops or rubs detected                  Abdomen:  not assessed this visit        Vascular: not assessed this visit                                      Extremities and " Back:  not assessed this visit        Neurological:  no gross motor deficits            PAST MEDICAL HISTORY:  Past Medical History:   Diagnosis Date    Arm fracture, right 12/11    Essential hypertension, benign     Hypertension     PAC (premature atrial contraction)     Pneumonia 1988    PVC (premature ventricular contraction)     Recurrent Otitis media        PAST SURGICAL HISTORY:  Past Surgical History:   Procedure Laterality Date    OPEN REDUCTION INTERNAL FIXATION HUMERUS PROXIMAL  12/27/2011    Procedure:OPEN REDUCTION INTERNAL FIXATION HUMERUS PROXIMAL; OPEN REDUCTION INTERNAL FIXATION RIGHT PROXIMAL HUMERUS ; Surgeon:OCTAVIO WRIGHT; Location:[facility] OR    TONSILLECTOMY  as child       FAMILY HISTORY:  Family History   Problem Relation Age of Onset    Cerebrovascular Disease Mother         age 83    Cerebrovascular Disease Father         age  82       SOCIAL HISTORY:  Social History     Socioeconomic History    Marital status:      Spouse name: None    Number of children: None    Years of education: None    Highest education level: None   Occupational History    Occupation: retired   Tobacco Use    Smoking status: Never    Smokeless tobacco: Never   Substance and Sexual Activity    Alcohol use: No    Drug use: No    Sexual activity: Not Currently     Partners: Male

## 2023-11-08 NOTE — PROGRESS NOTES
JEFF for pt to call back as she had not had her BMP completed that was to have been done the end of September. Angela

## 2023-11-10 ENCOUNTER — OFFICE VISIT (OUTPATIENT)
Dept: CARDIOLOGY | Facility: CLINIC | Age: 83
End: 2023-11-10
Attending: PHYSICIAN ASSISTANT
Payer: COMMERCIAL

## 2023-11-10 VITALS
WEIGHT: 100.5 LBS | HEART RATE: 80 BPM | OXYGEN SATURATION: 95 % | SYSTOLIC BLOOD PRESSURE: 166 MMHG | HEIGHT: 61 IN | BODY MASS INDEX: 18.98 KG/M2 | DIASTOLIC BLOOD PRESSURE: 72 MMHG

## 2023-11-10 DIAGNOSIS — I10 ESSENTIAL HYPERTENSION WITH GOAL BLOOD PRESSURE LESS THAN 140/90: ICD-10-CM

## 2023-11-10 DIAGNOSIS — I49.49 PREMATURE BEATS: ICD-10-CM

## 2023-11-10 PROCEDURE — 99213 OFFICE O/P EST LOW 20 MIN: CPT | Performed by: PHYSICIAN ASSISTANT

## 2023-11-10 NOTE — PATIENT INSTRUCTIONS
Karen - it was good to see you today!    Reviewed BP cuff may need new batteries!  Your BP here was 160s, but your cuff read 190s!  Blood work today looked good!    PLAN:    Agreed to keep you on 5 mg daily in late evening  New BP cuff or put new batteries in!  See me back ~4-6 weeks with update    [phone]

## 2023-11-10 NOTE — LETTER
"11/10/2023    Saurabh Garza PA-C  [address]  [place] [address]    RE: Karen Sanchez       Dear Colleague,     I had the pleasure of seeing Karen Sanchez in the [facility].  [facility]    I had the pleasure of seeing Karen when she came for follow up of HTN.  This 83 year old saw Dr. 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    HTN        Last Visit & Interval History:  I saw Karen 9/2023 at which time she was doing well, with infrequent palpitations. BPs were higher and I started lisinopril 5 mg daily after BMP found to be OK.    Today's Visit:  Since starting lisinopril 5 mg daily, Karen thinks that things are \"going okay\" but she still notes elevated BPs.      She denies dizziness, lightheadedness.  Denies cough.  Continues to be without chest pain, pressure, tightness.  No significant edema, orthopnea, PND.    VITALS:  Vitals: BP (!) 166/72   Pulse 80   Ht 1.549 m (5' 1\")   Wt 45.6 kg (100 lb 8 oz)   SpO2 95%   BMI 18.99 kg/m      Diagnostic Testing:  EKG 9/2023 showed SR 73 bpm. Nonspecific STTW changes  Echo 11/2022 showed  LVEF >70% with mild valve abnls. Mild PH RVSP 38+RAP  LE US 9/2018 showed <50% LE stenosis bilaterally   Stress Echocardiogam 12/2011 showed no stress-induced wall motion  Echo 8/2007 showed normal EF 55-60% and no significant valvular abnls  Component      Latest Ref Rng 9/5/2023  3:14 PM 11/8/2023  2:57 PM   Sodium      135 - 145 mmol/L 143  140    Potassium      3.4 - 5.3 mmol/L 4.2  4.0    Chloride      98 - 107 mmol/L 106  103    Carbon Dioxide (CO2)      22 - 29 mmol/L 25  27    Anion Gap      7 - 15 mmol/L 12  10    Urea Nitrogen      8.0 - 23.0 mg/dL 18.1  21.3    Creatinine      0.51 - 0.95 mg/dL 0.84  0.76    Calcium      8.8 - 10.2 mg/dL 9.5  9.5    Glucose      70 - 99 mg/dL 138 (H)  145 (H)    GFR Estimate      >60 mL/min/1.73m2 69  78          Plan:  Get new blood " "pressure cuff or put batteries in home blood pressure cuff  See me back 4-6 weeks.  Can be switched to a video visit if weather is poor, but I would like to see her so I can check her BP cuff again      Assessment/Plan:    HTN  Remains on atenolol 50 mg daily at night  Based on BP check today, her cuff does not appear accurate.  She agrees it is quite old, and may be due for a replacement or at least any batteries.  BPs at home are ranging 120-170s with majority 140-160s.  I reviewed that I would like to see more of them in the 120s-140s.,   She asks why I chose lisinopril, and we reviewed her sulfa allergy and her hesitancy in starting amlodipine despite calling the pharmacy and ensuring there is no evidence of any cross-reactivity with the sulfa allergy.  Reviewed that I did not increase her atenolol based on HRs sometimes getting into the 60s.  Reviewed that we could switch her atenolol to carvedilol, but may have more trouble with palpitations  Reviewed that we could use the amlodipine as suggested by Dr. Jackson, as the pharmacist confirmed that there was no cross-reactivity with amlodipine  Reviewed that we could try diuretic therapy (I believe indapamide is sulfa 3), but would need to check    PLAN:  At this time, she declines increasing lisinopril despite acknowledgment that BP is still elevated   She will get a new BP cuff or replace the batteries in her current cuff, and see me back in 4-6 weeks at which time we can check the accuracy again   See me back 4-6 weeks     2. Ectopy  PVCs and PACs had been sx'c and documented on EKG. Well controlled on atenolol  She's had to cut dose in 1/2 at times when she feels \"tired\" and sometimes notes brief palpitations  Updated echo 2022 with nl LVEF    PLAN:  Continue atenolol for now  Call if more episodes of palpitations or needing to take lower dose of atenolol. We can always get a monitor      Tiera Remy PA-C, MSPAS      Orders Placed This Encounter   Procedures    " "Follow-Up with Cardiology TED     No orders of the defined types were placed in this encounter.    There are no discontinued medications.        Encounter Diagnoses   Name Primary?    Essential hypertension with goal blood pressure less than 140/90     Premature beats          CURRENT MEDICATIONS:  Current Outpatient Medications   Medication Sig Dispense Refill    atenolol (TENORMIN) 50 MG tablet Take 1 tablet (50 mg) by mouth daily 90 tablet 3    Calcium Carbonate-Vitamin D (CALCIUM + D PO) Take  by mouth.      Cholecalciferol (VITAMIN D PO)       lisinopril (ZESTRIL) 5 MG tablet Take 1 tablet (5 mg) by mouth daily 30 tablet 4       ALLERGIES     Allergies   Allergen Reactions    Sulfa Antibiotics Rash    Hctz      Cough, dehydration, CROSS-REACTION with sulfa allergy      Darvon [Propoxyphene Hcl] Other (See Comments)     Stiff neck           Review of Systems:  Skin:  Negative     Eyes:  Positive for glasses  ENT:  Negative    Respiratory:  Negative for dyspnea on exertion  Cardiovascular:  Negative for;chest pain;syncope or near-syncope;exercise intolerance;fatigue;lightheadedness;dizziness;palpitations    Gastroenterology: Negative melena;hematochezia  Genitourinary:  not assessed    Musculoskeletal:  Negative    Neurologic:  Negative    Psychiatric:  not assessed excessive stress  Heme/Lymph/Imm:  Negative    Endocrine:  Negative      Physical Exam:  Vitals: BP (!) 166/72   Pulse 80   Ht 1.549 m (5' 1\")   Wt 45.6 kg (100 lb 8 oz)   SpO2 95%   BMI 18.99 kg/m      Constitutional:  cooperative, alert and oriented, well developed, well nourished, in no acute distress        Skin:  warm and dry to the touch, no apparent skin lesions or masses noted        Head:  normocephalic, no masses or lesions        Eyes:  pupils equal and round;conjunctivae and lids unremarkable;sclera white        ENT:  no pallor or cyanosis, dentition good        Neck:  not assessed this visit        Chest:  not assessed this visit    "     Cardiac: no murmurs, gallops or rubs detected                  Abdomen:  not assessed this visit        Vascular: not assessed this visit                                      Extremities and Back:  not assessed this visit        Neurological:  no gross motor deficits            PAST MEDICAL HISTORY:  Past Medical History:   Diagnosis Date    Arm fracture, right 12/11    Essential hypertension, benign     Hypertension     PAC (premature atrial contraction)     Pneumonia 1988    PVC (premature ventricular contraction)     Recurrent Otitis media        PAST SURGICAL HISTORY:  Past Surgical History:   Procedure Laterality Date    OPEN REDUCTION INTERNAL FIXATION HUMERUS PROXIMAL  12/27/2011    Procedure:OPEN REDUCTION INTERNAL FIXATION HUMERUS PROXIMAL; OPEN REDUCTION INTERNAL FIXATION RIGHT PROXIMAL HUMERUS ; Surgeon:OCTAVIO WRIGHT; Location:[facility] OR    TONSILLECTOMY  as child       FAMILY HISTORY:  Family History   Problem Relation Age of Onset    Cerebrovascular Disease Mother         age 83    Cerebrovascular Disease Father         age  82       SOCIAL HISTORY:  Social History     Socioeconomic History    Marital status:      Spouse name: None    Number of children: None    Years of education: None    Highest education level: None   Occupational History    Occupation: retired   Tobacco Use    Smoking status: Never    Smokeless tobacco: Never   Substance and Sexual Activity    Alcohol use: No    Drug use: No    Sexual activity: Not Currently     Partners: Male               Thank you for allowing me to participate in the care of your patient.      Sincerely,     Saundra Remy PA-C     [facility]  cc:   Saundra Remy PA-C  [address] [address]

## 2023-11-17 ENCOUNTER — TELEPHONE (OUTPATIENT)
Dept: CARDIOLOGY | Facility: CLINIC | Age: 83
End: 2023-11-17
Payer: COMMERCIAL

## 2023-11-17 NOTE — TELEPHONE ENCOUNTER
Spoke to pt who states that she has developed a temp of 110-102 and her voice is hoarse. Pt also has a bit of a cough. Pt thought maybe it was either the chinese she ate on Tuesday and the Lisinopril, of which she has only taken a couple of tabs.  Discussed with pt the these do not sound like a normal reaction to food or the Lisinopril and recommend that pt go to Urgent care and be seen. Pt told that she may be tested for COVID, but did explain that there is a lot of bugs/virus's going around right now. Pt states that she will see if she can get a her daughter to take her. Angela

## 2023-12-11 ENCOUNTER — TELEPHONE (OUTPATIENT)
Dept: CARDIOLOGY | Facility: CLINIC | Age: 83
End: 2023-12-11
Payer: COMMERCIAL

## 2023-12-11 NOTE — TELEPHONE ENCOUNTER
12/11/23 Pt called and wanted to let Tiera know she has not been taking her Lisinopril. She had not been feeling well on it, starting 11/17 when she spoke emory Wright ( see separate telephone encounter ) . She had a runny nose and sore throat and temp of 102. She did not seek evaluation in UC as recommended .  She has been continuing to take Atenolol 50 mg daily and checking her BPS on her old machine. Has been running 140-160/ 50's  She cancelled OV 12/14 w Tiera as she is worried about the weather and is now rescheduled for Feb 2024.  Discussed importance of updating BP cuff and purchase a new one as discussed at OV with Tiera on 11/10/23 and also needing to see Tiera back before Feb.  Pt stated she relies on her daughter for rides. Gave pt 4 upcoming openings with Tiera . She  will discuss w daughter tonight and I will call back tomorrow to set something up.  Pt voiced understanding and agreement with plan.   Cole 345 pm

## 2023-12-12 NOTE — TELEPHONE ENCOUNTER
12/12/23 Spoke w pt who discussed w daughter. They can re-schedule to 12/21 at 1230. Enc pt to get new BP cuff prior and begin checking 1-2 x/day and record and bring log to appt.  If not able to obtain new cuff, ok to use old one and bring those readings to appt  Message sent to scheduling  Cole 1007 am

## 2023-12-18 NOTE — PROGRESS NOTES
"[facility]    I had the pleasure of seeing Karen when she came for follow up of HTN.  This 83 year old saw Dr. 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    HTN      Last Visit & Interval History:  I saw Karen 10/2023 at which time she was tolerating low dose lisinopril, but BPs remained elevated. Her cuff at home was not accurate, unfortunately and I asked her get a new cuff and/or batteries placed with follow-up appt. Options for change in BP meds discussed (she refused amlodipine dosing as recommended by Dr. Jackson given her concern for sulfa allergy, despite confirmation of no interaction per multiple pharmacists). We discussed switching atenolol to carvedilol, watching palpitations closely, switch to the amlodipine previously recommended or start diuretic therapy (checking to see if indapamide was a concern with Sulfa allergy). She refused increasing ACEi dose.    On 11/17 she called concerned that lisinopril could be causing an allergy, as she had a Temp of 101-102 along with cough/hoarseness. UC follow-up recommended given BP med would not cause fever.    She called 12/11 noting she'd stopped lisinopril d/t runny nose, temp and sore throat as of 11/17  BPs on old machine continued 140-160/50s.     Today's Visit:  Overall, Karen is feeling well.  She denies recurrent palpitations and continues to feel atenolol is working adequately.  No issues with edema, orthopnea, PND.  No chest pain, pressure, tightness.      BPs at home with new BP cuff on L arm primarily have been 130s. She stopped the lisinopril 11/17. Runny nose, cough and fever have all resolved.  She was apparently told by a pharmacist that these could all be side effects of lisinopril.    Her BP cuff today (L wrist) showed 163/77, 30 points lower than what we got when I checked it with with manual cuff.    VITALS:  Vitals: BP (!) 184/64   Pulse 74   Ht 1.549 m (5' 1\")   Wt 43.5 kg (96 lb)   " SpO2 100%   BMI 18.14 kg/m      Diagnostic Testing:  EKG 9/2023 showed SR 73 bpm. Nonspecific STTW changes  Echo 11/2022 showed  LVEF >70% with mild valve abnls. Mild PH RVSP 38+RAP  LE US 9/2018 showed <50% LE stenosis bilaterally   Stress Echocardiogam 12/2011 showed no stress-induced wall motion  Echo 8/2007 showed normal EF 55-60% and no significant valvular abnls  Component      Latest Ref Rng 9/5/2023  3:14 PM 11/8/2023  2:57 PM   Sodium      135 - 145 mmol/L 143  140    Potassium      3.4 - 5.3 mmol/L 4.2  4.0    Chloride      98 - 107 mmol/L 106  103    Carbon Dioxide (CO2)      22 - 29 mmol/L 25  27    Anion Gap      7 - 15 mmol/L 12  10    Urea Nitrogen      8.0 - 23.0 mg/dL 18.1  21.3    Creatinine      0.51 - 0.95 mg/dL 0.84  0.76    Calcium      8.8 - 10.2 mg/dL 9.5  9.5    Glucose      70 - 99 mg/dL 138 (H)  145 (H)    GFR Estimate      >60 mL/min/1.73m2 69  78          Plan:  Increase atenolol to 25 mg AM/50 mg PM      Assessment/Plan:    HTN  Remains on atenolol 50 mg daily at night  Based on BP check today, her cuff does not appear accurate, and she is getting 30 points lower than I did when I checked it manually x 2    With her new cuff, BPs are 130s (so likely closer to 160s).  She and her daughter Sallie remain very skeptical on changing medications to improve BP control given she has had side effects/has allergies    We again reviewed medication options, and she would like to try increasing atenolol.  I explained that this likely would not have too much of an effect on her BP, but she is not interested in starting carvedilol instead of atenolol, retrying lisinopril (as I stressed fever/runny nose would not be typical adverse reactions), or starting diuretic therapy such as indapamide.    PLAN:  Increase atenolol to 25 mg in AM and 50 mg in PM  Continue to monitor BP she will write down the actual numbers she gets on her machine, but we may have to add 30 points to this to make it more accurate,  "as she was 30 points lower than what we got in clinic x 2 today   Virtual visit 1 month given she is concerned about the weather.    2. Ectopy  PVCs and PACs had been sx'c and documented on EKG. Well controlled on atenolol  She's had to cut dose in 1/2 at times when she feels \"tired\" and sometimes notes brief palpitations  Updated echo 2022 with nl LVEF    PLAN:  Continue atenolol with dose adjustment for BP as above.  Will need to monitor for fatigue.      Tiera Remy PA-C, MSPAS      Orders Placed This Encounter   Procedures    Follow-Up with Cardiology TED     Orders Placed This Encounter   Medications    atenolol (TENORMIN) 50 MG tablet     Sig: Take 0.5 tablets (25 mg) by mouth every morning AND 1 tablet (50 mg) every evening.     Dispense:  45 tablet     Refill:  11     Keep on file until pt due for refills - note dose change     Medications Discontinued During This Encounter   Medication Reason    lisinopril (ZESTRIL) 5 MG tablet Stopped by Patient (No AVS)    atenolol (TENORMIN) 50 MG tablet Reorder (No AVS)           Encounter Diagnoses   Name Primary?    Premature beats     Essential hypertension with goal blood pressure less than 140/90 Yes           CURRENT MEDICATIONS:  Current Outpatient Medications   Medication Sig Dispense Refill    atenolol (TENORMIN) 50 MG tablet Take 0.5 tablets (25 mg) by mouth every morning AND 1 tablet (50 mg) every evening. 45 tablet 11    Calcium Carbonate-Vitamin D (CALCIUM + D PO) Take  by mouth.      Cholecalciferol (VITAMIN D PO)          ALLERGIES     Allergies   Allergen Reactions    Sulfa Antibiotics Rash    Hctz      Cough, dehydration, CROSS-REACTION with sulfa allergy      Darvon [Propoxyphene Hcl] Other (See Comments)     Stiff neck           Review of Systems:  Skin:        Eyes:       ENT:       Respiratory:  Negative    Cardiovascular:  Negative for;chest pain;palpitations;fatigue;lightheadedness;dizziness;syncope or near-syncope Positive " "for;edema  Gastroenterology:      Genitourinary:       Musculoskeletal:       Neurologic:       Psychiatric:       Heme/Lymph/Imm:  Negative    Endocrine:  Negative      Physical Exam:  Vitals: BP (!) 184/64   Pulse 74   Ht 1.549 m (5' 1\")   Wt 43.5 kg (96 lb)   SpO2 100%   BMI 18.14 kg/m      Constitutional:  cooperative, alert and oriented, well developed, well nourished, in no acute distress        Skin:  warm and dry to the touch, no apparent skin lesions or masses noted        Head:  normocephalic, no masses or lesions        Eyes:  pupils equal and round;conjunctivae and lids unremarkable;sclera white        ENT:  no pallor or cyanosis, dentition good        Neck:  not assessed this visit        Chest:  not assessed this visit        Cardiac: no murmurs, gallops or rubs detected                  Abdomen:  abdomen soft, non-tender, BS normoactive, no mass, no HSM, no bruits        Vascular: not assessed this visit                                      Extremities and Back:  no deformities, clubbing, cyanosis, erythema observed;no edema        Neurological:  no gross motor deficits            PAST MEDICAL HISTORY:  Past Medical History:   Diagnosis Date    Arm fracture, right 12/11    Essential hypertension, benign     Hypertension     PAC (premature atrial contraction)     Pneumonia 1988    PVC (premature ventricular contraction)     Recurrent Otitis media        PAST SURGICAL HISTORY:  Past Surgical History:   Procedure Laterality Date    OPEN REDUCTION INTERNAL FIXATION HUMERUS PROXIMAL  12/27/2011    Procedure:OPEN REDUCTION INTERNAL FIXATION HUMERUS PROXIMAL; OPEN REDUCTION INTERNAL FIXATION RIGHT PROXIMAL HUMERUS ; Surgeon:OCTAVIO WRIGHT; Location:[facility] OR    TONSILLECTOMY  as child       FAMILY HISTORY:  Family History   Problem Relation Age of Onset    Cerebrovascular Disease Mother         age 83    Cerebrovascular Disease Father         age  82       SOCIAL HISTORY:  Social History "     Socioeconomic History    Marital status:      Spouse name: None    Number of children: None    Years of education: None    Highest education level: None   Occupational History    Occupation: retired   Tobacco Use    Smoking status: Never    Smokeless tobacco: Never   Substance and Sexual Activity    Alcohol use: No    Drug use: No    Sexual activity: Not Currently     Partners: Male

## 2023-12-21 ENCOUNTER — OFFICE VISIT (OUTPATIENT)
Dept: CARDIOLOGY | Facility: CLINIC | Age: 83
End: 2023-12-21
Payer: COMMERCIAL

## 2023-12-21 VITALS
BODY MASS INDEX: 18.12 KG/M2 | WEIGHT: 96 LBS | OXYGEN SATURATION: 100 % | HEART RATE: 74 BPM | SYSTOLIC BLOOD PRESSURE: 184 MMHG | DIASTOLIC BLOOD PRESSURE: 64 MMHG | HEIGHT: 61 IN

## 2023-12-21 DIAGNOSIS — I49.49 PREMATURE BEATS: ICD-10-CM

## 2023-12-21 DIAGNOSIS — I10 ESSENTIAL HYPERTENSION WITH GOAL BLOOD PRESSURE LESS THAN 140/90: Primary | ICD-10-CM

## 2023-12-21 PROCEDURE — 99214 OFFICE O/P EST MOD 30 MIN: CPT | Performed by: PHYSICIAN ASSISTANT

## 2023-12-21 RX ORDER — ATENOLOL 50 MG/1
TABLET ORAL
Qty: 45 TABLET | Refills: 11 | Status: SHIPPED | OUTPATIENT
Start: 2023-12-21 | End: 2024-02-01

## 2023-12-21 NOTE — LETTER
12/21/2023    Saurabh Graza PA-C  [address]  [place] [address]    RE: Karen Sanchez       Dear Colleague,     I had the pleasure of seeing Karen Sanchez in the [facility].  [facility]    I had the pleasure of seeing Karen when she came for follow up of HTN.  This 83 year old saw Dr. 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    HTN      Last Visit & Interval History:  I saw Karen 10/2023 at which time she was tolerating low dose lisinopril, but BPs remained elevated. Her cuff at home was not accurate, unfortunately and I asked her get a new cuff and/or batteries placed with follow-up appt. Options for change in BP meds discussed (she refused amlodipine dosing as recommended by Dr. Jackson given her concern for sulfa allergy, despite confirmation of no interaction per multiple pharmacists). We discussed switching atenolol to carvedilol, watching palpitations closely, switch to the amlodipine previously recommended or start diuretic therapy (checking to see if indapamide was a concern with Sulfa allergy). She refused increasing ACEi dose.    On 11/17 she called concerned that lisinopril could be causing an allergy, as she had a Temp of 101-102 along with cough/hoarseness. UC follow-up recommended given BP med would not cause fever.    She called 12/11 noting she'd stopped lisinopril d/t runny nose, temp and sore throat as of 11/17  BPs on old machine continued 140-160/50s.     Today's Visit:  Overall, Karen is feeling well.  She denies recurrent palpitations and continues to feel atenolol is working adequately.  No issues with edema, orthopnea, PND.  No chest pain, pressure, tightness.      BPs at home with new BP cuff on L arm primarily have been 130s. She stopped the lisinopril 11/17. Runny nose, cough and fever have all resolved.  She was apparently told by a pharmacist that these could all be side effects of  "lisinopril.    Her BP cuff today (L wrist) showed 163/77, 30 points lower than what we got when I checked it with with manual cuff.    VITALS:  Vitals: BP (!) 184/64   Pulse 74   Ht 1.549 m (5' 1\")   Wt 43.5 kg (96 lb)   SpO2 100%   BMI 18.14 kg/m      Diagnostic Testing:  EKG 9/2023 showed SR 73 bpm. Nonspecific STTW changes  Echo 11/2022 showed  LVEF >70% with mild valve abnls. Mild PH RVSP 38+RAP  LE US 9/2018 showed <50% LE stenosis bilaterally   Stress Echocardiogam 12/2011 showed no stress-induced wall motion  Echo 8/2007 showed normal EF 55-60% and no significant valvular abnls  Component      Latest Ref Rng 9/5/2023  3:14 PM 11/8/2023  2:57 PM   Sodium      135 - 145 mmol/L 143  140    Potassium      3.4 - 5.3 mmol/L 4.2  4.0    Chloride      98 - 107 mmol/L 106  103    Carbon Dioxide (CO2)      22 - 29 mmol/L 25  27    Anion Gap      7 - 15 mmol/L 12  10    Urea Nitrogen      8.0 - 23.0 mg/dL 18.1  21.3    Creatinine      0.51 - 0.95 mg/dL 0.84  0.76    Calcium      8.8 - 10.2 mg/dL 9.5  9.5    Glucose      70 - 99 mg/dL 138 (H)  145 (H)    GFR Estimate      >60 mL/min/1.73m2 69  78          Plan:  Increase atenolol to 25 mg AM/50 mg PM      Assessment/Plan:    HTN  Remains on atenolol 50 mg daily at night  Based on BP check today, her cuff does not appear accurate, and she is getting 30 points lower than I did when I checked it manually x 2    With her new cuff, BPs are 130s (so likely closer to 160s).  She and her daughter Sallie remain very skeptical on changing medications to improve BP control given she has had side effects/has allergies    We again reviewed medication options, and she would like to try increasing atenolol.  I explained that this likely would not have too much of an effect on her BP, but she is not interested in starting carvedilol instead of atenolol, retrying lisinopril (as I stressed fever/runny nose would not be typical adverse reactions), or starting diuretic therapy such as " "indapamide.    PLAN:  Increase atenolol to 25 mg in AM and 50 mg in PM  Continue to monitor BP she will write down the actual numbers she gets on her machine, but we may have to add 30 points to this to make it more accurate, as she was 30 points lower than what we got in clinic x 2 today   Virtual visit 1 month given she is concerned about the weather.    2. Ectopy  PVCs and PACs had been sx'c and documented on EKG. Well controlled on atenolol  She's had to cut dose in 1/2 at times when she feels \"tired\" and sometimes notes brief palpitations  Updated echo 2022 with nl LVEF    PLAN:  Continue atenolol with dose adjustment for BP as above.  Will need to monitor for fatigue.      Tiera Remy PA-C, MSPAS      Orders Placed This Encounter   Procedures    Follow-Up with Cardiology TED     Orders Placed This Encounter   Medications    atenolol (TENORMIN) 50 MG tablet     Sig: Take 0.5 tablets (25 mg) by mouth every morning AND 1 tablet (50 mg) every evening.     Dispense:  45 tablet     Refill:  11     Keep on file until pt due for refills - note dose change     Medications Discontinued During This Encounter   Medication Reason    lisinopril (ZESTRIL) 5 MG tablet Stopped by Patient (No AVS)    atenolol (TENORMIN) 50 MG tablet Reorder (No AVS)           Encounter Diagnoses   Name Primary?    Premature beats     Essential hypertension with goal blood pressure less than 140/90 Yes           CURRENT MEDICATIONS:  Current Outpatient Medications   Medication Sig Dispense Refill    atenolol (TENORMIN) 50 MG tablet Take 0.5 tablets (25 mg) by mouth every morning AND 1 tablet (50 mg) every evening. 45 tablet 11    Calcium Carbonate-Vitamin D (CALCIUM + D PO) Take  by mouth.      Cholecalciferol (VITAMIN D PO)          ALLERGIES     Allergies   Allergen Reactions    Sulfa Antibiotics Rash    Hctz      Cough, dehydration, CROSS-REACTION with sulfa allergy      Darvon [Propoxyphene Hcl] Other (See Comments)     Stiff neck   " "        Review of Systems:  Skin:        Eyes:       ENT:       Respiratory:  Negative    Cardiovascular:  Negative for;chest pain;palpitations;fatigue;lightheadedness;dizziness;syncope or near-syncope Positive for;edema  Gastroenterology:      Genitourinary:       Musculoskeletal:       Neurologic:       Psychiatric:       Heme/Lymph/Imm:  Negative    Endocrine:  Negative      Physical Exam:  Vitals: BP (!) 184/64   Pulse 74   Ht 1.549 m (5' 1\")   Wt 43.5 kg (96 lb)   SpO2 100%   BMI 18.14 kg/m      Constitutional:  cooperative, alert and oriented, well developed, well nourished, in no acute distress        Skin:  warm and dry to the touch, no apparent skin lesions or masses noted        Head:  normocephalic, no masses or lesions        Eyes:  pupils equal and round;conjunctivae and lids unremarkable;sclera white        ENT:  no pallor or cyanosis, dentition good        Neck:  not assessed this visit        Chest:  not assessed this visit        Cardiac: no murmurs, gallops or rubs detected                  Abdomen:  abdomen soft, non-tender, BS normoactive, no mass, no HSM, no bruits        Vascular: not assessed this visit                                      Extremities and Back:  no deformities, clubbing, cyanosis, erythema observed;no edema        Neurological:  no gross motor deficits            PAST MEDICAL HISTORY:  Past Medical History:   Diagnosis Date    Arm fracture, right 12/11    Essential hypertension, benign     Hypertension     PAC (premature atrial contraction)     Pneumonia 1988    PVC (premature ventricular contraction)     Recurrent Otitis media        PAST SURGICAL HISTORY:  Past Surgical History:   Procedure Laterality Date    OPEN REDUCTION INTERNAL FIXATION HUMERUS PROXIMAL  12/27/2011    Procedure:OPEN REDUCTION INTERNAL FIXATION HUMERUS PROXIMAL; OPEN REDUCTION INTERNAL FIXATION RIGHT PROXIMAL HUMERUS ; Surgeon:OCTAVIO WRIGHT; Location:[facility] OR    TONSILLECTOMY  as child "       FAMILY HISTORY:  Family History   Problem Relation Age of Onset    Cerebrovascular Disease Mother         age 83    Cerebrovascular Disease Father         age  82       SOCIAL HISTORY:  Social History     Socioeconomic History    Marital status:      Spouse name: None    Number of children: None    Years of education: None    Highest education level: None   Occupational History    Occupation: retired   Tobacco Use    Smoking status: Never    Smokeless tobacco: Never   Substance and Sexual Activity    Alcohol use: No    Drug use: No    Sexual activity: Not Currently     Partners: Male               Thank you for allowing me to participate in the care of your patient.      Sincerely,     MAURO Bianchi [facility]  cc:   No referring provider defined for this encounter.

## 2023-12-21 NOTE — PATIENT INSTRUCTIONS
Karen - it was good to see you and meet Sallie today.     Your BP cuff is reading ~30 points lower than we got in clinic  Reviewed issues with lisinopril. Cough could certainly be connected to lisinopril use, but given the fever and runny nose that were associated, more likely due to a virus.    PLAN:  Increase atenolol to 1/2 tab in AM and 1 tab in PM (25 mg in AM, 50 mg in PM_  Write down BPs (that you see) daily until we have our next meeting - take at least 1 hour after morning atenolol      [phone]

## 2024-01-31 NOTE — PROGRESS NOTES
"Karen Sanchez is a 83 year old female who is being evaluated via a billable telephone visit.      What phone number would you like to be contacted at? [phone]   How would you like to obtain your AVS? Mail a copy    CC:  HTN    VITALS:  149/63 HR 60  128/59 HR 66  144/70 HR 70  140/70 HR 64 this AM      BRIEF HPI:  Karen is an 83 year old yo F who saw Dr. Jackson for h/o:    1. 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 HTN  - very resistant to trying different medications    LAST VISIT & INTERVAL HISTORY:  I saw Karen 12/21/2023 at which time she was feeling well. BPs on home cff were running 130s, but 160s on our cuff (she brought hers in to compare and hers was 30 points lower than ours). She had stopped lisinopril d/t runny nose, cough and fever. She and her daughter Sallie were very hesitant to change medications given concerns for side effects.  She acquiesced to increasing atenolol to 25 mgAM/50mgPM despite cutting her original dose in 1/2 at times d/t feeling \"tired.\"    TODAY'S VISIT:   BPs at home (actual #s) have been running 120-140s at home, and she's not had issues with increased atenolol dosing.    Denies any issues with change in LE edema, orthopnea, PND. No CP, SOB.  She is eager to get \"out and about\" now that the sun is shining.      DIAGNOSTICS:  EKG 9/2023 showed SR 73 bpm. Nonspecific STTW changes  Echo 11/2022 showed  LVEF >70% with mild valve abnls. Mild PH RVSP 38+RAP  LE US 9/2018 showed <50% LE stenosis bilaterally   Stress Echocardiogam 12/2011 showed no stress-induced wall motion  Echo 8/2007 showed normal EF 55-60% and no significant valvular abnls    REVIEW OF SYSTEMS:  NEGATIVE with the exception of that noted above.    PHYSICAL EXAM:  149/63 HR 60  128/59 HR 66  144/70 HR 70  140/70 HR 64 this AM    Deferred, telephone    PLAN:  Increase atenolol 50 mg twice daily  See me back 2/22/2024 as planned    ASSESSMENT/PLAN:    1. HTN  Currently on increased atenolol 50AM/25PM and " "BPs do look a little better  She felt fever/runny nose were d/t lisinopril allergy and was uninterested in trying the amlodipine Dr. Jackson had previously recommended d/t her concerns she would not tolerate it d/t her Sulfa allergy (though confirmed with 2 pharmacists, [facility] and [facility] that this would this would not be an issue). Did not want to try indapamide.  She continues to be very hesitant to start new medications     PLAN:  Increase atenolol to 50 mg BID. New Rx sent in and asked to keep on file  See me back 2/22/2024 as planned    2. Ectopy  Had been well controlled on atenolol, but she'd had to cut the dose in 1/2 at times d/t feeling \"tired.\"  On higher dose, she's actually done pretty well     PLAN:  See me back 2/22/2024 as planned         CURRENT MEDICATIONS:  Current Outpatient Medications   Medication Sig Dispense Refill    atenolol (TENORMIN) 50 MG tablet Take 1 tablet (50 mg) by mouth 2 times daily Keep on file until pt due for refills - she's going to use up her current tablets 180 tablet 3    Calcium Carbonate-Vitamin D (CALCIUM + D PO) Take  by mouth.      Cholecalciferol (VITAMIN D PO)            ORDERS PLACED:  No orders of the defined types were placed in this encounter.    Orders Placed This Encounter   Medications    atenolol (TENORMIN) 50 MG tablet     Sig: Take 1 tablet (50 mg) by mouth 2 times daily Keep on file until pt due for refills - she's going to use up her current tablets     Dispense:  180 tablet     Refill:  3     Medications Discontinued During This Encounter   Medication Reason    atenolol (TENORMIN) 50 MG tablet          Encounter Diagnoses   Name Primary?    Premature beats     Essential hypertension with goal blood pressure less than 140/90          ALLERGIES     Allergies   Allergen Reactions    Sulfa Antibiotics Rash    Hctz      Cough, dehydration, CROSS-REACTION with sulfa allergy      Darvon [Propoxyphene Hcl] Other (See Comments)     Stiff neck         PAST " MEDICAL HISTORY:  Past Medical History:   Diagnosis Date    Arm fracture, right 12/11    Essential hypertension, benign     Hypertension     PAC (premature atrial contraction)     Pneumonia 1988    PVC (premature ventricular contraction)     Recurrent Otitis media        PAST SURGICAL HISTORY:  Past Surgical History:   Procedure Laterality Date    OPEN REDUCTION INTERNAL FIXATION HUMERUS PROXIMAL  12/27/2011    Procedure:OPEN REDUCTION INTERNAL FIXATION HUMERUS PROXIMAL; OPEN REDUCTION INTERNAL FIXATION RIGHT PROXIMAL HUMERUS ; Surgeon:OCTAVIO WRIGHT; Location:[facility] OR    TONSILLECTOMY  as child       FAMILY HISTORY:  Family History   Problem Relation Age of Onset    Cerebrovascular Disease Mother         age 83    Cerebrovascular Disease Father         age  82       SOCIAL HISTORY:  Social History     Socioeconomic History    Marital status:    Occupational History    Occupation: retired   Tobacco Use    Smoking status: Never    Smokeless tobacco: Never   Substance and Sexual Activity    Alcohol use: No    Drug use: No    Sexual activity: Not Currently     Partners: Male       I have reviewed the note as documented above.  This accurately captures the substance of my conversation with the patient.     Phone call contact time  Call Started at 1244  Call Ended at 1256  Duration 12 minutes    MAURO TroyAS

## 2024-02-01 ENCOUNTER — VIRTUAL VISIT (OUTPATIENT)
Dept: CARDIOLOGY | Facility: CLINIC | Age: 84
End: 2024-02-01
Payer: COMMERCIAL

## 2024-02-01 DIAGNOSIS — I10 ESSENTIAL HYPERTENSION WITH GOAL BLOOD PRESSURE LESS THAN 140/90: ICD-10-CM

## 2024-02-01 DIAGNOSIS — I49.49 PREMATURE BEATS: ICD-10-CM

## 2024-02-01 PROCEDURE — 99442 PR PHYSICIAN TELEPHONE EVALUATION 11-20 MIN: CPT | Mod: 93 | Performed by: PHYSICIAN ASSISTANT

## 2024-02-01 RX ORDER — ATENOLOL 50 MG/1
50 TABLET ORAL 2 TIMES DAILY
Qty: 180 TABLET | Refills: 3 | Status: SHIPPED | OUTPATIENT
Start: 2024-02-01 | End: 2024-02-22

## 2024-02-01 NOTE — PATIENT INSTRUCTIONS
Karen - it was nice to speak with you today!    1.  I am glad you are feeling okay on the increased dose of atenolol  2.  Discussed that your BP is better on higher dose of atenolol, but still not where we want them      PLAN:  Increase atenolol to 50 mg twice daily -I asked the pharmacy to keep this on file for you, and not fill it yet as you will just take 2 of your current tablets a day  Keep appointment with me 2/22/2024 @ 1:30 and we'll see if you need any additional adjustments   CALL if issues! [phone]

## 2024-02-01 NOTE — LETTER
"2/1/2024    Saurabh Garza PA-C  [address]  [place] [address]    RE: Karen Sanchez       Dear Colleague,     I had the pleasure of seeing Karen Sanchez in the [facility].  Karen Sanchez is a 83 year old female who is being evaluated via a billable telephone visit.      What phone number would you like to be contacted at? [phone]   How would you like to obtain your AVS? Mail a copy    CC:  HTN    VITALS:  149/63 HR 60  128/59 HR 66  144/70 HR 70  140/70 HR 64 this AM      BRIEF HPI:  Karen is an 83 year old yo F who saw Dr. Jackson for h/o:    1. 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 HTN  - very resistant to trying different medications    LAST VISIT & INTERVAL HISTORY:  I saw Karen 12/21/2023 at which time she was feeling well. BPs on home cff were running 130s, but 160s on our cuff (she brought hers in to compare and hers was 30 points lower than ours). She had stopped lisinopril d/t runny nose, cough and fever. She and her daughter Sallie were very hesitant to change medications given concerns for side effects.  She acquiesced to increasing atenolol to 25 mgAM/50mgPM despite cutting her original dose in 1/2 at times d/t feeling \"tired.\"    TODAY'S VISIT:   BPs at home (actual #s) have been running 120-140s at home, and she's not had issues with increased atenolol dosing.    Denies any issues with change in LE edema, orthopnea, PND. No CP, SOB.  She is eager to get \"out and about\" now that the sun is shining.      DIAGNOSTICS:  EKG 9/2023 showed SR 73 bpm. Nonspecific STTW changes  Echo 11/2022 showed  LVEF >70% with mild valve abnls. Mild PH RVSP 38+RAP  LE US 9/2018 showed <50% LE stenosis bilaterally   Stress Echocardiogam 12/2011 showed no stress-induced wall motion  Echo 8/2007 showed normal EF 55-60% and no significant valvular abnls    REVIEW OF SYSTEMS:  NEGATIVE with the exception of that noted above.    PHYSICAL EXAM:  149/63 HR 60  128/59 " "HR 66  144/70 HR 70  140/70 HR 64 this AM    Deferred, telephone    PLAN:  Increase atenolol 50 mg twice daily  See me back 2/22/2024 as planned    ASSESSMENT/PLAN:    1. HTN  Currently on increased atenolol 50AM/25PM and BPs do look a little better  She felt fever/runny nose were d/t lisinopril allergy and was uninterested in trying the amlodipine Dr. Jackson had previously recommended d/t her concerns she would not tolerate it d/t her Sulfa allergy (though confirmed with 2 pharmacists, [facility] and [facility] that this would this would not be an issue). Did not want to try indapamide.  She continues to be very hesitant to start new medications     PLAN:  Increase atenolol to 50 mg BID. New Rx sent in and asked to keep on file  See me back 2/22/2024 as planned    2. Ectopy  Had been well controlled on atenolol, but she'd had to cut the dose in 1/2 at times d/t feeling \"tired.\"  On higher dose, she's actually done pretty well     PLAN:  See me back 2/22/2024 as planned         CURRENT MEDICATIONS:  Current Outpatient Medications   Medication Sig Dispense Refill    atenolol (TENORMIN) 50 MG tablet Take 1 tablet (50 mg) by mouth 2 times daily Keep on file until pt due for refills - she's going to use up her current tablets 180 tablet 3    Calcium Carbonate-Vitamin D (CALCIUM + D PO) Take  by mouth.      Cholecalciferol (VITAMIN D PO)            ORDERS PLACED:  No orders of the defined types were placed in this encounter.    Orders Placed This Encounter   Medications    atenolol (TENORMIN) 50 MG tablet     Sig: Take 1 tablet (50 mg) by mouth 2 times daily Keep on file until pt due for refills - she's going to use up her current tablets     Dispense:  180 tablet     Refill:  3     Medications Discontinued During This Encounter   Medication Reason    atenolol (TENORMIN) 50 MG tablet          Encounter Diagnoses   Name Primary?    Premature beats     Essential hypertension with goal blood pressure less than 140/90  "         ALLERGIES     Allergies   Allergen Reactions    Sulfa Antibiotics Rash    Hctz      Cough, dehydration, CROSS-REACTION with sulfa allergy      Darvon [Propoxyphene Hcl] Other (See Comments)     Stiff neck         PAST MEDICAL HISTORY:  Past Medical History:   Diagnosis Date    Arm fracture, right 12/11    Essential hypertension, benign     Hypertension     PAC (premature atrial contraction)     Pneumonia 1988    PVC (premature ventricular contraction)     Recurrent Otitis media        PAST SURGICAL HISTORY:  Past Surgical History:   Procedure Laterality Date    OPEN REDUCTION INTERNAL FIXATION HUMERUS PROXIMAL  12/27/2011    Procedure:OPEN REDUCTION INTERNAL FIXATION HUMERUS PROXIMAL; OPEN REDUCTION INTERNAL FIXATION RIGHT PROXIMAL HUMERUS ; Surgeon:OCTAVIO WRIGHT; Location:[facility] OR    TONSILLECTOMY  as child       FAMILY HISTORY:  Family History   Problem Relation Age of Onset    Cerebrovascular Disease Mother         age 83    Cerebrovascular Disease Father         age  82       SOCIAL HISTORY:  Social History     Socioeconomic History    Marital status:    Occupational History    Occupation: retired   Tobacco Use    Smoking status: Never    Smokeless tobacco: Never   Substance and Sexual Activity    Alcohol use: No    Drug use: No    Sexual activity: Not Currently     Partners: Male       I have reviewed the note as documented above.  This accurately captures the substance of my conversation with the patient.     Phone call contact time  Call Started at 1244  Call Ended at 1256  Duration 12 minutes    Saundra Remy PA-C [facility]      Thank you for allowing me to participate in the care of your patient.      Sincerely,     Saundra Remy PA-C     [facility]  cc:   Saundra Remy PA-C  [address]  [place] [address]

## 2024-02-21 NOTE — PROGRESS NOTES
"[facility]    I had the pleasure of seeing Karen when she came for follow up of HTN.  This 83 year old saw Dr. 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    HTN      Last Visit & Interval History:  I had a virtual visit with Karen 2/1/2024 at which time she felt things were going well on her increased atenolol, with no additional fatigue. Her BPs at home were running 120-140s (cuff found to be reading 30 mmHg lower than ours) and she agreed to increase atenolol further to 50 mg BID. She remained hesitant to try any other medication, feeling she would not be able to tolerate amlodipine d/t her sulfa allergy and had stopped lisinopril b/c she developed a fever and runny nose, which she felt was related.    Today's Visit:  Feels her new BP cuff gives more \"error codes\" than her old one and with increase in atenolol notes more fatigue. She had one episode of rapid HR and \"didn't feel well.\" No specific CP, lightheadedness, dizziness, palpitations associated, just \"didn't feel well.\"    She has not been as active as he would like to be despite the nicer weather.  [place] issues, and has no problems with ADLs.  Denies chest pain, pressure, tightness.    Karen Rizo and I had a long discussion about her BP and medication options.    VITALS:  Vitals: BP (!) 174/52   Pulse 72   Ht 1.549 m (5' 1\")   Wt 44.4 kg (97 lb 12.8 oz)   SpO2 98%   BMI 18.48 kg/m      Diagnostic Testing:  EKG 9/2023 showed SR 73 bpm. Nonspecific STTW changes  Echo 11/2022 showed  LVEF >70% with mild valve abnls. Mild PH RVSP 38+RAP  LE US 9/2018 showed <50% LE stenosis bilaterally   Stress Echocardiogam 12/2011 showed no stress-induced wall motion  Echo 8/2007 showed normal EF 55-60% and no significant valvular abnls  Component      Latest Ref Rng 9/5/2023  3:14 PM 11/8/2023  2:57 PM   Sodium      135 - 145 mmol/L 143  140    Potassium      3.4 - 5.3 mmol/L " "4.2  4.0    Chloride      98 - 107 mmol/L 106  103    Carbon Dioxide (CO2)      22 - 29 mmol/L 25  27    Anion Gap      7 - 15 mmol/L 12  10    Urea Nitrogen      8.0 - 23.0 mg/dL 18.1  21.3    Creatinine      0.51 - 0.95 mg/dL 0.84  0.76    Calcium      8.8 - 10.2 mg/dL 9.5  9.5    Glucose      70 - 99 mg/dL 138 (H)  145 (H)    GFR Estimate      >60 mL/min/1.73m2 69  78          Plan:  Stop atenolol on Monday  On Monday, start carvedilol 6.25 mg BID.  On Thursday 2/29, increase carvedilol to 12.5 mg BID  RN visit in 3-4 weeks to assess BP on carvedilol.  At that time, we will also check her new cuff (she is buying a new 1 today given the error code she is getting on her current cuff) to see if this is accurate.    Assessment/Plan:    HTN  Currently on atenolol 50 mg BID.  With this, she is getting more fatigued  Her cuff was not brought today to check accuracy    BPs at home are 120-150s (noted to be 30 points lower than our readings, so likely 150-180s)    We again reviewed medication options, especially as BP did not improve dramatically on higher doses of atenolol and she is now getting side effects of fatigue    PLAN:  After long discussion, she will start carvedilol in lieu of atenolol.  Given the upcoming weekend, I have requested that she hold off on making any changes until Monday.  On Monday, she will stop atenolol and start carvedilol.  I have given her a 12.5 mg tablet and asked her to start with 6.25 mg BID x 3 days, then increase to 12.5 mg daily  RN visit in 3 weeks to assess accuracy of her new cuff and what BPs are running on carvedilol    2. Ectopy  PVCs and PACs had been sx'c and documented on EKG. Well controlled on atenolol  She's had to cut dose in 1/2 at times when she feels \"tired\" and sometimes notes brief palpitations  Updated echo 2022 with nl LVEF    PLAN:  Switch atenolol to carvedilol as above      Tiera Remy PA-C, MSPAS      Orders Placed This Encounter   Procedures    Follow-Up with " "Cardiology Nurse     Orders Placed This Encounter   Medications    DISCONTD: carvedilol (COREG) 12.5 MG tablet     Sig: Take 1 tablet (12.5 mg) by mouth 2 times daily (with meals)     Dispense:  60 tablet     Refill:  5     Replaces atenolol    carvedilol (COREG) 12.5 MG tablet     Sig: Take 1 tablet (12.5 mg) by mouth 2 times daily (with meals)     Dispense:  60 tablet     Refill:  5     Replaces atenolol starting MONDAY 2/26     Medications Discontinued During This Encounter   Medication Reason    atenolol (TENORMIN) 50 MG tablet     carvedilol (COREG) 12.5 MG tablet              Encounter Diagnosis   Name Primary?    Essential hypertension with goal blood pressure less than 140/90              CURRENT MEDICATIONS:  Current Outpatient Medications   Medication Sig Dispense Refill    Calcium Carbonate-Vitamin D (CALCIUM + D PO) Take by mouth twice a week      [START ON 2/26/2024] carvedilol (COREG) 12.5 MG tablet Take 1 tablet (12.5 mg) by mouth 2 times daily (with meals) 60 tablet 5    Cholecalciferol (VITAMIN D PO) Take by mouth twice a week         ALLERGIES     Allergies   Allergen Reactions    Sulfa Antibiotics Rash    Hctz      Cough, dehydration, CROSS-REACTION with sulfa allergy      Darvon [Propoxyphene Hcl] Other (See Comments)     Stiff neck           Review of Systems:  Skin:  Negative     Eyes:  Negative    ENT:  Negative    Respiratory:  Negative for shortness of breath;dyspnea on exertion;cough  Cardiovascular:  Negative for;chest pain;palpitations;fatigue;lightheadedness;dizziness;syncope or near-syncope Positive for;edema  Gastroenterology: Negative    Genitourinary:  Negative    Musculoskeletal:       Neurologic:  Negative    Psychiatric:  Negative    Heme/Lymph/Imm:  Negative    Endocrine:  Negative      Physical Exam:  Vitals: BP (!) 174/52   Pulse 72   Ht 1.549 m (5' 1\")   Wt 44.4 kg (97 lb 12.8 oz)   SpO2 98%   BMI 18.48 kg/m      Constitutional:  cooperative, alert and oriented, well " developed, well nourished, in no acute distress        Skin:  warm and dry to the touch, no apparent skin lesions or masses noted        Head:  normocephalic, no masses or lesions        Eyes:  pupils equal and round;conjunctivae and lids unremarkable;sclera white        ENT:  no pallor or cyanosis, dentition good        Neck:  not assessed this visit        Chest:  not assessed this visit        Cardiac:                    Abdomen:  not assessed this visit        Vascular: not assessed this visit                                      Extremities and Back:  not assessed this visit        Neurological:  no gross motor deficits            PAST MEDICAL HISTORY:  Past Medical History:   Diagnosis Date    Arm fracture, right 12/11    Essential hypertension, benign     Hypertension     PAC (premature atrial contraction)     Pneumonia 1988    PVC (premature ventricular contraction)     Recurrent Otitis media        PAST SURGICAL HISTORY:  Past Surgical History:   Procedure Laterality Date    OPEN REDUCTION INTERNAL FIXATION HUMERUS PROXIMAL  12/27/2011    Procedure:OPEN REDUCTION INTERNAL FIXATION HUMERUS PROXIMAL; OPEN REDUCTION INTERNAL FIXATION RIGHT PROXIMAL HUMERUS ; Surgeon:OCTAVIO WRIGHT; Location:[facility] OR    TONSILLECTOMY  as child       FAMILY HISTORY:  Family History   Problem Relation Age of Onset    Cerebrovascular Disease Mother         age 83    Cerebrovascular Disease Father         age  82       SOCIAL HISTORY:  Social History     Socioeconomic History    Marital status:      Spouse name: None    Number of children: None    Years of education: None    Highest education level: None   Occupational History    Occupation: retired   Tobacco Use    Smoking status: Never    Smokeless tobacco: Never   Substance and Sexual Activity    Alcohol use: No    Drug use: No    Sexual activity: Not Currently     Partners: Male

## 2024-02-22 ENCOUNTER — OFFICE VISIT (OUTPATIENT)
Dept: CARDIOLOGY | Facility: CLINIC | Age: 84
End: 2024-02-22
Attending: PHYSICIAN ASSISTANT
Payer: COMMERCIAL

## 2024-02-22 VITALS
DIASTOLIC BLOOD PRESSURE: 52 MMHG | SYSTOLIC BLOOD PRESSURE: 174 MMHG | HEIGHT: 61 IN | HEART RATE: 72 BPM | OXYGEN SATURATION: 98 % | WEIGHT: 97.8 LBS | BODY MASS INDEX: 18.46 KG/M2

## 2024-02-22 DIAGNOSIS — I10 ESSENTIAL HYPERTENSION WITH GOAL BLOOD PRESSURE LESS THAN 140/90: ICD-10-CM

## 2024-02-22 PROCEDURE — 99214 OFFICE O/P EST MOD 30 MIN: CPT | Performed by: PHYSICIAN ASSISTANT

## 2024-02-22 RX ORDER — CARVEDILOL 12.5 MG/1
12.5 TABLET ORAL 2 TIMES DAILY WITH MEALS
Qty: 60 TABLET | Refills: 5 | Status: SHIPPED | OUTPATIENT
Start: 2024-02-22 | End: 2024-02-22

## 2024-02-22 RX ORDER — CARVEDILOL 12.5 MG/1
12.5 TABLET ORAL 2 TIMES DAILY WITH MEALS
Qty: 60 TABLET | Refills: 5 | Status: SHIPPED | OUTPATIENT
Start: 2024-02-26 | End: 2024-02-27 | Stop reason: ALTCHOICE

## 2024-02-22 NOTE — PATIENT INSTRUCTIONS
Karen - it was good to see you and Sallie today!    Reviewed that BPs are too high and now more fatigue with the atenolol dose increasing    PLAN:  On Monday, please STOP atenolol 50 mg twice a day and START carvedilol. 12.5 mg tablets but start with just 1/2 tab (6.25 mg) twice a day through Wednesday  On Thursday 2/29, increase carvedilol to full 12.5 mg tablet twice a day   Keep checking BP  RN visit 3-4 weeks to assess BP. BRING NEW BP CUFF IN to assess accuracy    [phone]

## 2024-02-22 NOTE — LETTER
"2/22/2024    Saurabh Garza PA-C  [address]  [place] [address]    RE: Karen Sanchez       Dear Colleague,     I had the pleasure of seeing Karen Sanchez in the [facility].  [facility]    I had the pleasure of seeing Karen when she came for follow up of HTN.  This 83 year old saw Dr. 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    HTN      Last Visit & Interval History:  I had a virtual visit with Karen 2/1/2024 at which time she felt things were going well on her increased atenolol, with no additional fatigue. Her BPs at home were running 120-140s (cuff found to be reading 30 mmHg lower than ours) and she agreed to increase atenolol further to 50 mg BID. She remained hesitant to try any other medication, feeling she would not be able to tolerate amlodipine d/t her sulfa allergy and had stopped lisinopril b/c she developed a fever and runny nose, which she felt was related.    Today's Visit:  Feels her new BP cuff gives more \"error codes\" than her old one and with increase in atenolol notes more fatigue. She had one episode of rapid HR and \"didn't feel well.\" No specific CP, lightheadedness, dizziness, palpitations associated, just \"didn't feel well.\"    She has not been as active as he would like to be despite the nicer weather.  [place] issues, and has no problems with ADLs.  Denies chest pain, pressure, tightness.    Karen Rizo and I had a long discussion about her BP and medication options.    VITALS:  Vitals: BP (!) 174/52   Pulse 72   Ht 1.549 m (5' 1\")   Wt 44.4 kg (97 lb 12.8 oz)   SpO2 98%   BMI 18.48 kg/m      Diagnostic Testing:  EKG 9/2023 showed SR 73 bpm. Nonspecific STTW changes  Echo 11/2022 showed  LVEF >70% with mild valve abnls. Mild PH RVSP 38+RAP  LE US 9/2018 showed <50% LE stenosis bilaterally   Stress Echocardiogam 12/2011 showed no stress-induced " wall motion  Echo 8/2007 showed normal EF 55-60% and no significant valvular abnls  Component      Latest Ref Rng 9/5/2023  3:14 PM 11/8/2023  2:57 PM   Sodium      135 - 145 mmol/L 143  140    Potassium      3.4 - 5.3 mmol/L 4.2  4.0    Chloride      98 - 107 mmol/L 106  103    Carbon Dioxide (CO2)      22 - 29 mmol/L 25  27    Anion Gap      7 - 15 mmol/L 12  10    Urea Nitrogen      8.0 - 23.0 mg/dL 18.1  21.3    Creatinine      0.51 - 0.95 mg/dL 0.84  0.76    Calcium      8.8 - 10.2 mg/dL 9.5  9.5    Glucose      70 - 99 mg/dL 138 (H)  145 (H)    GFR Estimate      >60 mL/min/1.73m2 69  78          Plan:  Stop atenolol on Monday  On Monday, start carvedilol 6.25 mg BID.  On Thursday 2/29, increase carvedilol to 12.5 mg BID  RN visit in 3-4 weeks to assess BP on carvedilol.  At that time, we will also check her new cuff (she is buying a new 1 today given the error code she is getting on her current cuff) to see if this is accurate.    Assessment/Plan:    HTN  Currently on atenolol 50 mg BID.  With this, she is getting more fatigued  Her cuff was not brought today to check accuracy    BPs at home are 120-150s (noted to be 30 points lower than our readings, so likely 150-180s)    We again reviewed medication options, especially as BP did not improve dramatically on higher doses of atenolol and she is now getting side effects of fatigue    PLAN:  After long discussion, she will start carvedilol in lieu of atenolol.  Given the upcoming weekend, I have requested that she hold off on making any changes until Monday.  On Monday, she will stop atenolol and start carvedilol.  I have given her a 12.5 mg tablet and asked her to start with 6.25 mg BID x 3 days, then increase to 12.5 mg daily  RN visit in 3 weeks to assess accuracy of her new cuff and what BPs are running on carvedilol    2. Ectopy  PVCs and PACs had been sx'c and documented on EKG. Well controlled on atenolol  She's had to cut dose in 1/2 at times when she  "feels \"tired\" and sometimes notes brief palpitations  Updated echo 2022 with nl LVEF    PLAN:  Switch atenolol to carvedilol as above      Tiera Remy PA-C, MSPAS      Orders Placed This Encounter   Procedures    Follow-Up with Cardiology Nurse     Orders Placed This Encounter   Medications    DISCONTD: carvedilol (COREG) 12.5 MG tablet     Sig: Take 1 tablet (12.5 mg) by mouth 2 times daily (with meals)     Dispense:  60 tablet     Refill:  5     Replaces atenolol    carvedilol (COREG) 12.5 MG tablet     Sig: Take 1 tablet (12.5 mg) by mouth 2 times daily (with meals)     Dispense:  60 tablet     Refill:  5     Replaces atenolol starting MONDAY 2/26     Medications Discontinued During This Encounter   Medication Reason    atenolol (TENORMIN) 50 MG tablet     carvedilol (COREG) 12.5 MG tablet              Encounter Diagnosis   Name Primary?    Essential hypertension with goal blood pressure less than 140/90              CURRENT MEDICATIONS:  Current Outpatient Medications   Medication Sig Dispense Refill    Calcium Carbonate-Vitamin D (CALCIUM + D PO) Take by mouth twice a week      [START ON 2/26/2024] carvedilol (COREG) 12.5 MG tablet Take 1 tablet (12.5 mg) by mouth 2 times daily (with meals) 60 tablet 5    Cholecalciferol (VITAMIN D PO) Take by mouth twice a week         ALLERGIES     Allergies   Allergen Reactions    Sulfa Antibiotics Rash    Hctz      Cough, dehydration, CROSS-REACTION with sulfa allergy      Darvon [Propoxyphene Hcl] Other (See Comments)     Stiff neck           Review of Systems:  Skin:  Negative     Eyes:  Negative    ENT:  Negative    Respiratory:  Negative for shortness of breath;dyspnea on exertion;cough  Cardiovascular:  Negative for;chest pain;palpitations;fatigue;lightheadedness;dizziness;syncope or near-syncope Positive for;edema  Gastroenterology: Negative    Genitourinary:  Negative    Musculoskeletal:       Neurologic:  Negative    Psychiatric:  Negative    Heme/Lymph/Imm:  " "Negative    Endocrine:  Negative      Physical Exam:  Vitals: BP (!) 174/52   Pulse 72   Ht 1.549 m (5' 1\")   Wt 44.4 kg (97 lb 12.8 oz)   SpO2 98%   BMI 18.48 kg/m      Constitutional:  cooperative, alert and oriented, well developed, well nourished, in no acute distress        Skin:  warm and dry to the touch, no apparent skin lesions or masses noted        Head:  normocephalic, no masses or lesions        Eyes:  pupils equal and round;conjunctivae and lids unremarkable;sclera white        ENT:  no pallor or cyanosis, dentition good        Neck:  not assessed this visit        Chest:  not assessed this visit        Cardiac:                    Abdomen:  not assessed this visit        Vascular: not assessed this visit                                      Extremities and Back:  not assessed this visit        Neurological:  no gross motor deficits            PAST MEDICAL HISTORY:  Past Medical History:   Diagnosis Date    Arm fracture, right 12/11    Essential hypertension, benign     Hypertension     PAC (premature atrial contraction)     Pneumonia 1988    PVC (premature ventricular contraction)     Recurrent Otitis media        PAST SURGICAL HISTORY:  Past Surgical History:   Procedure Laterality Date    OPEN REDUCTION INTERNAL FIXATION HUMERUS PROXIMAL  12/27/2011    Procedure:OPEN REDUCTION INTERNAL FIXATION HUMERUS PROXIMAL; OPEN REDUCTION INTERNAL FIXATION RIGHT PROXIMAL HUMERUS ; Surgeon:OCTAVIO WRIGHT; Location:[facility] OR    TONSILLECTOMY  as child       FAMILY HISTORY:  Family History   Problem Relation Age of Onset    Cerebrovascular Disease Mother         age 83    Cerebrovascular Disease Father         age  82       SOCIAL HISTORY:  Social History     Socioeconomic History    Marital status:      Spouse name: None    Number of children: None    Years of education: None    Highest education level: None   Occupational History    Occupation: retired   Tobacco Use    Smoking status: Never    " Smokeless tobacco: Never   Substance and Sexual Activity    Alcohol use: No    Drug use: No    Sexual activity: Not Currently     Partners: Male               Thank you for allowing me to participate in the care of your patient.      Sincerely,     Saundra Remy PA-C     [facility]  cc:   Saundra Remy PA-C  [address]  [place] [address]

## 2024-02-27 ENCOUNTER — TELEPHONE (OUTPATIENT)
Dept: CARDIOLOGY | Facility: CLINIC | Age: 84
End: 2024-02-27
Payer: COMMERCIAL

## 2024-02-27 DIAGNOSIS — I10 ESSENTIAL HYPERTENSION WITH GOAL BLOOD PRESSURE LESS THAN 140/90: Primary | ICD-10-CM

## 2024-02-27 RX ORDER — ATENOLOL 50 MG/1
50 TABLET ORAL 2 TIMES DAILY
Qty: 60 TABLET | Refills: 0 | Status: SHIPPED | OUTPATIENT
Start: 2024-02-27 | End: 2024-03-14

## 2024-02-27 NOTE — TELEPHONE ENCOUNTER
Spoke to pt and made her aware that Tiera wanted to make sure that she is aware of her that with her elevated bp that this increases her risk of stroke and pt states that she is aware of that.  But pt when she picked up the Carvedilol the paperwork said the she needed to be monitored after starting and pt states that she lives alone.  Discussed with pt that this is with any medication that can lower her BP and HR and the monitoring is done by doing BP's to see if they are getting to low or if she is lightheaded.  Pt continued to think about it and would like to continue her Atenolol at this time and when she has her BP done and if still elevated, pt will start the Carvedilol. Pt will need a refill of her Atenolol at this time to get her until 3/14. Will update Tiera. Angela

## 2024-02-27 NOTE — TELEPHONE ENCOUNTER
Thanks for update. Pls update EPIC med list with atenolol dose she's planning to restart (I think was 25 mg in AM and 50 mg PM or possibly 50 mg BID).    What does she want to do for BP instead?  She was planning to get a new BP cuff, as her old cuff was reading 30 points lower than what we were getting in our visits, so likely 150-180s at home.  She has been very hesitant to start medications due to potential side effects.    1) retry lisinopril 5 mg daily.  She had tried this, got a fever and a runny nose and she was concerned this was a medication allergy.   If she tries this, would please get BMP when she comes in for RN visit for BP 3/14 ... Please order    2) try amlodipine 5 mg daily.  Dr. Jackson had recommended this years ago, but she was hesitant to take it due to potential sulfa interaction.    3)  Indapamide 12.5 mg daily in AM (diuretic to help reduce water volume).    If she tries this, would please get BMP when she comes in for RN visit for BP 3/14  Please order.      Regardless of what she starts instead, please have her get BP check with RN 3/14 and bring her BP cuff to check accuracy.    Thanks-Tiera  February 27, 2024 at 12:03 PM

## 2024-02-27 NOTE — TELEPHONE ENCOUNTER
Pt called in as today she was to start taking Carvedilol instead of Atenolol. (BP did not improve dramatically on higher doses of atenolol and she is now getting side effects of fatigue)  Pt states that after thinking bout it she does not want to change the medications and would like to stay with Atenolol and she will need a refill. Will make Tiera aware. JNelsonRN

## 2024-03-14 ENCOUNTER — ALLIED HEALTH/NURSE VISIT (OUTPATIENT)
Dept: CARDIOLOGY | Facility: CLINIC | Age: 84
End: 2024-03-14
Payer: COMMERCIAL

## 2024-03-14 DIAGNOSIS — I10 ESSENTIAL HYPERTENSION WITH GOAL BLOOD PRESSURE LESS THAN 140/90: Primary | ICD-10-CM

## 2024-03-14 DIAGNOSIS — I10 ESSENTIAL HYPERTENSION WITH GOAL BLOOD PRESSURE LESS THAN 140/90: ICD-10-CM

## 2024-03-14 DIAGNOSIS — I49.49 PREMATURE BEATS: ICD-10-CM

## 2024-03-14 PROCEDURE — 99207 PR NO CHARGE NURSE ONLY: CPT

## 2024-03-14 RX ORDER — CARVEDILOL 6.25 MG/1
6.25 TABLET ORAL 2 TIMES DAILY WITH MEALS
Qty: 60 TABLET | Refills: 1 | Status: CANCELLED | OUTPATIENT
Start: 2024-03-14

## 2024-03-14 NOTE — CONFIDENTIAL NOTE
Last office visit: 2/22/24    Previous blood pressure:  174/52 Mm Hg     Previous heart rate: 72 bpm    Morning medications were taken at: 9:30 am    Today's blood pressure:   178/74 mm Hg    Today's heart rate: 74 bpm     Ordering Provider:  Tiera Remy PA-C    Results sent to team # :EP    Additional comments: Patient brought two of her own monitors to compare.  The older model had a reading of 200/79 and the newer model had a reading of 177/79 and the wrist model had a reading of 162/78.  Her readings at home have been high also.      RAO CavazosA

## 2024-03-14 NOTE — TELEPHONE ENCOUNTER
Newer BP monitor looks most accurate!  That is great.  She should continue to get readings on that instead of the old cuff or the wrist cuff.    Please stop atenolol 50 mg BID, start carvedilol 6.25 mg BID.  Given how leery she is about new medications, I think I will wait to increase the carvedilol for at least 2 weeks (different than what I previously recommended).  I have gotten Rx ready - pls send in after you confirm.    She should contact us in about 2 weeks with an update on what her BPs are reading on her new cuff to see if we need to go up on the carvedilol.        Thanks-Tiera

## 2024-03-18 NOTE — TELEPHONE ENCOUNTER
Spoke to patient to review recommendations per KOLBY Severino  Newer BP monitor looks most accurate!  That is great.  She should continue to get readings on that instead of the old cuff or the wrist cuff.     Please stop atenolol 50 mg BID, start carvedilol 6.25 mg BID.  Given how leery she is about new medications, I think I will wait to increase the carvedilol for at least 2 weeks (different than what I previously recommended).  I have gotten Rx ready - pls send in after you confirm.     She should contact us in about 2 weeks with an update on what her BPs are reading on her new cuff to see if we need to go up on the carvedilol.        Patient does NOT want to switch medications.  She reports she feels comfortable being on the current medication and is fearful of side effects she may encounter with the new medications.   Informed patient that I would reach out to Tiera to let her know the above.  YASMEEN Mast

## 2024-03-18 NOTE — TELEPHONE ENCOUNTER
Message left for patient to review recommendations per KOLBY Severino.  Waiting call back from patient. YASMEEN Mast

## 2024-03-19 RX ORDER — ATENOLOL 50 MG/1
50 TABLET ORAL 2 TIMES DAILY
Qty: 180 TABLET | Refills: 3 | Status: SHIPPED | OUTPATIENT
Start: 2024-03-19 | End: 2024-08-21

## 2024-03-19 NOTE — TELEPHONE ENCOUNTER
Spoke to pt and made her aware that no changes will be made to her medication. Atenolol 50 mg bid has been put back on the med list and sent to pharmacy. Angela

## 2024-03-19 NOTE — TELEPHONE ENCOUNTER
Thank you for update.  Karen and I, along with her daughter, have reviewed risk of elevated BP, including CVA, TIA, MI, CAD, LVH, kidney failure, vision loss, etc, and has voiced understanding of these risks.  She understands that my recommendation has been for better BP control and options of different medications to go along with lifestyle recommendations in an effort to get BP down.    Please update medication list with what she is currently taking (I believe she is staying on atenolol).  Annual follow-up has been ordered.  Happy to see her back if issues in the interim.    Tiera  March 19, 2024 at 8:04 AM

## 2024-08-21 ENCOUNTER — OFFICE VISIT (OUTPATIENT)
Dept: FAMILY MEDICINE | Facility: CLINIC | Age: 84
End: 2024-08-21
Payer: COMMERCIAL

## 2024-08-21 VITALS
WEIGHT: 91.3 LBS | TEMPERATURE: 97.5 F | HEART RATE: 63 BPM | SYSTOLIC BLOOD PRESSURE: 170 MMHG | DIASTOLIC BLOOD PRESSURE: 60 MMHG | OXYGEN SATURATION: 100 % | BODY MASS INDEX: 17.24 KG/M2 | HEIGHT: 61 IN

## 2024-08-21 DIAGNOSIS — Z13.220 ENCOUNTER FOR SCREENING FOR LIPID DISORDER: ICD-10-CM

## 2024-08-21 DIAGNOSIS — I47.10 SVT (SUPRAVENTRICULAR TACHYCARDIA) (H): ICD-10-CM

## 2024-08-21 DIAGNOSIS — I10 ESSENTIAL HYPERTENSION: ICD-10-CM

## 2024-08-21 DIAGNOSIS — R41.3 MEMORY DEFICIT: ICD-10-CM

## 2024-08-21 DIAGNOSIS — Z78.0 ASYMPTOMATIC MENOPAUSE: ICD-10-CM

## 2024-08-21 DIAGNOSIS — R63.4 LOSS OF WEIGHT: ICD-10-CM

## 2024-08-21 DIAGNOSIS — Z00.00 ENCOUNTER FOR MEDICARE ANNUAL WELLNESS EXAM: Primary | ICD-10-CM

## 2024-08-21 LAB
ALBUMIN SERPL BCG-MCNC: 4 G/DL (ref 3.5–5.2)
ALP SERPL-CCNC: 66 U/L (ref 40–150)
ALT SERPL W P-5'-P-CCNC: 19 U/L (ref 0–50)
ANION GAP SERPL CALCULATED.3IONS-SCNC: 10 MMOL/L (ref 7–15)
AST SERPL W P-5'-P-CCNC: 33 U/L (ref 0–45)
BILIRUB SERPL-MCNC: 0.4 MG/DL
BUN SERPL-MCNC: 22.8 MG/DL (ref 8–23)
CALCIUM SERPL-MCNC: 9.2 MG/DL (ref 8.8–10.4)
CHLORIDE SERPL-SCNC: 107 MMOL/L (ref 98–107)
CHOLEST SERPL-MCNC: 210 MG/DL
CREAT SERPL-MCNC: 0.8 MG/DL (ref 0.51–0.95)
EGFRCR SERPLBLD CKD-EPI 2021: 73 ML/MIN/1.73M2
ERYTHROCYTE [DISTWIDTH] IN BLOOD BY AUTOMATED COUNT: 12.3 % (ref 10–15)
FASTING STATUS PATIENT QL REPORTED: YES
FASTING STATUS PATIENT QL REPORTED: YES
GLUCOSE SERPL-MCNC: 115 MG/DL (ref 70–99)
HCO3 SERPL-SCNC: 24 MMOL/L (ref 22–29)
HCT VFR BLD AUTO: 41.5 % (ref 35–47)
HDLC SERPL-MCNC: 62 MG/DL
HGB BLD-MCNC: 13.5 G/DL (ref 11.7–15.7)
LDLC SERPL CALC-MCNC: 129 MG/DL
MCH RBC QN AUTO: 29.4 PG (ref 26.5–33)
MCHC RBC AUTO-ENTMCNC: 32.5 G/DL (ref 31.5–36.5)
MCV RBC AUTO: 90 FL (ref 78–100)
NONHDLC SERPL-MCNC: 148 MG/DL
PLATELET # BLD AUTO: 188 10E3/UL (ref 150–450)
POTASSIUM SERPL-SCNC: 4.1 MMOL/L (ref 3.4–5.3)
PROT SERPL-MCNC: 6.6 G/DL (ref 6.4–8.3)
RBC # BLD AUTO: 4.59 10E6/UL (ref 3.8–5.2)
SODIUM SERPL-SCNC: 141 MMOL/L (ref 135–145)
TRIGL SERPL-MCNC: 94 MG/DL
TSH SERPL DL<=0.005 MIU/L-ACNC: 1.02 UIU/ML (ref 0.3–4.2)
VIT B12 SERPL-MCNC: 762 PG/ML (ref 232–1245)
WBC # BLD AUTO: 8.1 10E3/UL (ref 4–11)

## 2024-08-21 PROCEDURE — 82607 VITAMIN B-12: CPT | Performed by: INTERNAL MEDICINE

## 2024-08-21 PROCEDURE — 80061 LIPID PANEL: CPT | Performed by: INTERNAL MEDICINE

## 2024-08-21 PROCEDURE — 36415 COLL VENOUS BLD VENIPUNCTURE: CPT | Performed by: INTERNAL MEDICINE

## 2024-08-21 PROCEDURE — 80053 COMPREHEN METABOLIC PANEL: CPT | Performed by: INTERNAL MEDICINE

## 2024-08-21 PROCEDURE — 99214 OFFICE O/P EST MOD 30 MIN: CPT | Mod: 25 | Performed by: INTERNAL MEDICINE

## 2024-08-21 PROCEDURE — 84443 ASSAY THYROID STIM HORMONE: CPT | Performed by: INTERNAL MEDICINE

## 2024-08-21 PROCEDURE — 99387 INIT PM E/M NEW PAT 65+ YRS: CPT | Performed by: INTERNAL MEDICINE

## 2024-08-21 PROCEDURE — 85027 COMPLETE CBC AUTOMATED: CPT | Performed by: INTERNAL MEDICINE

## 2024-08-21 RX ORDER — AMLODIPINE BESYLATE 2.5 MG/1
2.5 TABLET ORAL DAILY
Qty: 90 TABLET | Refills: 1 | Status: SHIPPED | OUTPATIENT
Start: 2024-08-21 | End: 2024-08-21

## 2024-08-21 RX ORDER — LOSARTAN POTASSIUM 25 MG/1
25 TABLET ORAL DAILY
Qty: 90 TABLET | Refills: 1 | Status: SHIPPED | OUTPATIENT
Start: 2024-08-21 | End: 2024-08-21

## 2024-08-21 RX ORDER — ATENOLOL 50 MG/1
50 TABLET ORAL 2 TIMES DAILY
Qty: 180 TABLET | Refills: 3 | Status: SHIPPED | OUTPATIENT
Start: 2024-08-21

## 2024-08-21 RX ORDER — LOSARTAN POTASSIUM 25 MG/1
12.5 TABLET ORAL DAILY
Qty: 45 TABLET | Refills: 1 | Status: SHIPPED | OUTPATIENT
Start: 2024-08-21

## 2024-08-21 SDOH — HEALTH STABILITY: PHYSICAL HEALTH: ON AVERAGE, HOW MANY DAYS PER WEEK DO YOU ENGAGE IN MODERATE TO STRENUOUS EXERCISE (LIKE A BRISK WALK)?: 1 DAY

## 2024-08-21 SDOH — HEALTH STABILITY: PHYSICAL HEALTH: ON AVERAGE, HOW MANY MINUTES DO YOU ENGAGE IN EXERCISE AT THIS LEVEL?: 20 MIN

## 2024-08-21 ASSESSMENT — SOCIAL DETERMINANTS OF HEALTH (SDOH): HOW OFTEN DO YOU GET TOGETHER WITH FRIENDS OR RELATIVES?: TWICE A WEEK

## 2024-08-21 NOTE — PATIENT INSTRUCTIONS
As discussed, please do fasting labs placed    Will consider starting on Losartan low dose daily for your Uncontrolled BP inspite of current Atenolol. ( As you tole cannot take Amlodipine, diuretics, lisinopril )     ========================    Patient Education   Preventive Care Advice   This is general advice given by our system to help you stay healthy. However, your care team may have specific advice just for you. Please talk to your care team about your preventive care needs.  Nutrition  Eat 5 or more servings of fruits and vegetables each day.  Try wheat bread, brown rice and whole grain pasta (instead of white bread, rice, and pasta).  Get enough calcium and vitamin D. Check the label on foods and aim for 100% of the RDA (recommended daily allowance).  Lifestyle  Exercise at least 150 minutes each week  (30 minutes a day, 5 days a week).  Do muscle strengthening activities 2 days a week. These help control your weight and prevent disease.  No smoking.  Wear sunscreen to prevent skin cancer.  Have a dental exam and cleaning every 6 months.  Yearly exams  See your health care team every year to talk about:  Any changes in your health.  Any medicines your care team has prescribed.  Preventive care, family planning, and ways to prevent chronic diseases.  Shots (vaccines)   HPV shots (up to age 26), if you've never had them before.  Hepatitis B shots (up to age 59), if you've never had them before.  COVID-19 shot: Get this shot when it's due.  Flu shot: Get a flu shot every year.  Tetanus shot: Get a tetanus shot every 10 years.  Pneumococcal, hepatitis A, and RSV shots: Ask your care team if you need these based on your risk.  Shingles shot (for age 50 and up)  General health tests  Diabetes screening:  Starting at age 35, Get screened for diabetes at least every 3 years.  If you are younger than age 35, ask your care team if you should be screened for diabetes.  Cholesterol test: At age 39, start having a  cholesterol test every 5 years, or more often if advised.  Bone density scan (DEXA): At age 50, ask your care team if you should have this scan for osteoporosis (brittle bones).  Hepatitis C: Get tested at least once in your life.  STIs (sexually transmitted infections)  Before age 24: Ask your care team if you should be screened for STIs.  After age 24: Get screened for STIs if you're at risk. You are at risk for STIs (including HIV) if:  You are sexually active with more than one person.  You don't use condoms every time.  You or a partner was diagnosed with a sexually transmitted infection.  If you are at risk for HIV, ask about PrEP medicine to prevent HIV.  Get tested for HIV at least once in your life, whether you are at risk for HIV or not.  Cancer screening tests  Cervical cancer screening: If you have a cervix, begin getting regular cervical cancer screening tests starting at age 21.  Breast cancer scan (mammogram): If you've ever had breasts, begin having regular mammograms starting at age 40. This is a scan to check for breast cancer.  Colon cancer screening: It is important to start screening for colon cancer at age 45.  Have a colonoscopy test every 10 years (or more often if you're at risk) Or, ask your provider about stool tests like a FIT test every year or Cologuard test every 3 years.  To learn more about your testing options, visit:   .  For help making a decision, visit:   https://bit.ly/xu82892.  Prostate cancer screening test: If you have a prostate, ask your care team if a prostate cancer screening test (PSA) at age 55 is right for you.  Lung cancer screening: If you are a current or former smoker ages 50 to 80, ask your care team if ongoing lung cancer screenings are right for you.  For informational purposes only. Not to replace the advice of your health care provider. Copyright   2023 [employer]. All rights reserved. Clinically reviewed by the [facility]  Program. [employer] 986157 - REV 01/24.  Learning About Activities of Daily Living  What are activities of daily living?     Activities of daily living (ADLs) are the basic self-care tasks you do every day. These include eating, bathing, dressing, and moving around.  As you age, and if you have health problems, you may find that it's harder to do some of these tasks. If so, your doctor can suggest ideas that may help.  To measure what kind of help you may need, your doctor will ask how well you are able to do ADLs. Let your doctor know if there are any tasks that you are having trouble doing. This is an important first step to getting help. And when you have the help you need, you can stay as independent as possible.  How will a doctor assess your ADLs?  Asking about ADLs is part of a routine health checkup your doctor will likely do as you age. Your health check might be done in a doctor's office, in your home, or at a hospital. The goal is to find out if you are having any problems that could make it hard to care for yourself or that make it unsafe for you to be on your own.  To measure your ADLs, your doctor will ask how hard it is for you to do routine tasks. Your doctor may also want to know if you have changed the way you do a task because of a health problem. Your doctor may watch how you:  Walk back and forth.  Keep your balance while you stand or walk.  Move from sitting to standing or from a bed to a chair.  Button or unbutton a shirt or sweater.  Remove and put on your shoes.  It's common to feel a little worried or anxious if you find you can't do all the things you used to be able to do. Talking with your doctor about ADLs is a way to make sure you're as safe as possible and able to care for yourself as well as you can. You may want to bring a caregiver, friend, or family member to your checkup. They can help you talk to your doctor.  Follow-up care is a key part of your treatment and safety. Be sure to  make and go to all appointments, and call your doctor if you are having problems. It's also a good idea to know your test results and keep a list of the medicines you take.  Current as of: October 24, 2023  Content Version: 14.1    0409-0812 [employer].   Care instructions adapted under license by your healthcare professional. If you have questions about a medical condition or this instruction, always ask your healthcare professional. [employer] disclaims any warranty or liability for your use of this information.    Hearing Loss: Care Instructions  Overview     Hearing loss is a sudden or slow decrease in how well you hear. It can range from slight to profound. Permanent hearing loss can occur with aging. It also can happen when you are exposed long-term to loud noise. Examples include listening to loud music, riding motorcycles, or being around other loud machines.  Hearing loss can affect your work and home life. It can make you feel lonely or depressed. You may feel that you have lost your independence. But hearing aids and other devices can help you hear better and feel connected to others.  Follow-up care is a key part of your treatment and safety. Be sure to make and go to all appointments, and call your doctor if you are having problems. It's also a good idea to know your test results and keep a list of the medicines you take.  How can you care for yourself at home?  Avoid loud noises whenever possible. This helps keep your hearing from getting worse.  Always wear hearing protection around loud noises.  Wear a hearing aid as directed.  A professional can help you pick a hearing aid that will work best for you.  You can also get hearing aids over the counter for mild to moderate hearing loss.  Have hearing tests as your doctor suggests. They can show whether your hearing has changed. Your hearing aid may need to be adjusted.  Use other devices as needed. These may  "include:  Telephone amplifiers and hearing aids that can connect to a television, stereo, radio, or microphone.  Devices that use lights or vibrations. These alert you to the doorbell, a ringing telephone, or a baby monitor.  Television closed-captioning. This shows the words at the bottom of the screen. Most new TVs can do this.  TTY (text telephone). This lets you type messages back and forth on the telephone instead of talking or listening. These devices are also called TDD. When messages are typed on the keyboard, they are sent over the phone line to a receiving TTY. The message is shown on a monitor.  Use text messaging, social media, and email if it is hard for you to communicate by telephone.  Try to learn a listening technique called speechreading. It is not lipreading. You pay attention to people's gestures, expressions, posture, and tone of voice. These clues can help you understand what a person is saying. Face the person you are talking to, and have them face you. Make sure the lighting is good. You need to see the other person's face clearly.  Think about counseling if you need help to adjust to your hearing loss.  When should you call for help?  Watch closely for changes in your health, and be sure to contact your doctor if:    You think your hearing is getting worse.     You have new symptoms, such as dizziness or nausea.   Where can you learn more?  Go to https://www.[employer].net/patiented  Enter R798 in the search box to learn more about \"Hearing Loss: Care Instructions.\"  Current as of: September 27, 2023               Content Version: 14.0    5340-0408 [employer]   Care instructions adapted under license by your healthcare professional. If you have questions about a medical condition or this instruction, always ask your healthcare professional. [employer] disclaims any warranty or liability for your use of this information.         "

## 2024-08-21 NOTE — LETTER
August 22, 2024      Karen SALINAS Daniel  [address]   [facility] [address]          Dear ,    We are writing to inform you of your test results.    All your labs are normal, there might be some highlighted which doesn't have any clinical significance.     Resulted Orders   Comprehensive metabolic panel (BMP + Alb, Alk Phos, ALT, AST, Total. Bili, TP)   Result Value Ref Range    Sodium 141 135 - 145 mmol/L    Potassium 4.1 3.4 - 5.3 mmol/L    Carbon Dioxide (CO2) 24 22 - 29 mmol/L    Anion Gap 10 7 - 15 mmol/L    Urea Nitrogen 22.8 8.0 - 23.0 mg/dL    Creatinine 0.80 0.51 - 0.95 mg/dL    GFR Estimate 73 >60 mL/min/1.73m2      Comment:      eGFR calculated using 2021 CKD-EPI equation.    Calcium 9.2 8.8 - 10.4 mg/dL      Comment:      Reference intervals for this test were updated on 7/16/2024 to reflect our healthy population more accurately. There may be differences in the flagging of prior results with similar values performed with this method. Those prior results can be interpreted in the context of the updated reference intervals.    Chloride 107 98 - 107 mmol/L    Glucose 115 (H) 70 - 99 mg/dL    Alkaline Phosphatase 66 40 - 150 U/L    AST 33 0 - 45 U/L    ALT 19 0 - 50 U/L    Protein Total 6.6 6.4 - 8.3 g/dL    Albumin 4.0 3.5 - 5.2 g/dL    Bilirubin Total 0.4 <=1.2 mg/dL    Patient Fasting > 8hrs? Yes    TSH with free T4 reflex   Result Value Ref Range    TSH 1.02 0.30 - 4.20 uIU/mL   CBC with platelets   Result Value Ref Range    WBC Count 8.1 4.0 - 11.0 10e3/uL    RBC Count 4.59 3.80 - 5.20 10e6/uL    Hemoglobin 13.5 11.7 - 15.7 g/dL    Hematocrit 41.5 35.0 - 47.0 %    MCV 90 78 - 100 fL    MCH 29.4 26.5 - 33.0 pg    MCHC 32.5 31.5 - 36.5 g/dL    RDW 12.3 10.0 - 15.0 %    Platelet Count 188 150 - 450 10e3/uL   Lipid panel reflex to direct LDL Fasting   Result Value Ref Range    Cholesterol 210 (H) <200 mg/dL    Triglycerides 94 <150 mg/dL    Direct Measure HDL 62 >=50 mg/dL    LDL  Cholesterol Calculated 129 (H) <=100 mg/dL    Non HDL Cholesterol 148 (H) <130 mg/dL    Patient Fasting > 8hrs? Yes     Narrative    Cholesterol  Desirable:  <200 mg/dL    Triglycerides  Normal:  Less than 150 mg/dL  Borderline High:  150-199 mg/dL  High:  200-499 mg/dL  Very High:  Greater than or equal to 500 mg/dL    Direct Measure HDL  Female:  Greater than or equal to 50 mg/dL   Male:  Greater than or equal to 40 mg/dL    LDL Cholesterol  Desirable:  <100mg/dL  Above Desirable:  100-129 mg/dL   Borderline High:  130-159 mg/dL   High:  160-189 mg/dL   Very High:  >= 190 mg/dL    Non HDL Cholesterol  Desirable:  130 mg/dL  Above Desirable:  130-159 mg/dL  Borderline High:  160-189 mg/dL  High:  190-219 mg/dL  Very High:  Greater than or equal to 220 mg/dL   Vitamin B12   Result Value Ref Range    Vitamin B12 762 232 - 1,245 pg/mL           If you have any questions or concerns, please call the clinic at the number listed above.       Sincerely,      Loni Lamar MD

## 2024-08-21 NOTE — PROGRESS NOTES
Preventive Care Visit  [facility] TANIA Lamar MD, Internal Medicine  Aug 21, 2024          Assessment and Plan  1. Encounter for Medicare annual wellness exam    Patient is new to [place], not seen as after 2018.  She is here for annual wellness visit.  Currently on Medications for hypertension including atenolol 50 mg twice daily.  Last lab work in November 2023 showing normal BMP, last CBC in 2021 normal.  Lipid panel not done after 2013 showing LDL at 158 at that time.  Can recheck all labs.    Pt lives alone in a [facility] apartment. Daughter visits her nearby once a week.     - REVIEW OF HEALTH MAINTENANCE PROTOCOL ORDERS  - DEXA HIP/PELVIS/SPINE - Future; Future  - PRIMARY CARE FOLLOW-UP SCHEDULING; Future  - Comprehensive metabolic panel (BMP + Alb, Alk Phos, ALT, AST, Total. Bili, TP); Future  - TSH with free T4 reflex; Future  - CBC with platelets; Future  - Lipid panel reflex to direct LDL Fasting; Future  - Vitamin B12; Future  - Comprehensive metabolic panel (BMP + Alb, Alk Phos, ALT, AST, Total. Bili, TP)  - TSH with free T4 reflex  - CBC with platelets  - Lipid panel reflex to direct LDL Fasting  - Vitamin B12    2. Essential hypertension  Uncontrolled, inspite of current Atenolol. Pt has multiple concerns on almost all BP medication choices offered to her . ( Cannot give diuretics due to Sulfa allergies , Lisinopril due to memory problem worsened after taking it , Amlodipine as they think it has Sulfa in its formula and do not want to take it )   - Most part of the appointment tried to explain them that Uncontrolled BP may lead to complications of Strokes and heart attacks . Given that all the important medications are been deselected by the patient , very few choices to treat this patient.   - Shared decision to start on Losartan low dose at this time. With instructions given on BP follow up as well as renal function check needed which they understood and agreed with  the plan.   - Comprehensive metabolic panel (BMP + Alb, Alk Phos, ALT, AST, Total. Bili, TP); Future  - Comprehensive metabolic panel (BMP + Alb, Alk Phos, ALT, AST, Total. Bili, TP)  - atenolol (TENORMIN) 50 MG tablet; Take 1 tablet (50 mg) by mouth 2 times daily.  Dispense: 180 tablet; Refill: 3  - losartan (COZAAR) 25 MG tablet; Take 0.5 tablets (12.5 mg) by mouth daily.  Dispense: 45 tablet; Refill: 1    3. SVT (supraventricular tachycardia) (H24)  Chronic stable, asymptomatic.  Patient is following cardiology regularly but last visit of cardiology is mentioning to transition to carvedilol instead of current atenolol but patient is not opting on this as she states today.  Continuing her atenolol and want to remain on the same.  To follow-up with recommendations of cardiology and update them.  Patient understood and agreed with the plan.    4. Memory deficit  New problem, with mini cog at 2 today. Pt denies it but daughter does states that there are instances which she is aware that pt does exhibits memory deficit intermittently. Will consider checking TSH and B12 before further recommendations. Pt understood and agreed with the plan.   - TSH with free T4 reflex; Future  - CBC with platelets; Future  - Vitamin B12; Future  - TSH with free T4 reflex  - CBC with platelets  - Vitamin B12    5. Encounter for screening for lipid disorder  - Lipid panel reflex to direct LDL Fasting; Future  - Lipid panel reflex to direct LDL Fasting    6. Asymptomatic menopause  - DEXA HIP/PELVIS/SPINE - Future; Future       Sallie # Daughter in the room today.     Please note that this note consists of symbols derived from keyboarding, dictation and/or voice recognition software. As a result, there may be errors in the script that have gone undetected. Please consider this when interpreting information found in this chart.    Patient Instructions   As discussed, please do fasting labs placed    Will consider starting on Losartan low  dose daily for your Uncontrolled BP inspite of current Atenolol. ( As you tole cannot take Amlodipine, diuretics, lisinopril )     ========================    Patient Education  Preventive Care Advice   This is general advice given by our system to help you stay healthy. However, your care team may have specific advice just for you. Please talk to your care team about your preventive care needs.  Nutrition  Eat 5 or more servings of fruits and vegetables each day.  Try wheat bread, brown rice and whole grain pasta (instead of white bread, rice, and pasta).  Get enough calcium and vitamin D. Check the label on foods and aim for 100% of the RDA (recommended daily allowance).  Lifestyle  Exercise at least 150 minutes each week  (30 minutes a day, 5 days a week).  Do muscle strengthening activities 2 days a week. These help control your weight and prevent disease.  No smoking.  Wear sunscreen to prevent skin cancer.  Have a dental exam and cleaning every 6 months.  Yearly exams  See your health care team every year to talk about:  Any changes in your health.  Any medicines your care team has prescribed.  Preventive care, family planning, and ways to prevent chronic diseases.  Shots (vaccines)   HPV shots (up to age 26), if you've never had them before.  Hepatitis B shots (up to age 59), if you've never had them before.  COVID-19 shot: Get this shot when it's due.  Flu shot: Get a flu shot every year.  Tetanus shot: Get a tetanus shot every 10 years.  Pneumococcal, hepatitis A, and RSV shots: Ask your care team if you need these based on your risk.  Shingles shot (for age 50 and up)  General health tests  Diabetes screening:  Starting at age 35, Get screened for diabetes at least every 3 years.  If you are younger than age 35, ask your care team if you should be screened for diabetes.  Cholesterol test: At age 39, start having a cholesterol test every 5 years, or more often if advised.  Bone density scan (DEXA): At age  50, ask your care team if you should have this scan for osteoporosis (brittle bones).  Hepatitis C: Get tested at least once in your life.  STIs (sexually transmitted infections)  Before age 24: Ask your care team if you should be screened for STIs.  After age 24: Get screened for STIs if you're at risk. You are at risk for STIs (including HIV) if:  You are sexually active with more than one person.  You don't use condoms every time.  You or a partner was diagnosed with a sexually transmitted infection.  If you are at risk for HIV, ask about PrEP medicine to prevent HIV.  Get tested for HIV at least once in your life, whether you are at risk for HIV or not.  Cancer screening tests  Cervical cancer screening: If you have a cervix, begin getting regular cervical cancer screening tests starting at age 21.  Breast cancer scan (mammogram): If you've ever had breasts, begin having regular mammograms starting at age 40. This is a scan to check for breast cancer.  Colon cancer screening: It is important to start screening for colon cancer at age 45.  Have a colonoscopy test every 10 years (or more often if you're at risk) Or, ask your provider about stool tests like a FIT test every year or Cologuard test every 3 years.  To learn more about your testing options, visit:   .  For help making a decision, visit:   https://bit.ly/kx43412.  Prostate cancer screening test: If you have a prostate, ask your care team if a prostate cancer screening test (PSA) at age 55 is right for you.  Lung cancer screening: If you are a current or former smoker ages 50 to 80, ask your care team if ongoing lung cancer screenings are right for you.  For informational purposes only. Not to replace the advice of your health care provider. Copyright   2023 [employer]. All rights reserved. Clinically reviewed by the [facility]. [employer] [id number] - REV 01/24.  Learning About Activities of Daily Living  What are  activities of daily living?     Activities of daily living (ADLs) are the basic self-care tasks you do every day. These include eating, bathing, dressing, and moving around.  As you age, and if you have health problems, you may find that it's harder to do some of these tasks. If so, your doctor can suggest ideas that may help.  To measure what kind of help you may need, your doctor will ask how well you are able to do ADLs. Let your doctor know if there are any tasks that you are having trouble doing. This is an important first step to getting help. And when you have the help you need, you can stay as independent as possible.  How will a doctor assess your ADLs?  Asking about ADLs is part of a routine health checkup your doctor will likely do as you age. Your health check might be done in a doctor's office, in your home, or at a hospital. The goal is to find out if you are having any problems that could make it hard to care for yourself or that make it unsafe for you to be on your own.  To measure your ADLs, your doctor will ask how hard it is for you to do routine tasks. Your doctor may also want to know if you have changed the way you do a task because of a health problem. Your doctor may watch how you:  Walk back and forth.  Keep your balance while you stand or walk.  Move from sitting to standing or from a bed to a chair.  Button or unbutton a shirt or sweater.  Remove and put on your shoes.  It's common to feel a little worried or anxious if you find you can't do all the things you used to be able to do. Talking with your doctor about ADLs is a way to make sure you're as safe as possible and able to care for yourself as well as you can. You may want to bring a caregiver, friend, or family member to your checkup. They can help you talk to your doctor.  Follow-up care is a key part of your treatment and safety. Be sure to make and go to all appointments, and call your doctor if you are having problems. It's also  a good idea to know your test results and keep a list of the medicines you take.  Current as of: October 24, 2023  Content Version: 14.1    0498-4424 [employer].   Care instructions adapted under license by your healthcare professional. If you have questions about a medical condition or this instruction, always ask your healthcare professional. [employer] disclaims any warranty or liability for your use of this information.    Hearing Loss: Care Instructions  Overview     Hearing loss is a sudden or slow decrease in how well you hear. It can range from slight to profound. Permanent hearing loss can occur with aging. It also can happen when you are exposed long-term to loud noise. Examples include listening to loud music, riding motorcycles, or being around other loud machines.  Hearing loss can affect your work and home life. It can make you feel lonely or depressed. You may feel that you have lost your independence. But hearing aids and other devices can help you hear better and feel connected to others.  Follow-up care is a key part of your treatment and safety. Be sure to make and go to all appointments, and call your doctor if you are having problems. It's also a good idea to know your test results and keep a list of the medicines you take.  How can you care for yourself at home?  Avoid loud noises whenever possible. This helps keep your hearing from getting worse.  Always wear hearing protection around loud noises.  Wear a hearing aid as directed.  A professional can help you pick a hearing aid that will work best for you.  You can also get hearing aids over the counter for mild to moderate hearing loss.  Have hearing tests as your doctor suggests. They can show whether your hearing has changed. Your hearing aid may need to be adjusted.  Use other devices as needed. These may include:  Telephone amplifiers and hearing aids that can connect to a television, stereo, radio, or  "microphone.  Devices that use lights or vibrations. These alert you to the doorbell, a ringing telephone, or a baby monitor.  Television closed-captioning. This shows the words at the bottom of the screen. Most new TVs can do this.  TTY (text telephone). This lets you type messages back and forth on the telephone instead of talking or listening. These devices are also called TDD. When messages are typed on the keyboard, they are sent over the phone line to a receiving TTY. The message is shown on a monitor.  Use text messaging, social media, and email if it is hard for you to communicate by telephone.  Try to learn a listening technique called speechreading. It is not lipreading. You pay attention to people's gestures, expressions, posture, and tone of voice. These clues can help you understand what a person is saying. Face the person you are talking to, and have them face you. Make sure the lighting is good. You need to see the other person's face clearly.  Think about counseling if you need help to adjust to your hearing loss.  When should you call for help?  Watch closely for changes in your health, and be sure to contact your doctor if:    You think your hearing is getting worse.     You have new symptoms, such as dizziness or nausea.   Where can you learn more?  Go to https://www.[employer].net/patiented  Enter R798 in the search box to learn more about \"Hearing Loss: Care Instructions.\"  Current as of: September 27, 2023               Content Version: 14.0    1662-4809 [employer].   Care instructions adapted under license by your healthcare professional. If you have questions about a medical condition or this instruction, always ask your healthcare professional. [employer] disclaims any warranty or liability for your use of this information.         Return in about 3 months (around 11/21/2024), or if symptoms worsen or fail to improve, for BP Recheck, If symptoms persist, Follow up " of last visit.    Loni Lamar MD  [facility] TANIA Jones is a 83 year old, presenting for the following:  Physical        8/21/2024    10:25 AM   Additional Questions   Roomed by Yasmeen MOTLEY CMA   Accompanied by daughter         Health Care Directive  Patient does not have a Health Care Directive or Living Will: Discussed advance care planning with patient; information given to patient to review.    HPI        8/21/2024   General Health   How would you rate your overall physical health? Good   Feel stress (tense, anxious, or unable to sleep) Only a little      (!) STRESS CONCERN      8/21/2024   Nutrition   Diet: Regular (no restrictions)            8/21/2024   Exercise   Days per week of moderate/strenous exercise 1 day   Average minutes spent exercising at this level 20 min      (!) EXERCISE CONCERN      8/21/2024   Social Factors   Frequency of gathering with friends or relatives Twice a week   Worry food won't last until get money to buy more No   Food not last or not have enough money for food? No   Do you have housing? (Housing is defined as stable permanent housing and does not include staying ouside in a car, in a tent, in an abandoned building, in an overnight shelter, or couch-surfing.) Yes   Are you worried about losing your housing? No   Lack of transportation? No   Unable to get utilities (heat,electricity)? No            8/21/2024   Fall Risk   Fallen 2 or more times in the past year? No    No   Trouble with walking or balance? No    No       Multiple values from one day are sorted in reverse-chronological order          8/21/2024   Activities of Daily Living- Home Safety   Needs help with the following daily activites Transportation   Safety concerns in the home None of the above            8/21/2024   Dental   Dentist two times every year? (!) NO            8/21/2024   Hearing Screening   Hearing concerns? (!) TROUBLE UNDESTANDING A SPEAKER IN A PUBLIC  MEETING OR [facility] SERVICE.            8/21/2024   Driving Risk Screening   Patient/family members have concerns about driving (!) YES             8/21/2024   General Alertness/Fatigue Screening   Have you been more tired than usual lately? No            8/21/2024   Urinary Incontinence Screening   Bothered by leaking urine in past 6 months No            8/21/2024   TB Screening   Were you born outside of the [place]? No            Today's PHQ-2 Score:       8/21/2024    10:14 AM   PHQ-2 ( 1999 [employer])   Q1: Little interest or pleasure in doing things 0   Q2: Feeling down, depressed or hopeless 0   PHQ-2 Score 0   Q1: Little interest or pleasure in doing things Not at all   Q2: Feeling down, depressed or hopeless Not at all   PHQ-2 Score 0           8/21/2024   Substance Use   Alcohol more than 3/day or more than 7/wk Not Applicable   Do you have a current opioid prescription? No   How severe/bad is pain from 1 to 10? 0/10 (No Pain)   Do you use any other substances recreationally? No        Social History     Tobacco Use    Smoking status: Never    Smokeless tobacco: Never   Vaping Use    Vaping status: Never Used   Substance Use Topics    Alcohol use: No    Drug use: No          Mammogram Screening - After age 74- determine frequency with patient based on health status, life expectancy and patient goals      Reviewed and updated as needed this visit by Provider                    Past Medical History:   Diagnosis Date    Arm fracture, right 12/11    Essential hypertension, benign     Hypertension     PAC (premature atrial contraction)     Pneumonia 1988    PVC (premature ventricular contraction)     Recurrent Otitis media      Past Surgical History:   Procedure Laterality Date    OPEN REDUCTION INTERNAL FIXATION HUMERUS PROXIMAL  12/27/2011    Procedure:OPEN REDUCTION INTERNAL FIXATION HUMERUS PROXIMAL; OPEN REDUCTION INTERNAL FIXATION RIGHT PROXIMAL HUMERUS ; Surgeon:OCTAVIO WRIGHT; Location: OR     TONSILLECTOMY  as child     OB History   No obstetric history on file.     Lab work is in process  Labs reviewed in EPIC  BP Readings from Last 3 Encounters:   08/21/24 (!) 170/60   02/22/24 (!) 174/52   12/21/23 (!) 184/64    Wt Readings from Last 3 Encounters:   08/21/24 41.4 kg (91 lb 4.8 oz)   02/22/24 44.4 kg (97 lb 12.8 oz)   12/21/23 43.5 kg (96 lb)                  Patient Active Problem List   Diagnosis    CARDIOVASCULAR SCREENING; LDL GOAL LESS THAN 130    Foot fracture, right    Fracture, humerus, right    Hypertension goal BP (blood pressure) < 140/90    Arm fracture, right    Contracture of right elbow    Osteoporosis    SVT (supraventricular tachycardia) (H24)    PVC (premature ventricular contraction)    Hypertension    Aortic valve disorder     Past Surgical History:   Procedure Laterality Date    OPEN REDUCTION INTERNAL FIXATION HUMERUS PROXIMAL  12/27/2011    Procedure:OPEN REDUCTION INTERNAL FIXATION HUMERUS PROXIMAL; OPEN REDUCTION INTERNAL FIXATION RIGHT PROXIMAL HUMERUS ; Surgeon:OCTAVIO WRIGHT; Location:[facility] OR    TONSILLECTOMY  as child       Social History     Tobacco Use    Smoking status: Never    Smokeless tobacco: Never   Substance Use Topics    Alcohol use: No     Family History   Problem Relation Age of Onset    Cerebrovascular Disease Mother         age 83    Cerebrovascular Disease Father         age  82         Current Outpatient Medications   Medication Sig Dispense Refill    atenolol (TENORMIN) 50 MG tablet Take 1 tablet (50 mg) by mouth 2 times daily. 180 tablet 3    Calcium Carbonate-Vitamin D (CALCIUM + D PO) Take by mouth twice a week      Cholecalciferol (VITAMIN D PO) Take by mouth twice a week      losartan (COZAAR) 25 MG tablet Take 0.5 tablets (12.5 mg) by mouth daily. 45 tablet 1     Allergies   Allergen Reactions    Sulfa Antibiotics Rash    Hctz      Cough, dehydration, CROSS-REACTION with sulfa allergy      Amlodipine      Patient states that she has allergy to  "sulfa and amlodipine does have sulfa component and it on the formula and does not want to take it.    Lisinopril      Pt states she developed memory loss after starting Lisinopril and do not want to take it.     Darvon [Propoxyphene Hcl] Other (See Comments)     Stiff neck       Recent Labs   Lab Test 11/08/23  1457 09/05/23  1514 12/02/21  1257   ALT  --   --  29   CR 0.76 0.84 0.83   GFRESTIMATED 78 69 67   POTASSIUM 4.0 4.2 4.4   TSH  --   --  0.99      Current providers sharing in care for this patient include:  Patient Care Team:  Saurabh Garza PA-C as PCP - General (Physician Assistant)  Saundra Remy PA-C as Assigned Heart and Vascular Provider    The following health maintenance items are reviewed in Epic and correct as of today:  Health Maintenance   Topic Date Due    ANNUAL REVIEW OF  ORDERS  Never done    Pneumococcal Vaccine: 65+ Years (1 of 2 - PCV) Never done    ZOSTER IMMUNIZATION (1 of 2) Never done    RSV VACCINE (Pregnancy & 60+) (1 - 1-dose 60+ series) Never done    MEDICARE ANNUAL WELLNESS VISIT  Never done    DEXA  09/21/2014    COVID-19 Vaccine (1 - 2023-24 season) Never done    INFLUENZA VACCINE (1) 09/01/2024    FALL RISK ASSESSMENT  08/21/2025    ADVANCE CARE PLANNING  08/21/2029    DTAP/TDAP/TD IMMUNIZATION (2 - Td or Tdap) 06/23/2032    PHQ-2 (once per calendar year)  Completed    HPV IMMUNIZATION  Aged Out    MENINGITIS IMMUNIZATION  Aged Out    RSV MONOCLONAL ANTIBODY  Aged Out         Review of Systems  Constitutional, HEENT, cardiovascular, pulmonary, GI, , musculoskeletal, neuro, skin, endocrine and psych systems are negative, except as otherwise noted.     Objective    Exam  BP (!) 196/68   Pulse 63   Temp 97.5  F (36.4  C) (Tympanic)   Ht 1.549 m (5' 1\")   Wt 41.4 kg (91 lb 4.8 oz)   SpO2 100%   BMI 17.25 kg/m     Estimated body mass index is 17.25 kg/m  as calculated from the following:    Height as of this encounter: 1.549 m (5' 1\").    Weight as of " this encounter: 41.4 kg (91 lb 4.8 oz).    Physical Exam  GENERAL: alert and no distress  EYES: Eyes grossly normal to inspection, PERRL and conjunctivae and sclerae normal  HENT: ear canals and TM's normal, nose and mouth without ulcers or lesions  NECK: no adenopathy, no asymmetry, masses, or scars  RESP: lungs clear to auscultation - no rales, rhonchi or wheezes  CV: regular rate and rhythm, normal S1 S2, no S3 or S4, no murmur, click or rub, no peripheral edema  ABDOMEN: soft, nontender, no hepatosplenomegaly, no masses and bowel sounds normal  MS: no gross musculoskeletal defects noted, no edema  SKIN: no suspicious lesions or rashes  NEURO: Normal strength and tone, mentation intact and speech normal  PSYCH: mentation appears normal, affect normal/bright         8/21/2024   Mini Cog   Clock Draw Score 2 Normal   3 Item Recall 0 objects recalled   Mini Cog Total Score 2                 Signed Electronically by: Loni Lamar MD

## 2025-02-17 ENCOUNTER — APPOINTMENT (OUTPATIENT)
Dept: CT IMAGING | Facility: CLINIC | Age: 85
End: 2025-02-17
Attending: EMERGENCY MEDICINE
Payer: COMMERCIAL

## 2025-02-17 ENCOUNTER — HOSPITAL ENCOUNTER (OUTPATIENT)
Facility: CLINIC | Age: 85
Setting detail: OBSERVATION
LOS: 1 days | Discharge: HOME | End: 2025-02-20
Attending: EMERGENCY MEDICINE | Admitting: INTERNAL MEDICINE
Payer: COMMERCIAL

## 2025-02-17 DIAGNOSIS — R42 DIZZINESS: ICD-10-CM
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
## 2025-02-17 DIAGNOSIS — I21.4 NSTEMI (NON-ST ELEVATED MYOCARDIAL INFARCTION) (H): ICD-10-CM

## 2025-02-17 LAB
ALBUMIN SERPL BCG-MCNC: 4 G/DL (ref 3.5–5.2)
ALP SERPL-CCNC: 76 U/L (ref 40–150)
ALT SERPL W P-5'-P-CCNC: 25 U/L (ref 0–50)
ANION GAP SERPL CALCULATED.3IONS-SCNC: 9 MMOL/L (ref 7–15)
AST SERPL W P-5'-P-CCNC: 30 U/L (ref 0–45)
ATRIAL RATE - MUSE: 80 BPM
BASOPHILS # BLD AUTO: 0 10E3/UL (ref 0–0.2)
BASOPHILS NFR BLD AUTO: 0 %
BILIRUB SERPL-MCNC: 0.3 MG/DL
BUN SERPL-MCNC: 17.9 MG/DL (ref 8–23)
CALCIUM SERPL-MCNC: 9.8 MG/DL (ref 8.8–10.4)
CHLORIDE SERPL-SCNC: 102 MMOL/L (ref 98–107)
CREAT SERPL-MCNC: 0.81 MG/DL (ref 0.51–0.95)
D DIMER PPP FEU-MCNC: 14.1 UG/ML FEU (ref 0–0.5)
DIASTOLIC BLOOD PRESSURE - MUSE: NORMAL MMHG
EGFRCR SERPLBLD CKD-EPI 2021: 72 ML/MIN/1.73M2
EOSINOPHIL # BLD AUTO: 0 10E3/UL (ref 0–0.7)
EOSINOPHIL NFR BLD AUTO: 0 %
ERYTHROCYTE [DISTWIDTH] IN BLOOD BY AUTOMATED COUNT: 12.4 % (ref 10–15)
GLUCOSE SERPL-MCNC: 177 MG/DL (ref 70–99)
HCO3 SERPL-SCNC: 29 MMOL/L (ref 22–29)
HCT VFR BLD AUTO: 41.4 % (ref 35–47)
HGB BLD-MCNC: 13.5 G/DL (ref 11.7–15.7)
HOLD SPECIMEN: NORMAL
IMM GRANULOCYTES # BLD: 0.1 10E3/UL
IMM GRANULOCYTES NFR BLD: 1 %
INTERPRETATION ECG - MUSE: NORMAL
LYMPHOCYTES # BLD AUTO: 2.1 10E3/UL (ref 0.8–5.3)
LYMPHOCYTES NFR BLD AUTO: 13 %
MCH RBC QN AUTO: 28.8 PG (ref 26.5–33)
MCHC RBC AUTO-ENTMCNC: 32.6 G/DL (ref 31.5–36.5)
MCV RBC AUTO: 88 FL (ref 78–100)
MONOCYTES # BLD AUTO: 1.5 10E3/UL (ref 0–1.3)
MONOCYTES NFR BLD AUTO: 9 %
NEUTROPHILS # BLD AUTO: 12.8 10E3/UL (ref 1.6–8.3)
NEUTROPHILS NFR BLD AUTO: 78 %
NRBC # BLD AUTO: 0 10E3/UL
NRBC BLD AUTO-RTO: 0 /100
P AXIS - MUSE: 69 DEGREES
PLATELET # BLD AUTO: 222 10E3/UL (ref 150–450)
POTASSIUM SERPL-SCNC: 4.2 MMOL/L (ref 3.4–5.3)
PR INTERVAL - MUSE: 140 MS
PROT SERPL-MCNC: 6.7 G/DL (ref 6.4–8.3)
QRS DURATION - MUSE: 76 MS
QT - MUSE: 330 MS
QTC - MUSE: 380 MS
R AXIS - MUSE: 54 DEGREES
RBC # BLD AUTO: 4.69 10E6/UL (ref 3.8–5.2)
SODIUM SERPL-SCNC: 140 MMOL/L (ref 135–145)
SYSTOLIC BLOOD PRESSURE - MUSE: NORMAL MMHG
T AXIS - MUSE: 23 DEGREES
TROPONIN T SERPL HS-MCNC: 42 NG/L
TROPONIN T SERPL HS-MCNC: 90 NG/L
UFH PPP CHRO-ACNC: 0.3 IU/ML
VENTRICULAR RATE- MUSE: 80 BPM
WBC # BLD AUTO: 16.5 10E3/UL (ref 4–11)

## 2025-02-17 PROCEDURE — 93005 ELECTROCARDIOGRAM TRACING: CPT

## 2025-02-17 PROCEDURE — 85025 COMPLETE CBC W/AUTO DIFF WBC: CPT | Performed by: EMERGENCY MEDICINE

## 2025-02-17 PROCEDURE — 84484 ASSAY OF TROPONIN QUANT: CPT | Performed by: SOCIAL WORKER

## 2025-02-17 PROCEDURE — 85014 HEMATOCRIT: CPT | Performed by: SOCIAL WORKER

## 2025-02-17 PROCEDURE — 36415 COLL VENOUS BLD VENIPUNCTURE: CPT | Performed by: EMERGENCY MEDICINE

## 2025-02-17 PROCEDURE — 250N000011 HC RX IP 250 OP 636: Performed by: EMERGENCY MEDICINE

## 2025-02-17 PROCEDURE — 80053 COMPREHEN METABOLIC PANEL: CPT | Performed by: EMERGENCY MEDICINE

## 2025-02-17 PROCEDURE — 96366 THER/PROPH/DIAG IV INF ADDON: CPT

## 2025-02-17 PROCEDURE — 250N000009 HC RX 250: Performed by: EMERGENCY MEDICINE

## 2025-02-17 PROCEDURE — 120N000001 HC R&B MED SURG/OB

## 2025-02-17 PROCEDURE — 85379 FIBRIN DEGRADATION QUANT: CPT | Performed by: EMERGENCY MEDICINE

## 2025-02-17 PROCEDURE — 85025 COMPLETE CBC W/AUTO DIFF WBC: CPT | Performed by: SOCIAL WORKER

## 2025-02-17 PROCEDURE — 72125 CT NECK SPINE W/O DYE: CPT

## 2025-02-17 PROCEDURE — 36415 COLL VENOUS BLD VENIPUNCTURE: CPT | Performed by: SOCIAL WORKER

## 2025-02-17 PROCEDURE — 96365 THER/PROPH/DIAG IV INF INIT: CPT

## 2025-02-17 PROCEDURE — 99285 EMERGENCY DEPT VISIT HI MDM: CPT | Mod: 25

## 2025-02-17 PROCEDURE — 84484 ASSAY OF TROPONIN QUANT: CPT | Performed by: EMERGENCY MEDICINE

## 2025-02-17 PROCEDURE — 85520 HEPARIN ASSAY: CPT | Performed by: EMERGENCY MEDICINE

## 2025-02-17 PROCEDURE — 70450 CT HEAD/BRAIN W/O DYE: CPT

## 2025-02-17 PROCEDURE — 99223 1ST HOSP IP/OBS HIGH 75: CPT | Performed by: INTERNAL MEDICINE

## 2025-02-17 PROCEDURE — 71275 CT ANGIOGRAPHY CHEST: CPT

## 2025-02-17 PROCEDURE — 80053 COMPREHEN METABOLIC PANEL: CPT | Performed by: SOCIAL WORKER

## 2025-02-17 PROCEDURE — 72131 CT LUMBAR SPINE W/O DYE: CPT

## 2025-02-17 RX ORDER — IOPAMIDOL 755 MG/ML
45 INJECTION, SOLUTION INTRAVASCULAR ONCE
Status: COMPLETED | OUTPATIENT
Start: 2025-02-17 | End: 2025-02-17

## 2025-02-17 RX ORDER — HEPARIN SODIUM 10000 [USP'U]/100ML
0-5000 INJECTION, SOLUTION INTRAVENOUS CONTINUOUS
Status: DISCONTINUED | OUTPATIENT
Start: 2025-02-17 | End: 2025-02-17

## 2025-02-17 RX ORDER — ATENOLOL 50 MG/1
50 TABLET ORAL 2 TIMES DAILY
Status: DISCONTINUED | OUTPATIENT
Start: 2025-02-17 | End: 2025-02-18

## 2025-02-17 RX ORDER — HEPARIN SODIUM 10000 [USP'U]/100ML
0-5000 INJECTION, SOLUTION INTRAVENOUS CONTINUOUS
Status: DISCONTINUED | OUTPATIENT
Start: 2025-02-17 | End: 2025-02-18

## 2025-02-17 RX ADMIN — IOPAMIDOL 45 ML: 755 INJECTION, SOLUTION INTRAVENOUS at 17:31

## 2025-02-17 RX ADMIN — SODIUM CHLORIDE 75 ML: 9 INJECTION, SOLUTION INTRAVENOUS at 17:31

## 2025-02-17 RX ADMIN — HEPARIN SODIUM 500 UNITS/HR: 10000 INJECTION, SOLUTION INTRAVENOUS at 18:03

## 2025-02-17 ASSESSMENT — ACTIVITIES OF DAILY LIVING (ADL)
ADLS_ACUITY_SCORE: 46
ADLS_ACUITY_SCORE: 44
ADLS_ACUITY_SCORE: 46
ADLS_ACUITY_SCORE: 46

## 2025-02-17 NOTE — ED TRIAGE NOTES
"Pt c/o head pain after a fall at 0800 today, no AC, unknown if LOC, pt does not know why she fell \"I just fell\", pt states she has been unsteady for \"a couple days\" and has to use a walker now, ABCD intact.       Triage Assessment (Adult)       Row Name 02/17/25 1300          Triage Assessment    Airway WDL WDL        Respiratory WDL    Respiratory WDL WDL        Skin Circulation/Temperature WDL    Skin Circulation/Temperature WDL WDL        Cardiac WDL    Cardiac WDL WDL        Peripheral/Neurovascular WDL    Peripheral Neurovascular WDL WDL        Cognitive/Neuro/Behavioral WDL    Cognitive/Neuro/Behavioral WDL WDL                     "

## 2025-02-17 NOTE — ED PROVIDER NOTES
"  Emergency Department Note      History of Present Illness     Chief Complaint   Generalized Weakness and Fall    HPI   Karen Sanchez is a 84 year old female with a history of hypertension, aortic valve disorder, and SVT presenting to the ED for evaluation after a fall. The patient reports that she fell backwards this morning onto a tile floor. She is unsure if she lost consciousness. The patient was able to get up on her own, and sat down on the couch to rest. Upon evaluation, she states that she feels off balance and dizzy when standing up. She also mentions a bump on the left side of her head, but denies a significant headache, neck pain, or back pain. No chest pain or shortness of breath. She states that the past few days she \"hasn't felt right\" but could not identify any specific symptoms. No nausea, cough, runny nose, sore throat, numbness, tingling, or weakness to the extremities. Her daughter adds that she does not typically use a cane to ambulate, but she has been using one in the last two days.     Independent Historian   Daughter as detailed above.    Review of External Notes   I reviewed the office visit note from 8/21/24.     Past Medical History     Medical History and Problem List   Essential hypertension  Hypertension  PAC (premature atrial contraction)  PVC (premature ventricular contraction)  SVT  Aortic valve disorder  Osteoporosis    Medications   Tenormin  Calcium  Cozaar     Surgical History   ORIF humerus  Tonsillectomy     Physical Exam     Patient Vitals for the past 24 hrs:   BP Temp Temp src Pulse Resp SpO2 Height Weight   02/17/25 1925 (!) 164/69 99.1  F (37.3  C) Oral 71 16 99 % -- --   02/17/25 1814 -- -- -- 72 (!) 7 -- -- --   02/17/25 1759 (!) 159/59 -- -- 76 14 -- -- --   02/17/25 1747 -- -- -- -- -- -- 1.524 m (5') 40.3 kg (88 lb 12.8 oz)   02/17/25 1644 (!) 167/77 -- -- 63 15 100 % -- --   02/17/25 1304 (!) 163/79 97.9  F (36.6  C) Oral (!) 135 16 98 % -- --     Physical " Exam  General: No acute distress  Head: No obvious trauma to head.  Ears, Nose, Throat:  External ears normal.  Nose normal.  No pharyngeal erythema, swelling or exudate.  Midline uvula. Moist mucus membranes.  Eyes:  Conjunctivae clear. EOMI. PERRL. No nystagmus.   Neck: Normal range of motion.  Neck supple. Nontender to palpation.   CV: Regular rate and rhythm.  No murmurs.      Respiratory: Effort normal and breath sounds normal.  No wheezing or crackles.   Gastrointestinal: Soft.  No distension. There is no tenderness.  There is no rigidity, no rebound and no guarding.   Musculoskeletal: Normal range of motion.  Non tender extremities to palpations. No lower extremity edema  Neuro: Alert. Moving all extremities appropriately.  Normal speech.  CN II-XII grossly intact, no pronator drift, normal finger-nose-finger, visual fields intact.  Gross muscle strength intact of the proximal and distal bilateral upper and lower extremities.  Sensation intact to light touch in all 4 extremities.   Skin: Skin is warm and dry.  No rash noted.   Psych: Normal mood and affect. Behavior is normal.      Diagnostics     Lab Results   Labs Ordered and Resulted from Time of ED Arrival to Time of ED Departure   COMPREHENSIVE METABOLIC PANEL - Abnormal       Result Value    Sodium 140      Potassium 4.2      Carbon Dioxide (CO2) 29      Anion Gap 9      Urea Nitrogen 17.9      Creatinine 0.81      GFR Estimate 72      Calcium 9.8      Chloride 102      Glucose 177 (*)     Alkaline Phosphatase 76      AST 30      ALT 25      Protein Total 6.7      Albumin 4.0      Bilirubin Total 0.3     TROPONIN T, HIGH SENSITIVITY - Abnormal    Troponin T, High Sensitivity 42 (*)    CBC WITH PLATELETS AND DIFFERENTIAL - Abnormal    WBC Count 16.5 (*)     RBC Count 4.69      Hemoglobin 13.5      Hematocrit 41.4      MCV 88      MCH 28.8      MCHC 32.6      RDW 12.4      Platelet Count 222      % Neutrophils 78      % Lymphocytes 13      % Monocytes 9       % Eosinophils 0      % Basophils 0      % Immature Granulocytes 1      NRBCs per 100 WBC 0      Absolute Neutrophils 12.8 (*)     Absolute Lymphocytes 2.1      Absolute Monocytes 1.5 (*)     Absolute Eosinophils 0.0      Absolute Basophils 0.0      Absolute Immature Granulocytes 0.1      Absolute NRBCs 0.0     TROPONIN T, HIGH SENSITIVITY - Abnormal    Troponin T, High Sensitivity 90 (*)    D DIMER QUANTITATIVE - Abnormal    D-Dimer Quantitative 14.10 (*)      Imaging   CT Chest Pulmonary Embolism w Contrast   Final Result   IMPRESSION:   1.  No pulmonary emboli. No acute aortic syndrome.   2.  CT signs of potential mild nonspecific bronchial inflammation and multifocal air trapping.   3.  Mild calcified atheromatous plaque LAD and circumflex coronary arteries.   4.  Bones are demineralized with mild chronic vertebral body compression T3 and T6.      CT Lumbar Spine w/o Contrast   Final Result    IMPRESSION:   1.  No acute fracture.      2.  Chronic spine changes described above.      CT Cervical Spine w/o Contrast   Final Result    IMPRESSION:   1.  T3 vertebral body superior endplate partially visualized mild to moderate compression deformity age-indeterminate. This is better visualized on the CT chest performed concomitantly.      2.  No acute fracture within the cervical spine.        3.  Chronic spine changes described above.      Head CT w/o contrast   Final Result   IMPRESSION:   1.  No acute intracranial process.      2.  Chronic intracranial changes described above.        EKG   ECG results from 02/17/25   EKG 12-lead, tracing only     Value    Systolic Blood Pressure     Diastolic Blood Pressure     Ventricular Rate 80    Atrial Rate 80    MO Interval 140    QRS Duration 76        QTc 380    P Axis 69    R AXIS 54    T Axis 23    Interpretation ECG      Sinus rhythm with sinus arrhythmia  Nonspecific ST abnormality  Abnormal ECG  Read by Silvino Gudino MD at 1428        Independent Interpretation    CT Head: No intracranial hemorrhage or midline shift.    ED Course      Medications Administered   Medications   heparin 25,000 units in 0.45% NaCl 250 mL ANTICOAGULANT infusion (500 Units/hr Intravenous Rate/Dose Verify 2/17/25 1933)   iopamidol (ISOVUE-370) solution 45 mL (45 mLs Intravenous $Given 2/17/25 1731)   Saline flush (75 mLs Intravenous $Given 2/17/25 1731)   heparin loading dose for LOW INTENSITY TREATMENT * Give BEFORE starting heparin infusion (2,500 Units Intravenous $Given 2/17/25 1802)     Procedures   Procedures     ED Course/Discussion of Management   ED Course as of 02/17/25 1938   Mon Feb 17, 2025   1429 I obtained history and examined the patient as noted above.    1619 I rechecked the patient and explained findings.  Patient is comfortable with plan for admission.    1830 I spoke with Dr. Quan, hospitalist, regarding the patient's history and presentation in the emergency department today. Dr. Quan accepted the patient for admission.         Additional Documentation  None    Medical Decision Making / Diagnosis     CMS Diagnoses: None    MIPS    CT for PE was ordered because the patient had an abnormal d-dimer.  CT/MRI of the lumbar spine was obtained because of risk factors for fracture (minor trauma in elderly/osteoporosis).    ROSALINO Sanchez is a 84 year old female presenting for evaluation after a fall.  She does have a slight leukocytosis.  D-dimer is elevated.  CT of the chest is negative for pulmonary embolism.  CT head is unremarkable.  CT cervical spine and lumbar spine are negative for acute fracture.  EKG does show inferior lateral ST depressions.  Troponin is elevated to 42.  She denies any chest pain or shortness of breath.  Repeat troponin increases to 90.  Heparin infusion is started for NSTEMI.  The patient continues to deny any chest pain.  I discussed the patient with the hospitalist agreed to admit the patient their service.  She remains in stable  condition.    Disposition   The patient was admitted to the hospital.     Diagnosis     ICD-10-CM    1. NSTEMI (non-ST elevated myocardial infarction) (H)  I21.4          Scribe Disclosure:  I, Hawa Maite, am serving as a scribe at 2:29 PM on 2/17/2025 to document services personally performed by Silvino Gudino MD based on my observations and the provider's statements to me.        Silvino Gudino MD  02/17/25 2112

## 2025-02-18 ENCOUNTER — APPOINTMENT (OUTPATIENT)
Dept: ULTRASOUND IMAGING | Facility: CLINIC | Age: 85
End: 2025-02-18
Attending: INTERNAL MEDICINE
Payer: COMMERCIAL

## 2025-02-18 LAB
ALBUMIN SERPL BCG-MCNC: 3.3 G/DL (ref 3.5–5.2)
ALBUMIN UR-MCNC: NEGATIVE MG/DL
ALP SERPL-CCNC: 63 U/L (ref 40–150)
ALT SERPL W P-5'-P-CCNC: 21 U/L (ref 0–50)
ANION GAP SERPL CALCULATED.3IONS-SCNC: 8 MMOL/L (ref 7–15)
APPEARANCE UR: CLEAR
AST SERPL W P-5'-P-CCNC: 28 U/L (ref 0–45)
BILIRUB SERPL-MCNC: 0.6 MG/DL
BILIRUB UR QL STRIP: NEGATIVE
BUN SERPL-MCNC: 18.6 MG/DL (ref 8–23)
CALCIUM SERPL-MCNC: 8.7 MG/DL (ref 8.8–10.4)
CHLORIDE SERPL-SCNC: 106 MMOL/L (ref 98–107)
COLOR UR AUTO: ABNORMAL
CREAT SERPL-MCNC: 0.84 MG/DL (ref 0.51–0.95)
EGFRCR SERPLBLD CKD-EPI 2021: 69 ML/MIN/1.73M2
ERYTHROCYTE [DISTWIDTH] IN BLOOD BY AUTOMATED COUNT: 12.6 % (ref 10–15)
GLUCOSE SERPL-MCNC: 130 MG/DL (ref 70–99)
GLUCOSE UR STRIP-MCNC: NEGATIVE MG/DL
HCO3 SERPL-SCNC: 26 MMOL/L (ref 22–29)
HCT VFR BLD AUTO: 36.2 % (ref 35–47)
HGB BLD-MCNC: 12 G/DL (ref 11.7–15.7)
HGB UR QL STRIP: NEGATIVE
KETONES UR STRIP-MCNC: NEGATIVE MG/DL
LEUKOCYTE ESTERASE UR QL STRIP: ABNORMAL
MCH RBC QN AUTO: 29 PG (ref 26.5–33)
MCHC RBC AUTO-ENTMCNC: 33.1 G/DL (ref 31.5–36.5)
MCV RBC AUTO: 87 FL (ref 78–100)
MUCOUS THREADS #/AREA URNS LPF: PRESENT /LPF
NITRATE UR QL: NEGATIVE
PH UR STRIP: 6.5 [PH] (ref 5–7)
PLATELET # BLD AUTO: 181 10E3/UL (ref 150–450)
POTASSIUM SERPL-SCNC: 4.1 MMOL/L (ref 3.4–5.3)
PROT SERPL-MCNC: 5.6 G/DL (ref 6.4–8.3)
RBC # BLD AUTO: 4.14 10E6/UL (ref 3.8–5.2)
RBC URINE: <1 /HPF
SODIUM SERPL-SCNC: 140 MMOL/L (ref 135–145)
SP GR UR STRIP: 1.02 (ref 1–1.03)
TROPONIN T SERPL HS-MCNC: 83 NG/L
TROPONIN T SERPL HS-MCNC: 90 NG/L
UFH PPP CHRO-ACNC: 0.23 IU/ML
UROBILINOGEN UR STRIP-MCNC: NORMAL MG/DL
WBC # BLD AUTO: 12.7 10E3/UL (ref 4–11)
WBC URINE: 1 /HPF

## 2025-02-18 PROCEDURE — 250N000013 HC RX MED GY IP 250 OP 250 PS 637: Performed by: INTERNAL MEDICINE

## 2025-02-18 PROCEDURE — 36415 COLL VENOUS BLD VENIPUNCTURE: CPT | Performed by: INTERNAL MEDICINE

## 2025-02-18 PROCEDURE — 84484 ASSAY OF TROPONIN QUANT: CPT | Performed by: INTERNAL MEDICINE

## 2025-02-18 PROCEDURE — 85520 HEPARIN ASSAY: CPT | Performed by: INTERNAL MEDICINE

## 2025-02-18 PROCEDURE — 93880 EXTRACRANIAL BILAT STUDY: CPT

## 2025-02-18 PROCEDURE — 96366 THER/PROPH/DIAG IV INF ADDON: CPT

## 2025-02-18 PROCEDURE — G0378 HOSPITAL OBSERVATION PER HR: HCPCS

## 2025-02-18 PROCEDURE — 99232 SBSQ HOSP IP/OBS MODERATE 35: CPT | Performed by: INTERNAL MEDICINE

## 2025-02-18 PROCEDURE — 80053 COMPREHEN METABOLIC PANEL: CPT | Performed by: INTERNAL MEDICINE

## 2025-02-18 PROCEDURE — 81001 URINALYSIS AUTO W/SCOPE: CPT | Performed by: INTERNAL MEDICINE

## 2025-02-18 PROCEDURE — 85027 COMPLETE CBC AUTOMATED: CPT | Performed by: INTERNAL MEDICINE

## 2025-02-18 PROCEDURE — 99204 OFFICE O/P NEW MOD 45 MIN: CPT | Performed by: INTERNAL MEDICINE

## 2025-02-18 RX ORDER — CARBOXYMETHYLCELLULOSE SODIUM 5 MG/ML
1 SOLUTION/ DROPS OPHTHALMIC AT BEDTIME
Status: DISCONTINUED | OUTPATIENT
Start: 2025-02-18 | End: 2025-02-20 | Stop reason: HOSPADM

## 2025-02-18 RX ORDER — CALCIUM CARBONATE 500 MG/1
1000 TABLET, CHEWABLE ORAL 4 TIMES DAILY PRN
Status: DISCONTINUED | OUTPATIENT
Start: 2025-02-18 | End: 2025-02-20 | Stop reason: HOSPADM

## 2025-02-18 RX ORDER — AMOXICILLIN 250 MG
2 CAPSULE ORAL 2 TIMES DAILY PRN
Status: DISCONTINUED | OUTPATIENT
Start: 2025-02-18 | End: 2025-02-20 | Stop reason: HOSPADM

## 2025-02-18 RX ORDER — ONDANSETRON 4 MG/1
4 TABLET, ORALLY DISINTEGRATING ORAL EVERY 6 HOURS PRN
Status: DISCONTINUED | OUTPATIENT
Start: 2025-02-18 | End: 2025-02-20 | Stop reason: HOSPADM

## 2025-02-18 RX ORDER — LIDOCAINE 40 MG/G
CREAM TOPICAL
Status: DISCONTINUED | OUTPATIENT
Start: 2025-02-18 | End: 2025-02-20 | Stop reason: HOSPADM

## 2025-02-18 RX ORDER — ONDANSETRON 2 MG/ML
4 INJECTION INTRAMUSCULAR; INTRAVENOUS EVERY 6 HOURS PRN
Status: DISCONTINUED | OUTPATIENT
Start: 2025-02-18 | End: 2025-02-20 | Stop reason: HOSPADM

## 2025-02-18 RX ORDER — ATENOLOL 50 MG/1
50 TABLET ORAL AT BEDTIME
Status: DISCONTINUED | OUTPATIENT
Start: 2025-02-18 | End: 2025-02-20

## 2025-02-18 RX ORDER — AMOXICILLIN 250 MG
1 CAPSULE ORAL 2 TIMES DAILY PRN
Status: DISCONTINUED | OUTPATIENT
Start: 2025-02-18 | End: 2025-02-20 | Stop reason: HOSPADM

## 2025-02-18 RX ADMIN — CARBOXYMETHYLCELLULOSE SODIUM 1 DROP: 5 SOLUTION/ DROPS OPHTHALMIC at 20:29

## 2025-02-18 RX ADMIN — ATENOLOL 50 MG: 50 TABLET ORAL at 00:31

## 2025-02-18 RX ADMIN — LOSARTAN POTASSIUM 12.5 MG: 25 TABLET, FILM COATED ORAL at 07:52

## 2025-02-18 RX ADMIN — ATENOLOL 50 MG: 50 TABLET ORAL at 20:28

## 2025-02-18 ASSESSMENT — ACTIVITIES OF DAILY LIVING (ADL)
ADLS_ACUITY_SCORE: 46
ADLS_ACUITY_SCORE: 41
ADLS_ACUITY_SCORE: 46
ADLS_ACUITY_SCORE: 46
ADLS_ACUITY_SCORE: 43
ADLS_ACUITY_SCORE: 41
ADLS_ACUITY_SCORE: 46
ADLS_ACUITY_SCORE: 43
ADLS_ACUITY_SCORE: 46
ADLS_ACUITY_SCORE: 46
ADLS_ACUITY_SCORE: 43
ADLS_ACUITY_SCORE: 46
DEPENDENT_IADLS:: INDEPENDENT;SHOPPING;TRANSPORTATION
ADLS_ACUITY_SCORE: 46

## 2025-02-18 NOTE — H&P
[facility]    History and Physical - Hospitalist Service       Date of Admission:  2/17/2025    Assessment & Plan      Karen Sanchez is a 84 year old female admitted on 2/17/2025.   Karen Sanchez is a 84 year old female who  history of hypertension, aortic valve disorder, and SVT presenting to the ED for evaluation after a fall.  Patient very frail elderly female, reports that when she walked into the kitchen, felt off balance and fell.  Patient denies any dizziness or prodromal symptoms prior to the fall.  Patient is not sure if she has lost consciousness.  Patient denies hitting her head.  Patient did not have any similar episodes in the recent past.  No concern for chest pain nausea vomiting abdominal pain fever chills diarrhea in the recent past.  No sick contacts noted.  Patient has a prior history of SVT, upon chart review workup was done in 2022 with echo as below    ECHO 2022:  Hyperdynamic left ventricular function  The visual ejection fraction is >70%.  No regional wall motion abnormalities noted.  Mild (35-45mmHg) pulmonary hypertension is present.  There is no comparison study available.    In the ED upon initial presentation patient was hypertensive and tachycardic with blood pressure systolic 163, heart rate 135.  Lab workup BMP within normal limits, glucose slightly elevated, troponin initially 42 recheck 90.  Elevated WBC count at 16.5, hemoglobin 13.5, D-dimer 14.10.    Patient was seen and evaluated with work up in ED:   CT chest PE:  1.  No pulmonary emboli. No acute aortic syndrome.  2.  CT signs of potential mild nonspecific bronchial inflammation and multifocal air trapping.  3.  Mild calcified atheromatous plaque LAD and circumflex coronary arteries.  4.  Bones are demineralized with mild chronic vertebral body compression T3 and T6.    CT lumbar spine  1.  No acute fracture.  2.  Chronic spine changes described above.      CT cervical spine  1.  T3 vertebral  body superior endplate partially visualized mild to moderate compression deformity age-indeterminate. This is better visualized on the CT chest performed concomitantly.  2.  No acute fracture within the cervical spine.    3.  Chronic spine changes described above    CT head non contrast  1.  No acute intracranial process.  2.  Chronic intracranial changes described above.    Due to elevation in troponin, IV heparin was initiated in the ER and patient was admitted for further evaluation management.    NSTEMI:  -Elevated troponin,EKG with ST depression,recent fall possible syncope, no chest pain  -EKG completed in the ER showing ST depression in inferior and lateral leads.  -Mild calcified atheromatous plaque LAD and circumflex coronary arteries. On CT chest   -IV heparin in ED to be continued  -Consult to cardiology  -ECHO pending  -Telemetry  -PT OT    HTN:  -PTA Atenolol 50 mg BID and Losartan 12.5 mg daily    Recent fall: ?  Cardiogenic  ?Syncope vs Presyncope  -unclear history from patient,very vague, patient states after the fall she was able to stand up by herself and sat in the chair.  But patient does not recall if she lost time or any other seizure-like activity.  -states no LOC but unclear how she fell,clearly not mechanical fall  -CT Head NG    Leucocytosis:  -no infectious etiology noted,no symptoms,imaging negative  -no Abx at this time  -likely stress related  -recheck in AM           Diet: NPO per Anesthesia Guidelines for Procedure/Surgery Except for: Meds  DVT Prophylaxis: IV Heparin  Penny Catheter: Not present  Lines: None     Cardiac Monitoring: None  Code Status:  Full code    Clinically Significant Risk Factors Present on Admission                   # Hypertension: Noted on problem list           # Cachexia: Estimated body mass index is 17.34 kg/m  as calculated from the following:    Height as of this encounter: 1.524 m (5').    Weight as of this encounter: 40.3 kg (88 lb 12.8 oz).               Disposition Plan     Medically Ready for Discharge: Anticipated in 2-4 Days           Triston Quan MD  Hospitalist Service  [facility]  Securely message with [employer] (more info)  Text page via [employer] Paging/Directory     ______________________________________________________________________    Chief Complaint   Fall,unsteady,elevated troponin    History is obtained from the patient    History of Present Illness   Karen Sanchez is a 84 year old female who  history of hypertension, aortic valve disorder, and SVT presenting to the ED for evaluation after a fall.  Patient very frail elderly female, reports that when she walked into the kitchen, felt off balance and fell.  Patient denies any dizziness or prodromal symptoms prior to the fall.  Patient is not sure if she has lost consciousness.  Patient denies hitting her head.  Patient did not have any similar episodes in the recent past.  No concern for chest pain nausea vomiting abdominal pain fever chills diarrhea in the recent past.  No sick contacts noted.  Patient has a prior history of SVT, upon chart review workup was done in 2022 with echo as below    ECHO 2022:  Hyperdynamic left ventricular function  The visual ejection fraction is >70%.  No regional wall motion abnormalities noted.  Mild (35-45mmHg) pulmonary hypertension is present.  There is no comparison study available.    In the ED upon initial presentation patient was hypertensive and tachycardic with blood pressure systolic 163, heart rate 135.  Lab workup BMP within normal limits, glucose slightly elevated, troponin initially 42 recheck 90.  Elevated WBC count at 16.5, hemoglobin 13.5, D-dimer 14.10.    Patient was seen and evaluated with work up in ED:   CT chest PE:  1.  No pulmonary emboli. No acute aortic syndrome.  2.  CT signs of potential mild nonspecific bronchial inflammation and multifocal air trapping.  3.  Mild calcified atheromatous plaque LAD and  circumflex coronary arteries.  4.  Bones are demineralized with mild chronic vertebral body compression T3 and T6.    CT lumbar spine  1.  No acute fracture.  2.  Chronic spine changes described above.      CT cervical spine  1.  T3 vertebral body superior endplate partially visualized mild to moderate compression deformity age-indeterminate. This is better visualized on the CT chest performed concomitantly.  2.  No acute fracture within the cervical spine.    3.  Chronic spine changes described above    CT head non contrast  1.  No acute intracranial process.  2.  Chronic intracranial changes described above.    Due to elevation in troponin, IV heparin was initiated in the ER and patient was admitted for further evaluation management.    Past Medical History    Past Medical History:   Diagnosis Date    Arm fracture, right 12/11    Essential hypertension, benign     Hypertension     PAC (premature atrial contraction)     Pneumonia 1988    PVC (premature ventricular contraction)     Recurrent Otitis media        Past Surgical History   Past Surgical History:   Procedure Laterality Date    OPEN REDUCTION INTERNAL FIXATION HUMERUS PROXIMAL  12/27/2011    Procedure:OPEN REDUCTION INTERNAL FIXATION HUMERUS PROXIMAL; OPEN REDUCTION INTERNAL FIXATION RIGHT PROXIMAL HUMERUS ; Surgeon:OCTAVIO WRIGHT; Location:[facility] OR    TONSILLECTOMY  as child       Prior to Admission Medications   Prior to Admission Medications   Prescriptions Last Dose Informant Patient Reported? Taking?   Calcium Carbonate-Vitamin D (CALCIUM + D PO) More than a month  Yes Yes   Sig: Take by mouth twice a week   Cholecalciferol (VITAMIN D PO) Past Month  Yes Yes   Sig: Take by mouth twice a week   NONFORMULARY 2/16/2025  Yes Yes   Sig: Place 1 drop into both eyes at bedtime. Over the counter eye drop for dry eyes prescribed after cataract surgery   atenolol (TENORMIN) 50 MG tablet 2/16/2025 Bedtime  No Yes   Sig: Take 1 tablet (50 mg) by mouth 2 times  daily.   Patient taking differently: Take 50 mg by mouth at bedtime.   losartan (COZAAR) 25 MG tablet   No No   Sig: Take 0.5 tablets (12.5 mg) by mouth daily.      Facility-Administered Medications: None        Review of Systems    The 10 point Review of Systems is negative other than noted in the HPI or here.     Social History   I have reviewed this patient's social history and updated it with pertinent information if needed.  Social History     Tobacco Use    Smoking status: Never    Smokeless tobacco: Never   Vaping Use    Vaping status: Never Used   Substance Use Topics    Alcohol use: No    Drug use: No         Family History   I have reviewed this patient's family history and updated it with pertinent information if needed.  Family History   Problem Relation Age of Onset    Cerebrovascular Disease Mother         age 83    Cerebrovascular Disease Father         age  82         Allergies   Allergies   Allergen Reactions    Sulfa Antibiotics Rash    Hctz      Cough, dehydration, CROSS-REACTION with sulfa allergy      Amlodipine      Patient states that she has allergy to sulfa and amlodipine does have sulfa component and it on the formula and does not want to take it.    Lisinopril      Pt states she developed memory loss after starting Lisinopril and do not want to take it.     Darvon [Propoxyphene Hcl] Other (See Comments)     Stiff neck          Physical Exam   Vital Signs: Temp: 99.1  F (37.3  C) Temp src: Oral BP: 135/47 Pulse: 72   Resp: 17 SpO2: 96 % O2 Device: None (Room air)    Weight: 88 lbs 12.8 oz    Constitutional: Elderly female, frail awake, alert, cooperative, no apparent distress.  Eyes: Conjunctiva and pupils examined and normal.  HEENT: Moist mucous membranes, normal dentition.  Respiratory: Clear to auscultation bilaterally, no crackles or wheezing.  Cardiovascular: Regular rate and rhythm, normal S1 and S2, and no murmur noted.  GI: Soft, non-distended, non-tender, normal bowel  sounds.  Lymph/Hematologic: No anterior cervical or supraclavicular adenopathy.  Skin: No rashes, no cyanosis, no edema.  Musculoskeletal: No joint swelling, erythema or tenderness.  Neurologic: Cranial nerves 2-12 intact, normal strength and sensation.  Psychiatric: Alert, oriented to person, place and time, no obvious anxiety or depression.

## 2025-02-18 NOTE — PHARMACY-ADMISSION MEDICATION HISTORY
Pharmacist Admission Medication History    Admission medication history is complete. The information provided in this note is only as accurate as the sources available at the time of the update.    Information Source(s): Patient and CareEverywhere/SureScripts via in-person    Pertinent Information:  [facility] -provider ordered OTC eye drop for pt after cataract surgery, but the patient does not remember what it is.  -Pt never started Losartan.     Changes made to PTA medication list:  Added: eye drop  Deleted: None  Changed: atenolol to daily    Allergies reviewed with patient and updates made in EHR: yes    Medication History Completed By: Eliza Coronado RPH 2/17/2025 10:01 PM    PTA Med List   Medication Sig Last Dose/Taking    atenolol (TENORMIN) 50 MG tablet Take 1 tablet (50 mg) by mouth 2 times daily. (Patient taking differently: Take 50 mg by mouth at bedtime.) 2/16/2025 Bedtime    Calcium Carbonate-Vitamin D (CALCIUM + D PO) Take by mouth twice a week More than a month    Cholecalciferol (VITAMIN D PO) Take by mouth twice a week Past Month    NONFORMULARY Place 1 drop into both eyes at bedtime. Over the counter eye drop for dry eyes prescribed after cataract surgery 2/16/2025

## 2025-02-18 NOTE — PLAN OF CARE
Goal Outcome Evaluation:      Plan of Care Reviewed With: patient, child    FENG Tran  [facility]  Inpatient Care Management

## 2025-02-18 NOTE — PROGRESS NOTES
[facility]  ED Nurse Handoff Report    ED Chief complaint: Generalized Weakness and Fall      ED Diagnosis:   Final diagnoses:   NSTEMI (non-ST elevated myocardial infarction) (H)       Code Status: to be discussed with provider - previous code was Full Code    Allergies:   Allergies   Allergen Reactions    Sulfa Antibiotics Rash    Hctz      Cough, dehydration, CROSS-REACTION with sulfa allergy      Amlodipine      Patient states that she has allergy to sulfa and amlodipine does have sulfa component and it on the formula and does not want to take it.    Lisinopril      Pt states she developed memory loss after starting Lisinopril and do not want to take it.     Darvon [Propoxyphene Hcl] Other (See Comments)     Stiff neck         Patient Story: Patient had a fall today at 0800 am where she hit her heat. She was not sure if she lost consciousness.  Focused Assessment:  Patient reports having an unsteady gait for a couple days and having to use the walker. Patient was found to have an NSTEMI.    Treatments and/or interventions provided: Labs drawn, imaging done, and medications given.  Patient's response to treatments and/or interventions: Patient tolerated interventions well.    To be done/followed up on inpatient unit:  Follow plan of care. Educate on the importance of medication compliance.    Does this patient have any cognitive concerns?: Short term memory loss    Activity level - Baseline/Home:  Walker  Activity Level - Current:   Stand with assist x2    Patient's Preferred language: English   Needed?: No    Isolation: None  Infection: Not Applicable  Patient tested for COVID 19 prior to admission: NO  Bariatric?: No    Vital Signs:   Vitals:    02/17/25 1747 02/17/25 1759 02/17/25 1814 02/17/25 1925   BP:  (!) 159/59  (!) 164/69   Pulse:  76 72 71   Resp:  14 (!) 7 16   Temp:    99.1  F (37.3  C)   TempSrc:    Oral   SpO2:    99%   Weight: 40.3 kg (88 lb 12.8 oz)      Height: 1.524  m (5')          Cardiac Rhythm:     Was the PSS-3 completed:   Yes  What interventions are required if any?  None needed             Family Comments: Daughter at bedside and supportive in cares  OBS brochure/video discussed/provided to patient/family: No              Name of person given brochure if not patient: n/a              Relationship to patient:     For the majority of the shift this patient's behavior was Green.   Behavioral interventions performed were none needed.    ED NURSE PHONE NUMBER: [phone]

## 2025-02-18 NOTE — ED TRIAGE NOTES
Patient fell and hit her head today at 0800 am. She doesn't remember if she lost consciousness. Patient reports that she she has had an unsteady gait for the past couple of days and has had to use a walker at home. Patient was found to have an NSTEMI.     Triage Assessment (Adult)       Row Name 02/17/25 1730 02/17/25 1300       Triage Assessment    Airway WDL WDL WDL       Respiratory WDL    Respiratory WDL WDL WDL       Skin Circulation/Temperature WDL    Skin Circulation/Temperature WDL WDL WDL       Cardiac WDL    Cardiac WDL X  NSTEMI today WDL       Peripheral/Neurovascular WDL    Peripheral Neurovascular WDL WDL WDL       Cognitive/Neuro/Behavioral WDL    Cognitive/Neuro/Behavioral WDL WDL WDL       Pupils (CN II)    Pupil PERRLA yes --    Pupil Size Left 2 mm --    Pupil Size Right 2 mm --       Bess Coma Scale    Best Eye Response 4-->(E4) spontaneous --    Best Motor Response 6-->(M6) obeys commands --    Best Verbal Response 5-->(V5) oriented --    [place] Coma Scale Score 15 --

## 2025-02-18 NOTE — CONSULTS
Care Management Initial Consult    General Information  Assessment completed with: Patient, Children, Kenisha Jones  Type of CM/SW Visit: Initial Assessment    Primary Care Provider verified and updated as needed: Yes   Readmission within the last 30 days: no previous admission in last 30 days      Reason for Consult: discharge planning  Advance Care Planning: Advance Care Planning Reviewed: verified with patient (Daughter (Kenisha) has copy with her)          Communication Assessment  Patient's communication style: spoken language (English or Bilingual)             Cognitive  Cognitive/Neuro/Behavioral: WDL                      Living Environment:   People in home: alone     Current living Arrangements: condominium      Able to return to prior arrangements: yes       Family/Social Support:  Care provided by: self, child(shira)  Provides care for: no one  Marital Status:   Support system: Children          Description of Support System: Supportive, Involved    Support Assessment: Adequate family and caregiver support    Current Resources:   Patient receiving home care services: No        Community Resources: None  Equipment currently used at home: none  Supplies currently used at home: None    Employment/Financial:  Employment Status: retired        Financial Concerns: none   Referral to Financial Worker: No       Does the patient's insurance plan have a 3 day qualifying hospital stay waiver?  No    Lifestyle & Psychosocial Needs:  Social Drivers of Health     Food Insecurity: Low Risk  (8/21/2024)    Food Insecurity     Within the past 12 months, did you worry that your food would run out before you got money to buy more?: No     Within the past 12 months, did the food you bought just not last and you didn t have money to get more?: No   Depression: Not at risk (8/21/2024)    PHQ-2     PHQ-2 Score: 0   Housing Stability: Low Risk  (8/21/2024)    Housing Stability     Do you have housing? : Yes     Are you  worried about losing your housing?: No   Tobacco Use: Low Risk  (8/21/2024)    Patient History     Smoking Tobacco Use: Never     Smokeless Tobacco Use: Never     Passive Exposure: Not on file   Financial Resource Strain: Low Risk  (8/21/2024)    Financial Resource Strain     Within the past 12 months, have you or your family members you live with been unable to get utilities (heat, electricity) when it was really needed?: No   Alcohol Use: Not on file   Transportation Needs: Low Risk  (8/21/2024)    Transportation Needs     Within the past 12 months, has lack of transportation kept you from medical appointments, getting your medicines, non-medical meetings or appointments, work, or from getting things that you need?: No   Physical Activity: Insufficiently Active (8/21/2024)    Exercise Vital Sign     Days of Exercise per Week: 1 day     Minutes of Exercise per Session: 20 min   Interpersonal Safety: Not on file   Stress: No Stress Concern Present (8/21/2024)    [employer] - Occupational Stress Questionnaire     Feeling of Stress : Only a little   Social Connections: Unknown (8/21/2024)    Social Connection and Isolation Panel [NHANES]     Frequency of Communication with Friends and Family: Not on file     Frequency of Social Gatherings with Friends and Family: Twice a week     Attends [employer] Services: Not on file     Active Member of Clubs or Organizations: Not on file     Attends Club or Organization Meetings: Not on file     Marital Status: Not on file   Health Literacy: Not on file       Functional Status:  Prior to admission patient needed assistance:   Dependent ADLs:: Independent  Dependent IADLs:: Independent, Shopping, Transportation  Assesssment of Functional Status: Not at baseline with ADL Functioning    Mental Health Status:          Chemical Dependency Status:                Values/Beliefs:  Spiritual, Cultural Beliefs, Amish Practices, Values that affect care: no           Values/Beliefs Comment: Jaspal    Discussed  Partnership in Safe Discharge Planning  document with patient/family: No    Additional Information:   reviewed chart and completed initial consult with patient and patient's daughter, Kenisha, at bedside. Patient confirmed she lives by herself in a condo at [address], [address]. Patient confirmed she sees Dr. Garza for primary care. Patient confirmed her daughter Sallie as emergency contact. Kenisha ([phone]) stated she can also be listed as emergency contact. Patient's spouse has passed away and can be removed from list. Writer stated he would update. Writer noted no health care directive on file. Kenisha provided copy and stated herself and sister Sallie are named health care agents. Writer encouraged Kenisha to provide copy to primary care physician. Patient confirmed insurance is through Medicare and [facility]/Medicare Supplement. Patient is independent in daily cares at a baseline, and receives assistance from daughters when needed. Patient does not need equipment to ambulate. Family has walker in case. Patient is retired and relies on Social Security, pension, and survivor's benefit from late  for income. Patient not receiving additional in home services at this time. No questions or concerns regarding discharge cited during initial consult. Writer updated contact list as requested.     Next Steps: Follow for discharge planning; Review recommendations from physician and therapies; Confirm transportation w/family    FENG Tran  [facility]  Inpatient Care Management

## 2025-02-18 NOTE — CONSULTS
"[facility]    CARDIOLOGY CONSULTATION       Date of Admission:  2/17/2025    Assessment & Plan       Fall/presyncope: Consistent most with vasovagal/orthostatic by history  Mildly elevated high-sensitivity troponin and slight worsening of pre-existing ECG changes: Most consistent with demand ischemia  Mild coronary calcification on CT  Mild pulmonary hypertension by echo  Right greater than left carotid bruit  Symptomatic PACs/PVCs, following with EP  Leukocytosis      Discussed case with patient and hospital medicine physician.  By description, this seems less consistent with a cardiac etiology of syncope.  Suspect most likely a \"common faint\": Likely vasovagal.  Her medical troponin elevation/ECG changes most likely represent demand ischemia.  Given lack of any anginal symptoms, this can be discussed as an outpatient and can consider outpatient ischemic evaluation.  As an inpatient, would recommend repeat echocardiogram and carotid duplex ultrasound.  If these are unremarkable, and no significant dysrhythmia is seen on telemetry, it is reasonable for her to be discharged with outpatient cardiology follow-up and 7-day Zio patch.  If significant dysrhythmia is seen at some point during her stay, would recommend MCOT monitor rather than Zio patch, but given low suspicion for arrhythmia I think a Zio patch is likely reasonable.      -Follow-up TTE results  -Bilateral carotid duplex ultrasound (ordered)  -Continue to monitor on telemetry while hospitalized  -If above testing is all unremarkable, is likely appropriate for discharge from cardiology standpoint, and can be discharged with 7-day Zio patch (with caveat as above)  -Outpatient cardiology follow-up will be arranged.  Can discuss pros/cons of pursuing ischemic evaluation as outpatient.        Thank you for the interesting consult.  If above testing is unremarkable, cardiology will sign off at this time.      High complexity     Gerald" "ROC Mendez MD        Reason for Consult   Reason for consult: Patient is being evaluated for elevated troponin, syncope.    History of Present Illness   Karen Sanchez is a 84 year old female here after a fall.  She tells me that she had gotten up from a chair and was feeling a little bit lightheaded when she stood up, but this went away quickly.  She walked to her kitchen and began preparing food, but while standing she felt \"off\" and fell to the floor.  She does not think that she lost consciousness, or if she did it was very brief.  She denies any chest pains, shortness of breath, nausea, significant palpitations, or other similar syncopal/presyncopal episodes.  She currently feels fine.    Cardiology is consulted due to mildly elevated high-sensitivity troponin with initial of 42 and repeat of 90.  Her D-dimer was severely elevated at 14.1 but a CT PE protocol showed no evidence of pulmonary embolism with some mild nonspecific bronchial inflammation.  ECG did show some worsening of her pre-existing inferolateral ST/T wave abnormalities.  Echocardiogram is ordered and pending.  Her prior echo from 11/2022 was unremarkable other than mild hypertension.  Her most recent ischemic evaluation was an exercise stress echo from 2011 which was essentially normal.      Prior to Admission Medications   Prior to Admission Medications   Prescriptions Last Dose Informant Patient Reported? Taking?   Calcium Carbonate-Vitamin D (CALCIUM + D PO) More than a month  Yes Yes   Sig: Take by mouth twice a week   Cholecalciferol (VITAMIN D PO) Past Month  Yes Yes   Sig: Take by mouth twice a week   NONFORMULARY 2/16/2025  Yes Yes   Sig: Place 1 drop into both eyes at bedtime. Over the counter eye drop for dry eyes prescribed after cataract surgery   atenolol (TENORMIN) 50 MG tablet 2/16/2025 Bedtime  No Yes   Sig: Take 1 tablet (50 mg) by mouth 2 times daily.   Patient taking differently: Take 50 mg by mouth at bedtime.   losartan " (COZAAR) 25 MG tablet   No No   Sig: Take 0.5 tablets (12.5 mg) by mouth daily.      Facility-Administered Medications: None     Current Facility-Administered Medications   Medication Dose Route Frequency Provider Last Rate Last Admin    atenolol (TENORMIN) tablet 50 mg  50 mg Oral At Bedtime Triston Quan MD        calcium carbonate (TUMS) chewable tablet 1,000 mg  1,000 mg Oral 4x Daily PRN Triston Quan MD        lidocaine (LMX4) cream   Topical Q1H PRN Triston Quan MD        lidocaine 1 % 0.1-1 mL  0.1-1 mL Other Q1H PRN Triston Quan MD        losartan (COZAAR) half-tab 12.5 mg  12.5 mg Oral Daily Triston Quan MD   12.5 mg at 02/18/25 0752    ondansetron (ZOFRAN ODT) ODT tab 4 mg  4 mg Oral Q6H PRN Triston Quan MD        Or    ondansetron (ZOFRAN) injection 4 mg  4 mg Intravenous Q6H PRN Triston Quan MD        Patient is already receiving anticoagulation with heparin, enoxaparin (LOVENOX), warfarin (COUMADIN)  or other anticoagulant medication   Does not apply Continuous PRN Triston Quan MD        senisatu-docusate (SENOKOT-S/PERICOLACE) 8.6-50 MG per tablet 1 tablet  1 tablet Oral BID PRN Triston Quan MD        Or    senna-docusate (SENOKOT-S/PERICOLACE) 8.6-50 MG per tablet 2 tablet  2 tablet Oral BID PRN Triston Quan MD        sodium chloride (PF) 0.9% PF flush 3 mL  3 mL Intracatheter Q8H Triston Quan MD        sodium chloride (PF) 0.9% PF flush 3 mL  3 mL Intracatheter q1 min prn Triston Quan MD         Current Outpatient Medications   Medication Sig Dispense Refill    atenolol (TENORMIN) 50 MG tablet Take 1 tablet (50 mg) by mouth 2 times daily. (Patient taking differently: Take 50 mg by mouth at bedtime.) 180 tablet 3    Calcium Carbonate-Vitamin D (CALCIUM + D PO) Take by mouth twice a week      Cholecalciferol (VITAMIN D PO) Take by mouth twice a week      NONFORMULARY Place 1 drop into both eyes at bedtime. Over the counter eye drop for dry eyes prescribed  after cataract surgery      losartan (COZAAR) 25 MG tablet Take 0.5 tablets (12.5 mg) by mouth daily. 45 tablet 1     Current Facility-Administered Medications   Medication Dose Route Frequency Provider Last Rate Last Admin    atenolol (TENORMIN) tablet 50 mg  50 mg Oral At Bedtime Triston Quan MD        calcium carbonate (TUMS) chewable tablet 1,000 mg  1,000 mg Oral 4x Daily PRN Triston Quan MD        lidocaine (LMX4) cream   Topical Q1H PRN Triston Quan MD        lidocaine 1 % 0.1-1 mL  0.1-1 mL Other Q1H PRN Triston Quan MD        losartan (COZAAR) half-tab 12.5 mg  12.5 mg Oral Daily Triston Quan MD   12.5 mg at 02/18/25 0752    ondansetron (ZOFRAN ODT) ODT tab 4 mg  4 mg Oral Q6H PRN Triston Quan MD        Or    ondansetron (ZOFRAN) injection 4 mg  4 mg Intravenous Q6H PRN Triston Quan MD        Patient is already receiving anticoagulation with heparin, enoxaparin (LOVENOX), warfarin (COUMADIN)  or other anticoagulant medication   Does not apply Continuous PRN Triston Quan MD        senna-docusate (SENOKOT-S/PERICOLACE) 8.6-50 MG per tablet 1 tablet  1 tablet Oral BID PRN Triston Quan MD        Or    senna-docusate (SENOKOT-S/PERICOLACE) 8.6-50 MG per tablet 2 tablet  2 tablet Oral BID PRN Triston Quan MD        sodium chloride (PF) 0.9% PF flush 3 mL  3 mL Intracatheter Q8H Triston Quan MD        sodium chloride (PF) 0.9% PF flush 3 mL  3 mL Intracatheter q1 min prn Triston Quan MD         Current Outpatient Medications   Medication Sig Dispense Refill    atenolol (TENORMIN) 50 MG tablet Take 1 tablet (50 mg) by mouth 2 times daily. (Patient taking differently: Take 50 mg by mouth at bedtime.) 180 tablet 3    Calcium Carbonate-Vitamin D (CALCIUM + D PO) Take by mouth twice a week      Cholecalciferol (VITAMIN D PO) Take by mouth twice a week      NONFORMULARY Place 1 drop into both eyes at bedtime. Over the counter eye drop for dry eyes prescribed after cataract  surgery      losartan (COZAAR) 25 MG tablet Take 0.5 tablets (12.5 mg) by mouth daily. 45 tablet 1     Allergies   Allergies   Allergen Reactions    Sulfa Antibiotics Rash    Hctz      Cough, dehydration, CROSS-REACTION with sulfa allergy      Amlodipine      Patient states that she has allergy to sulfa and amlodipine does have sulfa component and it on the formula and does not want to take it.    Lisinopril      Pt states she developed memory loss after starting Lisinopril and do not want to take it.     Darvon [Propoxyphene Hcl] Other (See Comments)     Stiff neck           Physical Exam   Vital Signs with Ranges  Temp:  [97.9  F (36.6  C)-99.6  F (37.6  C)] 99.6  F (37.6  C)  Pulse:  [] 66  Resp:  [7-18] 18  BP: (135-167)/(47-79) 142/57  SpO2:  [96 %-100 %] 97 %  Wt Readings from Last 4 Encounters:   02/17/25 40.3 kg (88 lb 12.8 oz)   08/21/24 41.4 kg (91 lb 4.8 oz)   02/22/24 44.4 kg (97 lb 12.8 oz)   12/21/23 43.5 kg (96 lb)     No intake/output data recorded.      Vitals: BP (!) 142/57   Pulse 66   Temp 99.6  F (37.6  C)   Resp 18   Ht 1.524 m (5')   Wt 40.3 kg (88 lb 12.8 oz)   SpO2 97%   BMI 17.34 kg/m      Physical Exam:   Constitutional: Frail elderly female in no acute distress  Respiratory: Normal respiratory effort, CTAB  Cardiovascular: RRR, no significant m/r/g.  JVP < 7 cm H2O.  There is trace bilateral LE edema.  Moderate right carotid bruit, faint left carotid bruit      Clinically Significant Risk Factors Present on Admission               # Hypoalbuminemia: Lowest albumin = 3.3 g/dL at 2/18/2025  6:37 AM, will monitor as appropriate     # Hypertension: Noted on problem list           # Cachexia: Estimated body mass index is 17.34 kg/m  as calculated from the following:    Height as of this encounter: 1.524 m (5').    Weight as of this encounter: 40.3 kg (88 lb 12.8 oz).             Native vessel CAD    Pulmonary Heart Disease (Pulmonary hypertension or Cor pulmonale): Pulmonary  Hypertension, unspecified    Chronic Fatigue and Other Debilities: Age-related physical debility

## 2025-02-18 NOTE — PROGRESS NOTES
[facility]    Hospitalist Progress Note    Brief Summary:  This is a 84-year-old female with history of hypertension, SVT/PVC on atenolol presented to the ED with complaint of fall.    Assessment & Plan       Syncopal episode most likely vasovagal  Rule out rule  orthostatic hypotension   This is a 84-year-old female with history of hypertension, history of SVT and PVC in the past on atenolol chronically at nighttime, presented with history of fall and passing out event.  She felt not right, unable to explain what she means, she was hanging onto things in the kitchen and then found herself on the floor and bump her head on the back.  She was not confused, she was able to get up and sit on a chair and called her daughter to bring her to the hospital.  She had extensive workup done in the ED including CT scan of the chest PE protocol which was negative for any pulm embolism, aortic dissection,  No infiltrate, pleural effusion or pneumothorax.  CT head negative for any acute process including hemorrhage, stroke or mass lesion  CT cervical spine negative for any acute fracture  CT lumbar spine negative for any acute fracture    I think her history is consistent with most likely vasovagal syncope, arrhythmia cannot be ruled out completely.  I will asked the nurse to check her orthostatic blood pressure lying sitting and standing up.  Will do echocardiogram, and ultrasound of the carotid artery.  To complete the workup  Mildly elevated troponin most likely secondary NSTEMI type II demand ischemia.  Overall stable at this time keep on telemetry for now  With PT and OT evaluate the patient.   will be consulted for safe disposition as she lives by herself.         NSTEMI type II  Patient no chest pain no palpitation, EKG unchanged from previously.  Mildly elevated troponin which remained flat recheck troponin this morning remain flat as well.  I think mildly elevated troponin is because  of demand ischemia because of fall and stress  CT scan does show some mild calcified atheromatous plaque in the LAD and circumflex coronary arteries  Previous echocardiogram in 2022 shows EF of more than 70% no regional wall motion abnormality  Mild pulmonary hypertension at that time.  Dmitriy echocardiogram order  She was started on IV heparin I will discontinue IV heparin no active chest pain troponins flat.  Cardiology is consulted, appreciate their input.    HTN:  -PTA Atenolol 50 mg at at bedtime and Losartan 12.5 mg daily  Patient was starting taking losartan yet.  Check orthostatic blood pressure.    Patient takes atenolol 50 at bedtime not twice daily.        Leucocytosis: Likely stress related improving  -no infectious etiology noted,no symptoms,imaging negative  -no Abx at this time  -likely stress related  Leukocytosis trending down.             Clinically Significant Risk Factors Present on Admission               # Hypoalbuminemia: Lowest albumin = 3.3 g/dL at 2/18/2025  6:37 AM, will monitor as appropriate     # Hypertension: Noted on problem list           # Cachexia: Estimated body mass index is 17.34 kg/m  as calculated from the following:    Height as of this encounter: 1.524 m (5').    Weight as of this encounter: 40.3 kg (88 lb 12.8 oz).                DVT Prophylaxis: Pneumatic Compression Devices  Code Status: Full Code        Medically Ready for Discharge: Anticipated in 2-4 Days        Elpidio Briggs MD, MD  Text Page  (7am - 6pm)    Interval History   Patient seen and evaluated this morning overall feeling better denies any headache fever chills chest pain palpitation dysuria materia constipation diarrhea  She does feel somewhat dizzy when sitting up from the lying position.  She does not feel comfortable getting up by herself at this time    No other significant event overnight    -Data reviewed today: I reviewed all new labs and imaging results over the last 24 hours. I personally reviewed no  images or EKG's today.    Physical Exam   Temp: 99.6  F (37.6  C) Temp src: Oral BP: (!) 142/57 Pulse: 66   Resp: 18 SpO2: 97 % O2 Device: None (Room air)    Vitals:    02/17/25 1747   Weight: 40.3 kg (88 lb 12.8 oz)     Vital Signs with Ranges  Temp:  [97.9  F (36.6  C)-99.6  F (37.6  C)] 99.6  F (37.6  C)  Pulse:  [] 66  Resp:  [7-18] 18  BP: (135-167)/(47-79) 142/57  SpO2:  [96 %-100 %] 97 %  No intake/output data recorded.    Constitutional: awake, alert, cooperative, no apparent distress, and appears stated age  Eyes: Lids and lashes normal, pupils equal, round and reactive to light, extra ocular muscles intact, sclera clear, conjunctiva normal  Respiratory: No increased work of breathing, good air exchange, clear to auscultation bilaterally, no crackles or wheezing  Cardiovascular: Normal apical impulse, regular rate and rhythm, normal S1 and S2, no S3 or S4, and no murmur noted  GI: No scars, normal bowel sounds, soft, non-distended, non-tender, no masses palpated, no hepatosplenomegally  Musculoskeletal: no lower extremity pitting edema present  Neurologic: No focal deficit    Medications   Current Facility-Administered Medications   Medication Dose Route Frequency Provider Last Rate Last Admin    Patient is already receiving anticoagulation with heparin, enoxaparin (LOVENOX), warfarin (COUMADIN)  or other anticoagulant medication   Does not apply Continuous PRN Triston Quan MD         Current Facility-Administered Medications   Medication Dose Route Frequency Provider Last Rate Last Admin    atenolol (TENORMIN) tablet 50 mg  50 mg Oral At Bedtime Triston Quan MD        losartan (COZAAR) half-tab 12.5 mg  12.5 mg Oral Daily Triston Quan MD   12.5 mg at 02/18/25 0752    sodium chloride (PF) 0.9% PF flush 3 mL  3 mL Intracatheter Q8H Triston Quan MD   3 mL at 02/18/25 1040       Data   Recent Labs   Lab 02/18/25  0637 02/17/25  1311   WBC 12.7* 16.5*   HGB 12.0 13.5   MCV 87 88     222    140   POTASSIUM 4.1 4.2   CHLORIDE 106 102   CO2 26 29   BUN 18.6 17.9   CR 0.84 0.81   ANIONGAP 8 9   BATSHEVA 8.7* 9.8   * 177*   ALBUMIN 3.3* 4.0   PROTTOTAL 5.6* 6.7   BILITOTAL 0.6 0.3   ALKPHOS 63 76   ALT 21 25   AST 28 30       Recent Results (from the past 24 hours)   Head CT w/o contrast    Narrative    EXAM: CT HEAD W/O CONTRAST  LOCATION: [facility]  DATE: 2/17/2025    INDICATION: fall, head trauma  COMPARISON: None.  TECHNIQUE: Routine CT Head without IV contrast. Multiplanar reformats. Dose reduction techniques were used.    FINDINGS:  INTRACRANIAL CONTENTS: No intracranial hemorrhage, extraaxial collection, or mass effect.  No CT evidence of acute infarct. Mild presumed chronic small vessel ischemic changes. Normal ventricles and sulci.     Left frontoparietal white matter patchy hypoattenuation nonspecific likely due to chronic infarcts.    VISUALIZED ORBITS/SINUSES/MASTOIDS: No intraorbital abnormality. No paranasal sinus mucosal disease. No middle ear or mastoid effusion.    BONES/SOFT TISSUES: No acute abnormality.      Impression    IMPRESSION:  1.  No acute intracranial process.    2.  Chronic intracranial changes described above.   CT Cervical Spine w/o Contrast    Narrative    EXAM: CT CERVICAL SPINE W/O CONTRAST  LOCATION: [facility]  DATE: 2/17/2025    INDICATION: fall, head trauma  COMPARISON: None.  TECHNIQUE: Routine CT Cervical Spine without IV contrast. Multiplanar reformats. Dose reduction techniques were used.    FINDINGS:  VERTEBRA:   T3 vertebral body superior endplate partially visualized mild to moderate compression deformity age-indeterminate. This is better visualized on the CT chest performed concomitantly.    No acute fracture within the cervical spine.  No acute post traumatic subluxations.   Remaining vertebral body heights within normal limits. No prevertebral soft tissue swelling. Diffuse bony  demineralization.    CANAL/FORAMINA: Multilevel spondylosis.  No significant central canal stenosis.  Various levels and degrees of neural foraminal stenosis.  No significant subluxations.    PARASPINAL: No significant extraspinal abnormality.      Impression     IMPRESSION:  1.  T3 vertebral body superior endplate partially visualized mild to moderate compression deformity age-indeterminate. This is better visualized on the CT chest performed concomitantly.    2.  No acute fracture within the cervical spine.      3.  Chronic spine changes described above.   CT Lumbar Spine w/o Contrast    Narrative    EXAM: CT LUMBAR SPINE W/O CONTRAST  LOCATION: [facility]  DATE: 2/17/2025    INDICATION: low back pain s p fall  COMPARISON: None.  TECHNIQUE: Routine CT Lumbar Spine without IV contrast. Multiplanar reformats. Dose reduction techniques were used.     FINDINGS:  S1-S2 disc space.    VERTEBRA: No acute fracture.  No acute post traumatic subluxations.   Normal vertebral body heights. Diffuse bony demineralization.    CANAL/FORAMINA: Lumbar levoscoliosis. Multilevel spondylosis. Various levels and degrees of central canal stenosis.  No critical central canal stenosis.  Various levels and degrees of neural foraminal stenosis. L1-L2 mild degenerative grade 1   retrolisthesis measuring 1 mm. L5-S1 grade 1 anterolisthesis measuring 3 mm. Bilateral SI degenerative joint disease.    PARASPINAL: No significant extraspinal abnormality.      Impression     IMPRESSION:  1.  No acute fracture.    2.  Chronic spine changes described above.   CT Chest Pulmonary Embolism w Contrast    Narrative    EXAM: CT CHEST PULMONARY EMBOLISM W CONTRAST  LOCATION: [facility]  DATE: 02/17/2025    INDICATION: Fall. Elevated D-dimer.  COMPARISON: None.  TECHNIQUE: CT chest pulmonary angiogram during arterial phase injection of IV contrast. Multiplanar reformats and MIP reconstructions were  performed. Dose reduction techniques were used.   CONTRAST: 45 mL Isovue 370.    FINDINGS:  ANGIOGRAM CHEST: Pulmonary arteries are normal caliber and negative for pulmonary emboli. Thoracic aorta is negative for dissection. No CT evidence of right heart strain.    LUNGS AND PLEURA: Mild bronchial wall thickening and mosaic attenuation pattern throughout both lungs could indicate mild nonspecific bronchial inflammation and multifocal air trapping. Lungs otherwise clear. No pleural effusion. No pneumothorax.    MEDIASTINUM/AXILLAE: Heart size normal. No pericardial effusion. No lymphadenopathy. Benign calcified left hilar lymph nodes.    CORONARY ARTERY CALCIFICATION: Mild calcified atheromatous plaque LAD and circumflex coronary arteries.    UPPER ABDOMEN: Unremarkable.    MUSCULOSKELETAL: No acute fracture. Bones are demineralized. Healed fracture proximal right humerus status post ORIF with plate and screw. Mild chronic vertebral body compression T3 and T6.      Impression    IMPRESSION:  1.  No pulmonary emboli. No acute aortic syndrome.  2.  CT signs of potential mild nonspecific bronchial inflammation and multifocal air trapping.  3.  Mild calcified atheromatous plaque LAD and circumflex coronary arteries.  4.  Bones are demineralized with mild chronic vertebral body compression T3 and T6.

## 2025-02-19 ENCOUNTER — APPOINTMENT (OUTPATIENT)
Dept: CARDIOLOGY | Facility: CLINIC | Age: 85
End: 2025-02-19
Attending: INTERNAL MEDICINE
Payer: COMMERCIAL

## 2025-02-19 ENCOUNTER — APPOINTMENT (OUTPATIENT)
Dept: PHYSICAL THERAPY | Facility: CLINIC | Age: 85
End: 2025-02-19
Attending: INTERNAL MEDICINE
Payer: COMMERCIAL

## 2025-02-19 LAB — LVEF ECHO: NORMAL

## 2025-02-19 PROCEDURE — G0378 HOSPITAL OBSERVATION PER HR: HCPCS

## 2025-02-19 PROCEDURE — 93306 TTE W/DOPPLER COMPLETE: CPT | Mod: 26 | Performed by: INTERNAL MEDICINE

## 2025-02-19 PROCEDURE — 97530 THERAPEUTIC ACTIVITIES: CPT | Mod: GP

## 2025-02-19 PROCEDURE — 97112 NEUROMUSCULAR REEDUCATION: CPT | Mod: GP

## 2025-02-19 PROCEDURE — 99232 SBSQ HOSP IP/OBS MODERATE 35: CPT | Performed by: STUDENT IN AN ORGANIZED HEALTH CARE EDUCATION/TRAINING PROGRAM

## 2025-02-19 PROCEDURE — 93306 TTE W/DOPPLER COMPLETE: CPT

## 2025-02-19 PROCEDURE — 97161 PT EVAL LOW COMPLEX 20 MIN: CPT | Mod: GP

## 2025-02-19 PROCEDURE — 999N000111 HC STATISTIC OT IP EVAL DEFER

## 2025-02-19 PROCEDURE — 250N000013 HC RX MED GY IP 250 OP 250 PS 637: Performed by: INTERNAL MEDICINE

## 2025-02-19 RX ORDER — MECLIZINE HCL 12.5 MG 12.5 MG/1
12.5 TABLET ORAL 3 TIMES DAILY PRN
Status: DISCONTINUED | OUTPATIENT
Start: 2025-02-19 | End: 2025-02-20 | Stop reason: HOSPADM

## 2025-02-19 RX ADMIN — ATENOLOL 50 MG: 50 TABLET ORAL at 21:38

## 2025-02-19 RX ADMIN — CARBOXYMETHYLCELLULOSE SODIUM 1 DROP: 5 SOLUTION/ DROPS OPHTHALMIC at 21:38

## 2025-02-19 RX ADMIN — LOSARTAN POTASSIUM 12.5 MG: 25 TABLET, FILM COATED ORAL at 08:31

## 2025-02-19 ASSESSMENT — ACTIVITIES OF DAILY LIVING (ADL)
ADLS_ACUITY_SCORE: 43

## 2025-02-19 NOTE — PROGRESS NOTES
Physical Therapy Daily Treatment      Visit Count: 5  Plan of Care Dates: Initial: 11/27/2017 Through: 2/19/2018     Insurance Information: WORK COMP INFORMATION:  WORK COMP CARRIER:  [employer]  CARRIER PHONE:  [phone]  :  [employer]   PHONE:  [phone]  DIAGNOSIS:  PELVIS FRACTURE  DATE OF INJURY:  03/15/2016  CLAIM #:  [id number]  STATUS:  OPEN  CALL REF#  JEN AT [facility] 11/20/17  : Ita Rosariotatianaronald  Phone #: [phone]  Next Referring Provider Visit: 12/5/17     Referred by: Obdulio Hurst PA-C  Medical Diagnosis (from order): Z96.642 S/P hip replacement, left   Treatment Diagnosis: Hip Symptoms with Pain, Impaired Range of Motion, Impaired Motor Function/Muscle Performance and Impaired Balance  Insurance: 1. [facility]  2. N/A     Date of Surgery: 11/1/17; Surgery performed: L anterior hip replacement; Physician Guidelines: no     Diagnosis Precautions: anterior total hip arthroplasty precautions   Chart reviewed: Relevant co-morbidities, allergies, tests and medications: High cholesterol      SUBJECTIVE   Squatting is improving with even weight bearing. Only able to sit comfortably for 30 min so is up and down throughout the day. Walking continues to be fatiguing but pain is decreased. Current pain: 4/10 in L hip.    OBJECTIVE     Functional Status:   Functional Activity Patient  Abilities  Date: 12/12/17 Job Demands  Client Report   Lift floor to waist  80#   Lift waist to chest  80#   Lift waist to overhead  -   Two Hand Carry 2x75' 20# 20#   Push/Pull 4x50' 62# 150-300#   Sustained overhead work  --   Sustained sitting  -   Sustained standing 30 min 7 hours   Sustained kneeling  -    Areas blank above not yet assessed due to not medically appropriate.  Will be assessed when appropriate.    Use of special tools or equipment requirements:     Treatment   Therapeutic Exercise:    Exercise: Reps HEP Comments   2Single leg balance x1min B  GTB  [facility]    Medicine Progress Note - Hospitalist Service    Date of Admission:  2/17/2025    Assessment & Plan   This is a 84-year-old female with history of hypertension, SVT/PVC on atenolol presented to the ED with complaint of fall.     Syncopal episode most likely vasovagal  Rule out rule  orthostatic hypotension   This is a 84-year-old female with history of hypertension, history of SVT and PVC in the past on atenolol chronically at nighttime, presented with history of fall and passing out event. She felt not right, unable to explain what she means, she was hanging onto things in the kitchen and then found herself on the floor and bump her head on the back. She was not confused, she was able to get up and sit on a chair and called her daughter to bring her to the hospital. She had extensive workup done in the ED including CT scan of the chest PE protocol which was negative for any pulm embolism, aortic dissection. No infiltrate, pleural effusion or pneumothorax. 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  - CT head negative for any acute process including hemorrhage, stroke or mass lesion  - CT cervical spine negative for any acute fracture  - CT lumbar spine negative for any acute fracture  - Start meclizine prn 2/19 and monitor response  - Follow up TTE  - Cardiac monitoring now discontinued   - Continue PT/OT  - Anticipate medically stable for discharge 2/20      NSTEMI type II  Patient no chest pain no palpitation, EKG unchanged from previously.  Mildly elevated troponin which remained flat recheck troponin this morning remain flat as well.  I think mildly elevated troponin is because of demand ischemia because of fall and stress  CT scan does show some mild calcified atheromatous plaque in the LAD and circumflex coronary arteries  Previous echocardiogram in 2022 shows EF of more than 70% no regional wall motion abnormality  Mild  pulmonary hypertension at that time.  Dmitriy echocardiogram order  She was started on IV heparin I will discontinue IV heparin no active chest pain troponins flat.  Cardiology is consulted, appreciate their input.     HTN:  PTA patient was taking atenolol 25 mg in AM and 50 mg in evening. Had not started taking losartan from med list. Previous provider made the following adjustments.  - Atenolol 50 mg at bedtime  - losartan 12.5 mg daily, if dizzy on 2/20 could consider going back to PTA atenolol 25 mg in AM and 50 mg in PM  - orthostatic vitals negative    Leucocytosis: improving  -no infectious etiology noted,no symptoms,imaging negative  - WBC trend 16.5 - 12.7           Diet: Regular Diet Adult    DVT Prophylaxis: Pneumatic Compression Devices  Penny Catheter: Not present  Lines: None     Cardiac Monitoring: ACTIVE order. Indication: AMI (NSTEMI/ STEMI) (48 hours)  Code Status: Full Code      Clinically Significant Risk Factors Present on Admission               # Hypoalbuminemia: Lowest albumin = 3.3 g/dL at 2/18/2025  6:37 AM, will monitor as appropriate     # Hypertension: Noted on problem list           # Cachexia: Estimated body mass index is 16.92 kg/m  as calculated from the following:    Height as of this encounter: 1.524 m (5').    Weight as of this encounter: 39.3 kg (86 lb 10.3 oz).         # Financial/Environmental Concerns: none         Social Drivers of Health    Physical Activity: Insufficiently Active (8/21/2024)    Exercise Vital Sign     Days of Exercise per Week: 1 day     Minutes of Exercise per Session: 20 min   Social Connections: Unknown (8/21/2024)    Social Connection and Isolation Panel [NHANES]     Frequency of Social Gatherings with Friends and Family: Twice a week          Disposition Plan     Medically Ready for Discharge: Anticipated Tomorrow         Lincoln Ellis DO  Hospitalist Service  [facility]  Securely message with [employer] (more info)  Text page  push/pull   Mini squats 20x x  weight shift to center - mirror assist   Seated hip IR/ER strengthening 15x B each x with yellow TB   Tandem balance  x  cone reaches   Standing marches  x    Standing hamstring curls   x    Standing hip abduction      Wall sit  x    Step ups   6 inch   Lateral walking    airex beam   Lateral step ups  15x B  6 in   SciFit   Level 5   Treadmill    1.3 mph   SB hamstring curls  x    Stool scoots 2x100'     Power tower 2x15 B SL @35     bike x5min  Level 4   Push/pull sled 2x4  25# added       ASSESSMENT   Patient did well with therex this date. He was able to tolerate increased weight with push/pull, 2 hand carry and squatting. Even weight bearing was improved this date with squat, so gave HEP to add weight to tolerance. Fatigued with single leg power tower with no increase in pain.    Pain after treatment: 2/10.  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  Result of above outlined education: Verbalizes understanding and Demonstrates understanding    Goals:       To be obtained by end of this plan of care:  1. Patient independent with modified and progressed home exercise program.  2. Patient will decrease involved hip pain to 3/10  to aid in community ambulation for activities of independent living, stair ambulation and squatting for lifting at work.   3. Patient will increase involved hip active range of motion to 40° to aid in community ambulation for activities of independent living, stair ambulation, sitting/standing and squatting for lifting at work.  4. Patient will increase involved hip strength to 4+/5 to aid in community ambulation for activities of independent living, stair ambulation, sitting/standing and squatting for lifting at work  5. Patient will tolerate 45 minutes of walking with minimal pain to return to normal job function.  6. Lower Extremity Functional Scale: Patient will complete form to reflect an improved score from initial  score of 26 to greater than or equal to 35 (0=extreme difficulty; 80=no difficulty) to indicate pt reported improvement in function/disability/impairment (minimal detectable change: 9 points).     PLAN   Continue to add weight to push/pull and carry, as tolerated    THERAPY DAILY BILLING   Primary Insurance:  [facility]  Secondary Insurance: N/A    Evaluation Procedures:  No evaluation codes were used on this date of service    Timed Procedures:  Therapeutic Exercise, 30 minutes    Untimed Procedures:  No untimed codes were used on this date of service    Total Treatment Time: 30 minutes    Physician Signature on file.    via [employer] Paging/Directory   ______________________________________________________________________    Interval History     - No acute events overnight  - Patient feeling more dizzy today and unsteady on her feet  - Has been ambulating with a walker  - I updated patient and her daughter at bedside     Physical Exam   Vital Signs: Temp: 97.3  F (36.3  C) Temp src: Oral BP: 127/43 Pulse: 63   Resp: 16 SpO2: 97 % O2 Device: None (Room air)    Weight: 86 lbs 10.25 oz    Constitutional: awake, alert, cooperative, no apparent distress, and appears stated age  Eyes: Lids and lashes normal, pupils equal round and reactive to light and accomodation   ENT: Normocephalic, without obvious abnormality, atraumatic  Respiratory: No increased work of breathing, good air exchange, clear to auscultation bilaterally, no crackles or wheezing  Cardiovascular: Normal apical impulse, regular rate and rhythm, normal S1 and S2, no S3 or S4, and no murmur noted  GI: No scars, normal bowel sounds, soft, non-distended, non-tender, no masses palpated, no hepatosplenomegally  Skin: normal skin color, texture, turgor  Musculoskeletal: No deformity   Neurologic: Awake, alert, oriented to name, place and time.  Cranial nerves II-XII are grossly intact.  Motor is 5 out of 5 bilaterally.  Symmetric upper and lower   Neuropsychiatric: General: normal, calm, and normal eye contact    Medical Decision Making       45 MINUTES SPENT BY ME on the date of service doing chart review, history, exam, documentation & further activities per the note.      Data   ------------------------- PAST 24 HR DATA REVIEWED -----------------------------------------------        Imaging results reviewed over the past 24 hrs:   Recent Results (from the past 24 hours)   Echocardiogram Complete   Result Value    LVEF  60-65%    Narrative    [id number]  [id number]  [id number]  [id number]^MARIA^JOHANN^LAYLA     [facility]  Echocardiography Laboratory  [address] [place] [address]     Name: PROMISE COLLIER  MRN: [id number]  : 1940  Study Date: 2025 01:46 PM  Age: 84 yrs  Gender: Female  Patient Location: [facility]  Reason For Study: Syncope and Collapse  Ordering Physician: JOHANN SIFUENTES  Performed By: ALESSIA Melendez     BSA: 1.3 m2  Height: 60 in  Weight: 88 lb  HR: 68  BP: 142/57 mmHg  ______________________________________________________________________________  Procedure  Echocardiogram with two-dimensional, color and spectral Doppler. Adequate  quality two-dimensional was performed and interpreted.  ______________________________________________________________________________  Interpretation Summary     The left ventricle is normal in size.  Left ventricular systolic function is normal.  The visual ejection fraction is 60-65%.  Normal left ventricular wall motion  The right ventricle is normal in structure, function and size.  The aortic valve is trileaflet with aortic valve sclerosis.  There is mild (1+) aortic regurgitation.  The inferior vena cava was normal in size with preserved respiratory  variability.  Compared to prior study, there is no significant change.  ______________________________________________________________________________  Left Ventricle  The left ventricle is normal in size. There is normal left ventricular wall  thickness. Left ventricular systolic function is normal. The visual ejection  fraction is 60-65%. Grade I or early diastolic dysfunction. Diastolic Doppler  findings (E/E' ratio and/or other parameters) suggest left ventricular filling  pressures are indeterminate. Normal left ventricular wall motion.     Right Ventricle  The right ventricle is normal in structure, function and size.     Atria  Normal left atrial size. Right atrial size is normal. There is no atrial shunt  seen.     Mitral Valve  The mitral valve is normal in structure and function. There is trace mitral  regurgitation.     Tricuspid  Valve  The tricuspid valve is normal in structure and function. There is trace  tricuspid regurgitation. The right ventricular systolic pressure is  approximated at 24.7 mmHg plus the right atrial pressure. Right ventricle  systolic pressure estimate normal. IVC diameter <2.1 cm collapsing >50% with  sniff suggests a normal RA pressure of 3 mmHg.     Aortic Valve  The aortic valve is trileaflet with aortic valve sclerosis. There is mild (1+)  aortic regurgitation. No aortic stenosis is present.     Pulmonic Valve  The pulmonic valve is not well seen, but is grossly normal. There is trace  pulmonic valvular regurgitation.     Vessels  Normal size aorta. The inferior vena cava was normal in size with preserved  respiratory variability.     Pericardium  There is no pericardial effusion.     ______________________________________________________________________________  MMode/2D Measurements & Calculations  Ao root diam: 2.4 cm  LVOT diam: 2.0 cm  LVOT area: 3.1 cm2  Ao root diam index Ht(cm/m): 1.6     Ao root diam index BSA (cm/m2): 1.8  LA Volume (BP): 35.9 ml  LA Volume Index (BP): 27.2 ml/m2  TAPSE: 2.9 cm     Doppler Measurements & Calculations  MV E max derrell: 91.7 cm/sec  MV A max derrell: 109.7 cm/sec  MV E/A: 0.84  MV dec slope: 351.9 cm/sec2  MV dec time: 0.26 sec  Ao V2 max: 163.6 cm/sec  Ao max P.0 mmHg  AI P1/2t: 389.0 msec     PA acc time: 0.13 sec  TR max derrell: 248.5 cm/sec  TR max P.7 mmHg  E/E' avg: 10.6  Lateral E/e': 10.6  Medial E/e': 10.7  RV S Derrell: 13.5 cm/sec     ______________________________________________________________________________  Report approved by: Shailesh Graves MD on 2025 02:58 PM

## 2025-02-19 NOTE — PROGRESS NOTES
RECEIVING UNIT ED HANDOFF REVIEW    ED Nurse Handoff Report was reviewed by: Eliza Pozo RN on February 18, 2025 at 6:16 PM

## 2025-02-19 NOTE — PROGRESS NOTES
Pt settled in room. Vital signs obtained and stable, except for HTN. Pt AOx3, disoriented to time, [place], wearing glasses. Skin WNL. PIV SL. A1/GB/W. Bed alarm on. Daughter at bedside. Atenolol and eye drops placed in pts bin.

## 2025-02-19 NOTE — PLAN OF CARE
Goal Outcome Evaluation:      Plan of Care Reviewed With: patient, child  PRIMARY Concern: Syncope, fall, dizziness  SAFETY RISK Concerns (fall risk, behaviors, etc.): Fall       Aggression Tool Color: Green   Isolation/Type: None   Tests/Procedures for NEXT shift: AM labs   Consults? (Pending/following, signed-off?) Cardiology signed off; PT following   Where is patient from? (Home, TCU, etc.): Home   Other Important info for NEXT shift: None  Anticipated DC date & active delays: 2/20? PT recommending home with home care vs. TCU  _____________________________________________________________________________  SUMMARY NOTE:  Orientation/Cognitive: AOx4, [place]  Observation Goals (Met/ Not Met): Not met   Mobility Level/Assist Equipment: A1/GB/W   Antibiotics & Plan (IV/po, length of tx left): None  Pain Management: Denies   Complete Pain Reassessment: Y  Due next: Next shift   Tele/VS/O2: VSS on RA, ortho's negative   ABNL Lab/BG: Trop 90, 83; WBC 12.7; EF 60-65  Diet: Regular   Bowel/Bladder: Continent   Skin Concerns: WNL   Drains/Devices: PIV SL   Patient Stated Goal for Today: Rest

## 2025-02-19 NOTE — PROGRESS NOTES
PRIMARY DIAGNOSIS: SYNCOPE  OUTPATIENT/OBSERVATION GOALS TO BE MET BEFORE DISCHARGE:  1. Orthostatic performed: Yes:          Lying Orthostatic BP: 168/54         Sitting Orthostatic BP: 166/56         Standing Orthostatic BP: 174/61     2. Diagnostic testing complete & at baseline neurologic testing: No    3. Cleared by consultants (if involved): No    4. Interpretation of cardiac rhythm per telemetry tech: NSR    5. Tolerating adequate PO diet and medications: Yes    6. Return to near baseline physical activity or neurologic status: No    Discharge Planner Nurse   Safe discharge environment identified: No  Barriers to discharge: Yes       Entered by: Eliza Pozo RN 02/19/2025 11:00 AM     Please review provider order for any additional goals.   Nurse to notify provider when observation goals have been met and patient is ready for discharge.

## 2025-02-19 NOTE — PROGRESS NOTES
02/19/25 0825   Appointment Info   Signing Clinician's Name / Credentials (PT) Tamie Davey, PT, DPT   Quick Adds   Quick Adds Vestibular Eval;Certification   Living Environment   People in Home alone   Current Living Arrangements condominium   Home Accessibility no concerns   Transportation Anticipated family or friend will provide   Self-Care   Usual Activity Tolerance moderate   Regular Exercise No   Equipment Currently Used at Home none   Fall history within last six months yes   Number of times patient has fallen within last six months 1   Activity/Exercise/Self-Care Comment Patient reports independence with mobility and ADLs at baseline.   General Information   Onset of Illness/Injury or Date of Surgery 02/17/25   Referring Physician Elpidio Briggs MD   Patient/Family Therapy Goals Statement (PT) to feel less shaky   Pertinent History of Current Problem (include personal factors and/or comorbidities that impact the POC) Patient is 83 YO F who presented to the hospital after a fall at home, possible syncope episode but patient denies loss of consciousness. PMH Includes hypertension and osteoporosis.   Existing Precautions/Restrictions fall   General Observations Patient sitting on edge of bed finishing breakfast, agreeable to PT.   Cognition   Affect/Mental Status (Cognition) WFL   Orientation Status (Cognition) oriented x 3   Follows Commands (Cognition) follows one-step commands;repetition of directions required   Cognitive Status Comments Patient very hard of hearing, repeated cues and directions needed. Decreased carry-over noted of education.   Pain Assessment   Patient Currently in Pain No   Integumentary/Edema   Integumentary/Edema no deficits were identifed   Posture    Posture Forward head position   Range of Motion (ROM)   Range of Motion ROM is WFL   Strength (Manual Muscle Testing)   Strength (Manual Muscle Testing) Deficits observed during functional mobility   Strength Comments  Generalized weakness noted during transfers   Bed Mobility   Bed Mobility no deficits identified   Comment, (Bed Mobility) Independent supine<> sit from HOB flat with no dizziness during positional changes   Transfers   Comment, (Transfers) Sit to stand with quad cane and CGA, shaky   Gait/Stairs (Locomotion)   [place] Level (Gait) contact guard   Assistive Device (Gait) walker, front-wheeled   Distance in Feet (Gait) 10'   Pattern (Gait) step-through   Comment, (Gait/Stairs) Patient ambulated in room with shakiness throughout but no losses of balance with support of walker.   Balance   Balance Comments CGA to Min A for standing balance, sway observed; SBA for standing balance with FWW   Sensory Examination   Sensory Perception patient reports no sensory changes   Coordination   Coordination no deficits were identified   Muscle Tone   Muscle Tone Comments Mild shakiness, tremors in B UEs which is not baseline per patient   Cervicogenic Screen   Neck ROM WFL and pain free   Oculomotor Exam   Ocular ROM Normal   Smooth Pursuit Normal   VOR Abnormal   VOR Comments Dizziness, difficulty maintaining focus on target even at slow speed   Head Impulse Test Other   Head Impulse Test Comments Difficulty performing due to patient holding head/neck stiff   Convergence Testing Normal   Infrared Goggle Exam or Frenzel Lense Exam   Exam completed with Room Light   Spontaneous Nystagmus Negative   Gaze Evoked Nystagmus Horizontal L   Positional Testing Comments No vertigo with supine <> sit positional changes   Clinical Impression   Criteria for Skilled Therapeutic Intervention Yes, treatment indicated   PT Diagnosis (PT) Impaired mobility   Influenced by the following impairments Weakness, impaired balance, decreased activity tolerance, impaired VOR   Functional limitations due to impairments Increased difficulty with functional transfers and ambulation   Clinical Presentation (PT Evaluation Complexity) stable   Clinical  Presentation Rationale Medical status, clinical judgement   Clinical Decision Making (Complexity) low complexity   Planned Therapy Interventions (PT) balance training;gait training;home exercise program;neuromuscular re-education;patient/family education;transfer training;progressive activity/exercise;strengthening;home program guidelines;other (see comments)  (vestibular therapy)   Risk & Benefits of therapy have been explained evaluation/treatment results reviewed;care plan/treatment goals reviewed;risks/benefits reviewed;current/potential barriers reviewed;participants voiced agreement with care plan;participants included;patient   Clinical Impression Comments Orthostatic vital signs negative - see VS flow sheet   PT Total Evaluation Time   PT Eval, Low Complexity Minutes (44228) 15   Therapy Certification   Start of care date 02/19/25   Certification date from 02/19/25   Certification date to 03/01/25   Medical Diagnosis NSTEMI   Physical Therapy Goals   PT Frequency Daily   PT Predicted Duration/Target Date for Goal Attainment 03/01/25   PT Goals Transfers;Gait;PT Goal 1   PT: Transfers Modified independent;Sit to/from stand;Bed to/from chair;Assistive device  (FWW)   PT: Gait Modified independent;Rolling walker;100 feet   PT: Goal 1 Patient will demonstrate independent with vestibular HEP to transition to self-management of exercises to reduce dizziness.   Interventions   Interventions Quick Adds Neuromuscular Re-ed;Therapeutic Activity   Therapeutic Activity   Therapeutic Activities: dynamic activities to improve functional performance Minutes (84158) 18   Symptoms Noted During/After Treatment Dizziness;Fatigue   Treatment Detail/Skilled Intervention Following evaluation, sit to stand with patient's personal quad cane, CGA for safety and balance. Patient with trunk sway in standing and reports feeling shaky. Switched for use of FWW. Patient ambulated ~ 12' to bathroom with FWW and CGA, c ues for walker safety  "when turning to toilet. Stand to sit with use of grab bar and SBA. Patient able to manage undergarments but assist needed for gown management. SBA for hygiene on toilet, patient stating she feels \"off balance\" when she leans forward. Sit to stand from toilet with SBA. CGA for standing balance when pulling up undergarments. Patient ambulated ~12' back to EOB with FWW and CGA. Stand to sit SBA. Sit to standn with good return demo of hand placements. Patient ambulated ~ 50' with CGA before requesting to turn around and walk 50' back to room with CGA. Seated rest in chair. Following attempts for vestibular exercise, patient requesting to return to bed due to fatigue. Patient transferred from chair to EOB with FWW and SBA. Sit to supine independently. Patient in bed with alarm activated at end of session.   Neuromuscular Re-education   Neuromuscular Re-Education Minutes (96015) 8   Symptoms Noted During/After Treatment dizziness   Treatment Detail/Skilled Intervention Educated patient on purpose of exercises and head movement. Patient with decreased understanding stating she feels like her dizziness is because she didn't take her BP medication last night and then states it was because she was hungry last night. Re-explained findings of vestibular assessment, patient agreeable to exercises. Demo provided for VOR x1 exercise. Patient attempted x2 reps with use of marker in hand as target, slow speed with onset of symptoms and slips. Patient stating, \"That's enough for now.\"   PT Discharge Planning   PT Plan Finish vestibular eval (rule out BPPV), review VOR exercises, progress ambulation   PT Discharge Recommendation (DC Rec) home with assist;home with home care physical therapy;home with outpatient physical therapy;Transitional Care Facility   PT Rationale for DC Rec Patient lives alone and is independent with mobility at baseline. Currently, patient presenting with  impaired VOR, dizziness, and impaired balance requiring " Ax1 and support of walker for mobility. She has two local daughters. If one of her daughters could stay with her then would recommend home with HHPT vs OP PT for vestibular therapy. If family is unable to assist patient at discharge, then TCU would be indicate as patient is at increased falls risk and does not appear safe to return home alone at this time.   PT Brief overview of current status Ax1 with FWW; Goals of therapy will be to address safe mobility and make recs for d/c to next level of care. Pt and RN will continue to follow all falls risk precautions as documented by RN staff while hospitalized.   PT Total Distance Amb During Session (feet) 134   PT Equipment Needed at Discharge walker, rolling  (has at home)   Physical Therapy Time and Intention   Timed Code Treatment Minutes 26   Total Session Time (sum of timed and untimed services) 41       [facility]                                                                                   OUTPATIENT PHYSICAL THERAPY    PLAN OF TREATMENT FOR OUTPATIENT REHABILITATION   Patient's Last Name, First Name, Karen Cochran YOB: 1940   Provider's Name   [facility]   Medical Record No.  [id number]     Onset Date: 02/17/25 Start of Care Date: 02/19/25     Medical Diagnosis:  NSTEMI               PT Diagnosis:  Impaired mobility Certification Dates:  From: 02/19/25  To: 03/01/25       See note for plan of treatment, functional goals, and certification details.    I CERTIFY THE NEED FOR THESE SERVICES FURNISHED UNDER        THIS PLAN OF TREATMENT AND WHILE UNDER MY CARE (Physician co-signature of this document indicates review and certification of the therapy plan).

## 2025-02-19 NOTE — PROGRESS NOTES
Slept well overnight. VSS on room air. Orthostatics negative. Ambulating assist of 1 cane - has denied dizziness, but is unsteady on her feet. A&Ox4. Tolerating regular diet. Echo ordered. PT/OT ordered.          02/18/25 2019   Lying Orthostatic BP   Lying Orthostatic /60   Lying Orthostatic Pulse 63 bpm   Sitting Orthostatic BP   Sitting Orthostatic /64   Sitting Orthostatic Pulse 73 bpm   Standing Orthostatic BP   Standing Orthostatic /68   Standing Orthostatic Pulse 68 bpm   Oxygen Therapy   SpO2 99 %   O2 Device None (Room air)

## 2025-02-19 NOTE — PLAN OF CARE
Occupational Therapy: OT orders received and chart reviewed.?OT not indicated. Spoke with PT - pt with mobility needs at this time, some noted dizziness/impaired balance, defer functional mobility and balance to PT during pt's current IP admit.?No acute OT needs identified. Defer discharge recommendations to PT and Medical Team.? Will complete OT orders.

## 2025-02-20 ENCOUNTER — APPOINTMENT (OUTPATIENT)
Dept: PHYSICAL THERAPY | Facility: CLINIC | Age: 85
End: 2025-02-20
Payer: COMMERCIAL

## 2025-02-20 VITALS
SYSTOLIC BLOOD PRESSURE: 170 MMHG | WEIGHT: 91.27 LBS | RESPIRATION RATE: 16 BRPM | HEIGHT: 60 IN | TEMPERATURE: 97.8 F | HEART RATE: 62 BPM | OXYGEN SATURATION: 99 % | BODY MASS INDEX: 17.92 KG/M2 | DIASTOLIC BLOOD PRESSURE: 63 MMHG

## 2025-02-20 PROBLEM — H83.2X9 VESTIBULAR HYPOFUNCTION: Status: ACTIVE | Noted: 2025-02-20

## 2025-02-20 LAB
ERYTHROCYTE [DISTWIDTH] IN BLOOD BY AUTOMATED COUNT: 12.2 % (ref 10–15)
HCT VFR BLD AUTO: 39.5 % (ref 35–47)
HGB BLD-MCNC: 13.1 G/DL (ref 11.7–15.7)
MCH RBC QN AUTO: 29.1 PG (ref 26.5–33)
MCHC RBC AUTO-ENTMCNC: 33.2 G/DL (ref 31.5–36.5)
MCV RBC AUTO: 88 FL (ref 78–100)
PLATELET # BLD AUTO: 180 10E3/UL (ref 150–450)
RBC # BLD AUTO: 4.5 10E6/UL (ref 3.8–5.2)
WBC # BLD AUTO: 9.8 10E3/UL (ref 4–11)

## 2025-02-20 PROCEDURE — 97112 NEUROMUSCULAR REEDUCATION: CPT | Mod: GP

## 2025-02-20 PROCEDURE — 97530 THERAPEUTIC ACTIVITIES: CPT | Mod: GP

## 2025-02-20 PROCEDURE — 36415 COLL VENOUS BLD VENIPUNCTURE: CPT | Performed by: STUDENT IN AN ORGANIZED HEALTH CARE EDUCATION/TRAINING PROGRAM

## 2025-02-20 PROCEDURE — G0378 HOSPITAL OBSERVATION PER HR: HCPCS

## 2025-02-20 PROCEDURE — 99239 HOSP IP/OBS DSCHRG MGMT >30: CPT | Performed by: INTERNAL MEDICINE

## 2025-02-20 PROCEDURE — 85014 HEMATOCRIT: CPT | Performed by: STUDENT IN AN ORGANIZED HEALTH CARE EDUCATION/TRAINING PROGRAM

## 2025-02-20 PROCEDURE — 250N000013 HC RX MED GY IP 250 OP 250 PS 637: Performed by: INTERNAL MEDICINE

## 2025-02-20 RX ORDER — ATENOLOL 50 MG/1
50 TABLET ORAL 2 TIMES DAILY
Status: DISCONTINUED | OUTPATIENT
Start: 2025-02-20 | End: 2025-02-20 | Stop reason: HOSPADM

## 2025-02-20 RX ORDER — MECLIZINE HCL 12.5 MG 12.5 MG/1
12.5 TABLET ORAL 2 TIMES DAILY PRN
Qty: 20 TABLET | Refills: 0 | Status: SHIPPED | OUTPATIENT
Start: 2025-02-20

## 2025-02-20 RX ADMIN — LOSARTAN POTASSIUM 12.5 MG: 25 TABLET, FILM COATED ORAL at 09:09

## 2025-02-20 ASSESSMENT — ACTIVITIES OF DAILY LIVING (ADL)
ADLS_ACUITY_SCORE: 43
ADLS_ACUITY_SCORE: 48
ADLS_ACUITY_SCORE: 43
ADLS_ACUITY_SCORE: 48
ADLS_ACUITY_SCORE: 48
ADLS_ACUITY_SCORE: 43
ADLS_ACUITY_SCORE: 48
ADLS_ACUITY_SCORE: 43

## 2025-02-20 NOTE — PROGRESS NOTES
PRIMARY DIAGNOSIS: SYNCOPE  OUTPATIENT/OBSERVATION GOALS TO BE MET BEFORE DISCHARGE:  1. Orthostatic performed: Yes:          Lying Orthostatic BP: 168/54         Sitting Orthostatic BP: 166/56         Standing Orthostatic BP: 174/61     2. Diagnostic testing complete & at baseline neurologic testing: Yes    3. Cleared by consultants (if involved): No    4. Interpretation of cardiac rhythm per telemetry tech: NSR    5. Tolerating adequate PO diet and medications: Yes    6. Return to near baseline physical activity or neurologic status: No    Discharge Planner Nurse   Safe discharge environment identified: No  Barriers to discharge: Yes       Entered by: Eliza Pozo RN 02/19/2025 6:00 PM     Please review provider order for any additional goals.   Nurse to notify provider when observation goals have been met and patient is ready for discharge.

## 2025-02-20 NOTE — PROGRESS NOTES
PRIMARY DIAGNOSIS: SYNCOPE  OUTPATIENT/OBSERVATION GOALS TO BE MET BEFORE DISCHARGE:  1. Orthostatic performed: No    2. Diagnostic testing complete & at baseline neurologic testing: Yes    3. Cleared by consultants (if involved): No    4. Interpretation of cardiac rhythm per telemetry tech: SR    5. Tolerating adequate PO diet and medications: Yes    6. Return to near baseline physical activity or neurologic status: No    Discharge Planner Nurse   Safe discharge environment identified: No  Barriers to discharge: Yes       Entered by: Christen Cerrato 02/20/2025 6:00 PM     Please review provider order for any additional goals.   Nurse to notify provider when observation goals have been met and patient is ready for discharge.

## 2025-02-20 NOTE — DISCHARGE SUMMARY
[facility]  Hospitalist Discharge Summary      Date of Admission:  2/17/2025  Date of Discharge:  2/20/2025  Discharging Provider: Beulah Monzon MD  Discharge Service: Hospitalist Service    Discharge Diagnoses     Principal Problem:    Vestibular hypofunction  Active Problems:    Hypertension goal BP (blood pressure) < 140/90    NSTEMI (non-ST elevated myocardial infarction) (H)          Clinically Significant Risk Factors     # Cachexia: Estimated body mass index is 17.83 kg/m  as calculated from the following:    Height as of this encounter: 1.524 m (5').    Weight as of this encounter: 41.4 kg (91 lb 4.3 oz).       Follow-ups Needed After Discharge   Follow-up Appointments       Hospital Follow-up with Existing Primary Care Provider (PCP)      Please see details below         Schedule Primary Care visit within: 7 Days   Recommended labs and Imaging (to be ordered by Primary Care Provider): bmp, recheck BP, ensure she is taking the losartan (had never started yet).                Unresulted Labs Ordered in the Past 30 Days of this Admission       No orders found from 1/18/2025 to 2/18/2025.             Discharge Disposition   Discharged to home with [facility].   Daughter to stay with her for a couple nights.   Condition at discharge: Stable    Hospital Course     84-year-old female with history of hypertension, SVT/PVC on atenolol presented to the ED with complaint of fall.     Syncopal episode most likely vasovagal vs vestibular hypofunction  Rule out rule  orthostatic hypotension - ruled out    This is a 84-year-old female with history of hypertension, history of SVT and PVC in the past on atenolol chronically at nighttime, presented with history of fall and passing out event. She felt not right, unable to explain what she means, she was hanging onto things in the kitchen and then found herself on the floor and bump her head on the back.   She was not confused, she was able to get up  and sit on a chair and called her daughter to bring her to the hospital. She had extensive workup done in the ED including CT scan of the chest PE protocol which was negative for any pulm embolism, aortic dissection. No infiltrate, pleural effusion or pneumothorax. 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  - CT head negative for any acute process including hemorrhage, stroke or mass lesion  - CT cervical spine negative for any acute fracture  - CT lumbar spine negative for any acute fracture  - TTE showed EF 60-65%, no change from previous     - Continue PT/OT==> patient started Gaze stability exercises for possible vestibular hypofunction.  She will continue this with home phys therapy      - Started meclizine prn 2/19, may have helped, will continue prn outpatient    - outpatient shanellopafelix, cardiology follow up (see below)          NSTEMI type II  Patient no chest pain no palpitation, EKG unchanged from previously.  Mildly elevated troponin which remained flat recheck troponin this morning remain flat as well.  Likely mildly elevated troponin due to demand ischemia because of fall and stress     Cardiology is consulted, == > they will see her in follow up.   7 day ziopatch recommended due to the presentation of fall/dizziness.   Ordered.       HTN:  PTA patient was taking atenolol 25 mg in AM and 50 mg in evening. Had not started taking losartan from med list. Previous provider made the following adjustments.  - Atenolol 50 mg at bedtime  - losartan 12.5 mg daily, patient had not been taking prior to admission.  Due to elevated BPs this stay, she is coached to start   - orthostatic vitals negative     Leucocytosis: resolved  -no infectious etiology noted,no symptoms,imaging negative  - WBC trend 16.5 - 12.7 -->9.8    Consultations This Hospital Stay   PHARMACY IP CONSULT  PHARMACY IP CONSULT  CARDIOLOGY IP CONSULT  PHYSICAL THERAPY ADULT IP CONSULT  OCCUPATIONAL  THERAPY ADULT IP CONSULT  CARE MANAGEMENT / SOCIAL WORK IP CONSULT    Code Status   Full Code    Time Spent on this Encounter   I, Beulah Monzon MD, personally saw the patient today and spent greater than 30 minutes discharging this patient.       Beulah Monzon MD  [facility] EXTENDED RECOVERY AND SHORT STAY  [address] [facility] [address]  Phone: [phone]  ______________________________________________________________________    Physical Exam   Vital Signs: Temp: 97.8  F (36.6  C) Temp src: Oral BP: (!) 170/63 Pulse: 62   Resp: 16 SpO2: 99 % O2 Device: None (Room air)    Weight: 91 lbs 4.33 oz     Constitutional:  elderly, frail... awake, alert, cooperative, no apparent distress, and appears stated age  Eyes: Lids and lashes normal, pupils equal round and reactive to light and accomodation   ENT: Normocephalic, without obvious abnormality, atraumatic  Respiratory: No increased work of breathing, good air exchange, clear to auscultation bilaterally, no crackles or wheezing  Cardiovascular: Normal apical impulse, regular rate and rhythm, normal S1 and S2, no S3 or S4, and no murmur noted  GI: No scars, normal bowel sounds, soft, non-distended, non-tender, no masses palpated, no hepatosplenomegally  Skin: normal skin color, texture, turgor  Musculoskeletal: dejenerative joint arthritis changes  Neurologic: Awake, alert, oriented to name, place and time.  Cranial nerves II-XII are grossly intact.  Motor is 5 out of 5 bilaterally.  Symmetric upper and lower   Neuropsychiatric: General: normal, calm, and normal eye contact          Primary Care Physician   Saurabh Garza    Discharge Orders      Home Care Referral      Primary Care - Care Coordination Referral      Activity    Your activity upon discharge: activity as tolerated     Reason for your hospital stay    Dizziness  Possible vestibular hypofunction  Fall - no injury  Hypertension     Diet    Follow this  diet upon discharge: Current Diet:Orders Placed This Encounter      Regular Diet Adult     Hospital Follow-up with Existing Primary Care Provider (PCP)    Please see details below          ZIO PATCH MAIL OUT       Significant Results and Procedures   Most Recent 3 CBC's:  Recent Labs   Lab Test 02/20/25  0750 02/18/25  0637 02/17/25  1311   WBC 9.8 12.7* 16.5*   HGB 13.1 12.0 13.5   MCV 88 87 88    181 222     Most Recent 3 BMP's:  Recent Labs   Lab Test 02/18/25  0637 02/17/25  1311 08/21/24  1144    140 141   POTASSIUM 4.1 4.2 4.1   CHLORIDE 106 102 107   CO2 26 29 24   BUN 18.6 17.9 22.8   CR 0.84 0.81 0.80   ANIONGAP 8 9 10   BATSHEVA 8.7* 9.8 9.2   * 177* 115*     Most Recent 2 LFT's:  Recent Labs   Lab Test 02/18/25  0637 02/17/25  1311   AST 28 30   ALT 21 25   ALKPHOS 63 76   BILITOTAL 0.6 0.3     7-Day Micro Results       No results found for the last 168 hours.          Most Recent TSH and T4:  Recent Labs   Lab Test 08/21/24  1144   TSH 1.02   ,   Results for orders placed or performed during the hospital encounter of 02/17/25   Head CT w/o contrast    Narrative    EXAM: CT HEAD W/O CONTRAST  LOCATION: [facility]  DATE: 2/17/2025    INDICATION: fall, head trauma  COMPARISON: None.  TECHNIQUE: Routine CT Head without IV contrast. Multiplanar reformats. Dose reduction techniques were used.    FINDINGS:  INTRACRANIAL CONTENTS: No intracranial hemorrhage, extraaxial collection, or mass effect.  No CT evidence of acute infarct. Mild presumed chronic small vessel ischemic changes. Normal ventricles and sulci.     Left frontoparietal white matter patchy hypoattenuation nonspecific likely due to chronic infarcts.    VISUALIZED ORBITS/SINUSES/MASTOIDS: No intraorbital abnormality. No paranasal sinus mucosal disease. No middle ear or mastoid effusion.    BONES/SOFT TISSUES: No acute abnormality.      Impression    IMPRESSION:  1.  No acute intracranial process.    2.   Chronic intracranial changes described above.   CT Cervical Spine w/o Contrast    Narrative    EXAM: CT CERVICAL SPINE W/O CONTRAST  LOCATION: [facility]  DATE: 2/17/2025    INDICATION: fall, head trauma  COMPARISON: None.  TECHNIQUE: Routine CT Cervical Spine without IV contrast. Multiplanar reformats. Dose reduction techniques were used.    FINDINGS:  VERTEBRA:   T3 vertebral body superior endplate partially visualized mild to moderate compression deformity age-indeterminate. This is better visualized on the CT chest performed concomitantly.    No acute fracture within the cervical spine.  No acute post traumatic subluxations.   Remaining vertebral body heights within normal limits. No prevertebral soft tissue swelling. Diffuse bony demineralization.    CANAL/FORAMINA: Multilevel spondylosis.  No significant central canal stenosis.  Various levels and degrees of neural foraminal stenosis.  No significant subluxations.    PARASPINAL: No significant extraspinal abnormality.      Impression     IMPRESSION:  1.  T3 vertebral body superior endplate partially visualized mild to moderate compression deformity age-indeterminate. This is better visualized on the CT chest performed concomitantly.    2.  No acute fracture within the cervical spine.      3.  Chronic spine changes described above.   CT Lumbar Spine w/o Contrast    Narrative    EXAM: CT LUMBAR SPINE W/O CONTRAST  LOCATION: [facility]  DATE: 2/17/2025    INDICATION: low back pain s p fall  COMPARISON: None.  TECHNIQUE: Routine CT Lumbar Spine without IV contrast. Multiplanar reformats. Dose reduction techniques were used.     FINDINGS:  S1-S2 disc space.    VERTEBRA: No acute fracture.  No acute post traumatic subluxations.   Normal vertebral body heights. Diffuse bony demineralization.    CANAL/FORAMINA: Lumbar levoscoliosis. Multilevel spondylosis. Various levels and degrees of central canal stenosis.  No  critical central canal stenosis.  Various levels and degrees of neural foraminal stenosis. L1-L2 mild degenerative grade 1   retrolisthesis measuring 1 mm. L5-S1 grade 1 anterolisthesis measuring 3 mm. Bilateral SI degenerative joint disease.    PARASPINAL: No significant extraspinal abnormality.      Impression     IMPRESSION:  1.  No acute fracture.    2.  Chronic spine changes described above.   CT Chest Pulmonary Embolism w Contrast    Narrative    EXAM: CT CHEST PULMONARY EMBOLISM W CONTRAST  LOCATION: [facility]  DATE: 02/17/2025    INDICATION: Fall. Elevated D-dimer.  COMPARISON: None.  TECHNIQUE: CT chest pulmonary angiogram during arterial phase injection of IV contrast. Multiplanar reformats and MIP reconstructions were performed. Dose reduction techniques were used.   CONTRAST: 45 mL Isovue 370.    FINDINGS:  ANGIOGRAM CHEST: Pulmonary arteries are normal caliber and negative for pulmonary emboli. Thoracic aorta is negative for dissection. No CT evidence of right heart strain.    LUNGS AND PLEURA: Mild bronchial wall thickening and mosaic attenuation pattern throughout both lungs could indicate mild nonspecific bronchial inflammation and multifocal air trapping. Lungs otherwise clear. No pleural effusion. No pneumothorax.    MEDIASTINUM/AXILLAE: Heart size normal. No pericardial effusion. No lymphadenopathy. Benign calcified left hilar lymph nodes.    CORONARY ARTERY CALCIFICATION: Mild calcified atheromatous plaque LAD and circumflex coronary arteries.    UPPER ABDOMEN: Unremarkable.    MUSCULOSKELETAL: No acute fracture. Bones are demineralized. Healed fracture proximal right humerus status post ORIF with plate and screw. Mild chronic vertebral body compression T3 and T6.      Impression    IMPRESSION:  1.  No pulmonary emboli. No acute aortic syndrome.  2.  CT signs of potential mild nonspecific bronchial inflammation and multifocal air trapping.  3.  Mild calcified  atheromatous plaque LAD and circumflex coronary arteries.  4.  Bones are demineralized with mild chronic vertebral body compression T3 and T6.   US Carotid Bilateral    Narrative    EXAM: US CAROTID BILATERAL  LOCATION: [facility]  DATE: 2025    INDICATION: syncope  COMPARISON: None.  TECHNIQUE: Duplex exam performed utilizing 2D gray-scale imaging, Doppler interrogation with color-flow and spectral waveform analysis. The percent diameter stenosis is determined using Updated Recommendations for Carotid Stenosis Interpretation Criteria   from [employer].    FINDINGS:    RIGHT: Mild plaque at the bifurcation. The peak systolic velocity in the ICA is less than 180 cm/sec, consistent with less than 50% stenosis. Normal velocities in the ECA. Antegrade flow within the vertebral artery.     LEFT: Mild plaque at the bifurcation. The peak systolic velocity in the ICA is less than 180 cm/sec, consistent with less than 50% stenosis. Normal velocities in the ECA. Antegrade flow within the vertebral artery.    VELOCITY CHART:  CCA   Right: 74/14 cm/s   Left: 94/16 cm/s  ICA   Right: 104/24 cm/s   Left: 96/17 cm/s  ECA   Right: 91 cm/s   Left: 159 cm/s  ICA/CCA PSV Ratio   Right: 1.4   Left: 1.0      Impression    IMPRESSION:  1.  Mild plaque formation, velocities consistent with less than 50% stenosis in the right internal carotid artery.  2.  Mild plaque formation, velocities consistent with less than 50% stenosis in the left internal carotid artery.  3.  Flow within the vertebral arteries is antegrade.     Echocardiogram Complete     Value    LVEF  60-65%    Narrative    [id number]  [id number]  [id number]  [id number]^MARIA^JOHANN^LAYLA     [facility]  Echocardiography Laboratory  [address] [place] [address]     Name: PROMISE COLLIER  MRN: [id number]  : 1940  Study Date: 2025 01:46 PM  Age: 84 yrs  Gender: Female  Patient Location: [facility]  Reason For  Study: Syncope and Collapse  Ordering Physician: JOHANN SIFUENTES  Performed By: ALESSIA Melendez     BSA: 1.3 m2  Height: 60 in  Weight: 88 lb  HR: 68  BP: 142/57 mmHg  ______________________________________________________________________________  Procedure  Echocardiogram with two-dimensional, color and spectral Doppler. Adequate  quality two-dimensional was performed and interpreted.  ______________________________________________________________________________  Interpretation Summary     The left ventricle is normal in size.  Left ventricular systolic function is normal.  The visual ejection fraction is 60-65%.  Normal left ventricular wall motion  The right ventricle is normal in structure, function and size.  The aortic valve is trileaflet with aortic valve sclerosis.  There is mild (1+) aortic regurgitation.  The inferior vena cava was normal in size with preserved respiratory  variability.  Compared to prior study, there is no significant change.  ______________________________________________________________________________  Left Ventricle  The left ventricle is normal in size. There is normal left ventricular wall  thickness. Left ventricular systolic function is normal. The visual ejection  fraction is 60-65%. Grade I or early diastolic dysfunction. Diastolic Doppler  findings (E/E' ratio and/or other parameters) suggest left ventricular filling  pressures are indeterminate. Normal left ventricular wall motion.     Right Ventricle  The right ventricle is normal in structure, function and size.     Atria  Normal left atrial size. Right atrial size is normal. There is no atrial shunt  seen.     Mitral Valve  The mitral valve is normal in structure and function. There is trace mitral  regurgitation.     Tricuspid Valve  The tricuspid valve is normal in structure and function. There is trace  tricuspid regurgitation. The right ventricular systolic pressure is  approximated at 24.7 mmHg plus the right  atrial pressure. Right ventricle  systolic pressure estimate normal. IVC diameter <2.1 cm collapsing >50% with  sniff suggests a normal RA pressure of 3 mmHg.     Aortic Valve  The aortic valve is trileaflet with aortic valve sclerosis. There is mild (1+)  aortic regurgitation. No aortic stenosis is present.     Pulmonic Valve  The pulmonic valve is not well seen, but is grossly normal. There is trace  pulmonic valvular regurgitation.     Vessels  Normal size aorta. The inferior vena cava was normal in size with preserved  respiratory variability.     Pericardium  There is no pericardial effusion.     ______________________________________________________________________________  MMode/2D Measurements & Calculations  Ao root diam: 2.4 cm  LVOT diam: 2.0 cm  LVOT area: 3.1 cm2  Ao root diam index Ht(cm/m): 1.6     Ao root diam index BSA (cm/m2): 1.8  LA Volume (BP): 35.9 ml  LA Volume Index (BP): 27.2 ml/m2  TAPSE: 2.9 cm     Doppler Measurements & Calculations  MV E max derrell: 91.7 cm/sec  MV A max derrell: 109.7 cm/sec  MV E/A: 0.84  MV dec slope: 351.9 cm/sec2  MV dec time: 0.26 sec  Ao V2 max: 163.6 cm/sec  Ao max P.0 mmHg  AI P1/2t: 389.0 msec     PA acc time: 0.13 sec  TR max derrell: 248.5 cm/sec  TR max P.7 mmHg  E/E' avg: 10.6  Lateral E/e': 10.6  Medial E/e': 10.7  RV S Derrell: 13.5 cm/sec     ______________________________________________________________________________  Report approved by: Shailesh Graves MD on 2025 02:58 PM             Discharge Medications   Current Discharge Medication List        START taking these medications    Details   meclizine (ANTIVERT) 12.5 MG tablet Take 1 tablet (12.5 mg) by mouth 2 times daily as needed for dizziness.  Qty: 20 tablet, Refills: 0    Associated Diagnoses: Dizziness           CONTINUE these medications which have NOT CHANGED    Details   atenolol (TENORMIN) 50 MG tablet Take 1 tablet (50 mg) by mouth 2 times daily.  Qty: 180 tablet, Refills: 3    Associated  Diagnoses: Essential hypertension      Calcium Carbonate-Vitamin D (CALCIUM + D PO) Take by mouth twice a week      Cholecalciferol (VITAMIN D PO) Take by mouth twice a week      NONFORMULARY Place 1 drop into both eyes at bedtime. Over the counter eye drop for dry eyes prescribed after cataract surgery      losartan (COZAAR) 25 MG tablet Take 0.5 tablets (12.5 mg) by mouth daily.  Qty: 45 tablet, Refills: 1    Associated Diagnoses: Essential hypertension           Allergies   Allergies   Allergen Reactions    Sulfa Antibiotics Rash    Hctz      Cough, dehydration, CROSS-REACTION with sulfa allergy      Amlodipine      Patient states that she has allergy to sulfa and amlodipine does have sulfa component and it on the formula and does not want to take it.    Lisinopril      Pt states she developed memory loss after starting Lisinopril and do not want to take it.     Darvon [Propoxyphene Hcl] Other (See Comments)     Stiff neck

## 2025-02-20 NOTE — PLAN OF CARE
Physical Therapy Discharge Summary    Reason for therapy discharge:    Discharged to home with home therapy.    Progress towards therapy goal(s). See goals on Care Plan in [facility] electronic health record for goal details.  Goals partially met.  Barriers to achieving goals:   discharge from facility.    Therapy recommendation(s):    Continued therapy is recommended.  Rationale/Recommendations:  Patient lives alone and is independent with mobility at baseline. Currently, patient presenting with signs and symptoms consistent with unilateral vestibular hypofunction. She is mobilizing SBA with a FWW (she has a walker at home). She would benefit from increased support from daughters to ensure safe discharge home as patient was not using walker prior to admission. She would benefit most from outpatient vestibular PT, but if she will not have a ride then HHPT..

## 2025-02-20 NOTE — PROGRESS NOTES
PRIMARY DIAGNOSIS: SYNCOPE  OUTPATIENT/OBSERVATION GOALS TO BE MET BEFORE DISCHARGE:  1. Orthostatic performed: No    2. Diagnostic testing complete & at baseline neurologic testing: Yes    3. Cleared by consultants (if involved): Yes    4. Interpretation of cardiac rhythm per telemetry tech: SR    5. Tolerating adequate PO diet and medications: Yes    6. Return to near baseline physical activity or neurologic status: Yes    Discharge Planner Nurse   Safe discharge environment identified: No  Barriers to discharge: Yes       Entered by: Christen Cerrato 02/20/2025 6:00 PM     Please review provider order for any additional goals.   Nurse to notify provider when observation goals have been met and patient is ready for discharge.

## 2025-02-20 NOTE — DISCHARGE INSTRUCTIONS
Start the losartan medication for your high blood pressure  Check blood pressure daily on a home BP cuff and write down, show your doctor

## 2025-02-20 NOTE — PLAN OF CARE
Goal Outcome Evaluation:      Plan of Care Reviewed With: patient      SUMMARY NOTE:  Orientation/Cognitive: AOx4, [place]  Observation Goals (Met/ Not Met): Not met   Mobility Level/Assist Equipment: A1/GB/W   Antibiotics & Plan (IV/po, length of tx left): None  Pain Management: Denies   Complete Pain Reassessment: Y  Due next: Next shift   Tele/VS/O2: VSS on RA, ortho's negative   ABNL Lab/BG: Trop 90, 83; WBC 12.7; EF 60-65  Diet: Regular   Bowel/Bladder: Continent   Skin Concerns: WNL   Drains/Devices: PIV SL   Patient Stated Goal for Today: Rest     Goal Outcome Evaluation:       Plan of Care Reviewed With: patient  PRIMARY Concern: Syncope, fall, dizziness  SAFETY RISK Concerns (fall risk, behaviors, etc.): Fall       Aggression Tool Color: Green   Isolation/Type: None   Tests/Procedures for NEXT shift: AM labs, ECHO completed today   Consults? (Pending/following, signed-off?) Cardiology signed off; PT following   Where is patient from? (Home, TCU, etc.): Home   Other Important info for NEXT shift: None  Anticipated DC date & active delays: 2/20? PT recommending home with home care vs. TCU

## 2025-02-20 NOTE — PLAN OF CARE
Goal Outcome Evaluation:         PRIMARY Concern: Syncope, fall, dizziness  SAFETY RISK Concerns (fall risk, behaviors, etc.): Fall       Aggression Tool Color: Green   Isolation/Type: None   Tests/Procedures for NEXT shift: AM labs   Consults? (Pending/following, signed-off?) Cardiology signed off; PT following   Where is patient from? (Home, TCU, etc.): Home   Other Important info for NEXT shift: None  Anticipated DC date & active delays: 2/20? PT recommending home with home care vs. TCU  _____________________________________________________________________________  SUMMARY NOTE:  Orientation/Cognitive: AOx4, [place]  Observation Goals (Met/ Not Met): Not met   Mobility Level/Assist Equipment: A1/GB/W   Antibiotics & Plan (IV/po, length of tx left): None  Pain Management: Denies   Complete Pain Reassessment: Y  Due next: Next shift   Tele/VS/O2: VSS on RA, ortho's negative   ABNL Lab/BG: Trop 90, 83; WBC 12.7; EF 60-65; Tele SB/SR  Diet: Regular   Bowel/Bladder: Continent   Skin Concerns: WNL   Drains/Devices: PIV SL'd   Patient Stated Goal for Today: Rest

## 2025-02-20 NOTE — PLAN OF CARE
Goal Outcome Evaluation:      Plan of Care Reviewed With: patient, child    Discharge instructions discussed and questions answered. Discharge medications sent with patient. Discharging home and daughter providing ride.

## 2025-02-20 NOTE — PROGRESS NOTES
PRIMARY DIAGNOSIS: SYNCOPE  OUTPATIENT/OBSERVATION GOALS TO BE MET BEFORE DISCHARGE:  1. Orthostatic performed: Yes:          Lying Orthostatic BP: 168/54         Sitting Orthostatic BP: 166/56         Standing Orthostatic BP: 174/61     2. Diagnostic testing complete & at baseline neurologic testing: Yes    3. Cleared by consultants (if involved): No    4. Interpretation of cardiac rhythm per telemetry tech: NSR    5. Tolerating adequate PO diet and medications: Yes    6. Return to near baseline physical activity or neurologic status: No    Discharge Planner Nurse   Safe discharge environment identified: No  Barriers to discharge: Yes       Entered by: Eliza Pozo RN 02/19/2025 3:00 PM     Please review provider order for any additional goals.   Nurse to notify provider when observation goals have been met and patient is ready for discharge.

## 2025-02-20 NOTE — PROGRESS NOTES
PRIMARY DIAGNOSIS: SYNCOPE  OUTPATIENT/OBSERVATION GOALS TO BE MET BEFORE DISCHARGE:  1. Orthostatic performed: No    2. Diagnostic testing complete & at baseline neurologic testing: Yes    3. Cleared by consultants (if involved): No    4. Interpretation of cardiac rhythm per telemetry tech: SB/SR    5. Tolerating adequate PO diet and medications: Yes    6. Return to near baseline physical activity or neurologic status: No    Discharge Planner Nurse   Safe discharge environment identified: No  Barriers to discharge: Yes       Entered by: Jackie Burdick RN 02/20/2025 6:00 PM     Please review provider order for any additional goals.   Nurse to notify provider when observation goals have been met and patient is ready for discharge.

## 2025-02-21 ENCOUNTER — ORDERS ONLY (AUTO-RELEASED) (OUTPATIENT)
Dept: MEDSURG UNIT | Facility: CLINIC | Age: 85
End: 2025-02-21
Payer: COMMERCIAL

## 2025-02-21 DIAGNOSIS — I21.4 NSTEMI (NON-ST ELEVATED MYOCARDIAL INFARCTION) (H): ICD-10-CM

## 2025-02-22 ENCOUNTER — MEDICAL CORRESPONDENCE (OUTPATIENT)
Dept: HEALTH INFORMATION MANAGEMENT | Facility: CLINIC | Age: 85
End: 2025-02-22

## 2025-02-24 ENCOUNTER — TELEPHONE (OUTPATIENT)
Dept: CARDIOLOGY | Facility: CLINIC | Age: 85
End: 2025-02-24
Payer: COMMERCIAL

## 2025-02-24 NOTE — TELEPHONE ENCOUNTER
Patient was admitted to [facility] on 2/17/25 after fall and passing out event.     PMH: hypertension, SVT/PVC on Atenolol.    2/19/25: Echo showed EF of 60-65%, no change from previous.    Pt was started on Meclizine at time of discharge. 7 day Zio Patch monitor to be mailed out.    Pt is scheduled for an OV on 3/20/25 at 1110 with TED Irina Chandler at our [place] Office.    Writer attempted to call pt for a cardiology post discharge phone call, but no answer. VM left to call back with any non emergent cardiac related questions. Reminder left of appt as scheduled above. Writer's phone number was provided. MARGARITA Cash RN.

## 2025-02-27 ENCOUNTER — OFFICE VISIT (OUTPATIENT)
Dept: FAMILY MEDICINE | Facility: CLINIC | Age: 85
End: 2025-02-27
Payer: COMMERCIAL

## 2025-02-27 VITALS
OXYGEN SATURATION: 96 % | HEIGHT: 66 IN | HEART RATE: 71 BPM | DIASTOLIC BLOOD PRESSURE: 65 MMHG | RESPIRATION RATE: 16 BRPM | BODY MASS INDEX: 14.45 KG/M2 | SYSTOLIC BLOOD PRESSURE: 160 MMHG | WEIGHT: 89.9 LBS | TEMPERATURE: 97.6 F

## 2025-02-27 DIAGNOSIS — H81.10 BENIGN PAROXYSMAL POSITIONAL VERTIGO, UNSPECIFIED LATERALITY: ICD-10-CM

## 2025-02-27 DIAGNOSIS — I47.10 SVT (SUPRAVENTRICULAR TACHYCARDIA): ICD-10-CM

## 2025-02-27 DIAGNOSIS — R42 DIZZINESS: Primary | ICD-10-CM
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
## 2025-02-27 DIAGNOSIS — I49.3 PVC (PREMATURE VENTRICULAR CONTRACTION): ICD-10-CM

## 2025-02-27 DIAGNOSIS — I10 HYPERTENSION GOAL BP (BLOOD PRESSURE) < 140/90: ICD-10-CM

## 2025-02-27 ASSESSMENT — PAIN SCALES - GENERAL: PAINLEVEL_OUTOF10: NO PAIN (0)

## 2025-02-27 NOTE — PROGRESS NOTES
Assessment and Plan    1. Dizziness (Primary)  2. Ground-level fall  3. Hypertension goal BP (blood pressure) < 140/90 (Primary)  4. SVT (supraventricular tachycardia)  5. PVC (premature ventricular contraction)    Recent hospitalization and discharge from 2/17/2025 to 2/20/2025 for fall injury and passing out in her kitchen and found herself on floor with a bump on her head back.    All the workup was done including CT head, cervical spine, lumbar spine which is normal, echocardiogram showed EF 65% LVEF.  Patient is discharged home with meclizine as needed for dizzy spells, she is already on atenolol 50 mg twice daily and losartan half a tablet which is 12.5 mg for blood pressure.  Today blood pressure in the office is still remaining at 176/75. ( Does have risk factors of , history of SVT and PVC )     - Pt states she doesn't remember taking Losartan 1/2 tablet last whole week. Please see AVS below for details on plan.    -Patient daughter requesting for work letter for her sister Kenisha Lopez # works as manfacturing plant and was with patient on 2/17/25 - 2/18/25 and later Sallie was with pt as she states..  Have done the requested work letter.      4. Benign paroxysmal positional vertigo, unspecified laterality  New problem, pt was told to follow up with ENT as per the hospitalist. She states that Meclizine is not making her feel good and not taking it. Will place referral to ENT as requested.   - Adult ENT  Referral; Future       Sallie # Daughter was in the room today, answered all the questions.         MED REC REQUIRED  Post Medication Reconciliation Status:  Discharge medications reconciled and changed, see notes/orders      The longitudinal plan of care for the diagnosis(es)/condition(s) as documented were addressed during this visit. Due to the added complexity in care, I will continue to support Karen in the subsequent management and with ongoing continuity of care.    Please note that this  note consists of symbols derived from keyboarding, dictation and/or voice recognition software. As a result, there may be errors in the script that have gone undetected. Please consider this when interpreting information found in this chart.    Patient Instructions   As discussed , please be aware that the BP is remaining high inspite of current Atenolol and Losartan 1/2 tablet ( which you dont remember whether you took whole last week ) >>   Request you to please send me BP log for next 10 days taking all the medications as prescribed without skipping the dosages     Placed referral to ENT as requested for possible ear issues causing dizziness.       Return in about 25 weeks (around 8/21/2025), or if symptoms worsen or fail to improve, for If symptoms persist, Follow up of last visit, Annual Wellness Exam.    Loni Lamar MD  [facility] is a 84 year [facility], presenting for the following health issues:  Hospital F/U (Fall and dizziness. )        2/27/2025     1:23 PM   Additional Questions   Roomed by Mabel RODRIGUEZ     HPI          2/21/2025   Post Discharge Outreach   How are you doing now that you are home? Doing ok.   How are your symptoms? (Red Flag symptoms escalate to triage hotline per guidelines) Improved   Does the patient have their discharge instructions?  Yes   Does the patient have questions regarding their discharge instructions?  No   Were you started on any new medications or were there changes to any of your previous medications?  Yes   Does the patient have all of their medications? Yes   Do you have questions regarding any of your medications?  No   Do you have all of your needed medical supplies or equipment (DME)?  (i.e. oxygen tank, CPAP, cane, etc.) Yes   Discharge Follow Up Appointment Date 2/27/2025   Discharge Follow Up Appointment Scheduled with? Primary Care Provider       Hospital Follow-up Visit:    Hospital/Nursing Home/IP Rehab  Facility: [facility]  Date of Admission: 02/17/2025  Date of Discharge: 02/20/2025  Reason(s) for Admission: Fall  Was the patient in the ICU or did the patient experience delirium during hospitalization?  No  Do you have any other stressors you would like to discuss with your provider? No    Problems taking medications regularly:  None  Medication changes since discharge: None  Problems adhering to non-medication therapy:  None    Summary of hospitalization:  [facility] discharge summary reviewed  Diagnostic Tests/Treatments reviewed.  Follow up needed: PCP , Cardiology   Other Healthcare Providers Involved in Patient s Care:         Homecare  Update since discharge: stable.         Plan of care communicated with patient and family             Last seen patient in August 2024 for annual physical at that time, she is here for hospital follow-up..       Allergies   Allergen Reactions    Amlodipine      Patient states that she has allergy to sulfa and amlodipine does have sulfa component and it on the formula and does not want to take it.    Lisinopril      Pt states she developed memory loss after starting Lisinopril and do not want to take it.     Sulfa Antibiotics Rash    Hctz      Cough, dehydration, CROSS-REACTION with sulfa allergy      Darvon [Propoxyphene Hcl] Other (See Comments)     Stiff neck          Past Medical History:   Diagnosis Date    Arm fracture, right 12/11    Essential hypertension, benign     Hypertension     PAC (premature atrial contraction)     Pneumonia 1988    PVC (premature ventricular contraction)     Recurrent Otitis media        Past Surgical History:   Procedure Laterality Date    OPEN REDUCTION INTERNAL FIXATION HUMERUS PROXIMAL  12/27/2011    Procedure:OPEN REDUCTION INTERNAL FIXATION HUMERUS PROXIMAL; OPEN REDUCTION INTERNAL FIXATION RIGHT PROXIMAL HUMERUS ; Surgeon:OCTAVIO WRIGHT; Location:[facility] OR    TONSILLECTOMY  as child       Family  "History   Problem Relation Age of Onset    Cerebrovascular Disease Mother         age 83    Cerebrovascular Disease Father         age  82       Social History     Tobacco Use    Smoking status: Never    Smokeless tobacco: Never   Substance Use Topics    Alcohol use: No        Current Outpatient Medications   Medication Sig Dispense Refill    atenolol (TENORMIN) 50 MG tablet Take 1 tablet (50 mg) by mouth 2 times daily. 180 tablet 3    Calcium Carbonate-Vitamin D (CALCIUM + D PO) Take by mouth twice a week      Cholecalciferol (VITAMIN D PO) Take by mouth twice a week      losartan (COZAAR) 25 MG tablet TAKE 1/2 TABLET(12.5 MG) BY MOUTH DAILY 45 tablet 1    NONFORMULARY Place 1 drop into both eyes at bedtime. Over the counter eye drop for dry eyes prescribed after cataract surgery      meclizine (ANTIVERT) 12.5 MG tablet Take 1 tablet (12.5 mg) by mouth 2 times daily as needed for dizziness. (Patient not taking: Reported on 2/27/2025) 20 tablet 0     No current facility-administered medications for this visit.            Review of Systems  Constitutional, HEENT, cardiovascular, pulmonary, GI, , musculoskeletal, neuro, skin, endocrine and psych systems are negative, except as otherwise noted.      Objective    BP (!) 160/65   Pulse 71   Temp 97.6  F (36.4  C) (Temporal)   Resp 16   Ht 1.67 m (5' 5.75\")   Wt 40.8 kg (89 lb 14.4 oz)   SpO2 96%   BMI 14.62 kg/m    Body mass index is 14.62 kg/m .  Physical Exam   GENERAL: alert and no distress  NECK: no adenopathy, no asymmetry, masses, or scars  RESP: lungs clear to auscultation - no rales, rhonchi or wheezes  CV: regular rate and rhythm, normal S1 S2, no S3 or S4, no murmur, click or rub, no peripheral edema  ABDOMEN: soft, nontender, no hepatosplenomegaly, no masses and bowel sounds normal  MS: no gross musculoskeletal defects noted, no edema  NEURO : CN 2-12 normal, no motor or sensory deficits.  Positive for scalp hematoma on the left lower part of " occipital region measuring around 2 to 3 cm in diameter.    Signed Electronically by: Loni Lamar MD

## 2025-02-27 NOTE — PATIENT INSTRUCTIONS
As discussed , please be aware that the BP is remaining high inspite of current Atenolol and Losartan 1/2 tablet ( which you dont remember whether you took whole last week ) >>   Request you to please send me BP log for next 10 days taking all the medications as prescribed without skipping the dosages     Placed referral to ENT as requested for possible ear issues causing dizziness.

## 2025-02-27 NOTE — LETTER
2025    Karen Sanchez   1940        To Whom it May Concern;    Please excuse Karen Sanchez from work/school for a healthcare visit on 2025.    Sincerely,        Loni Lamar MD

## 2025-02-27 NOTE — LETTER
[facility]  [address] [address]  Phone: [phone]    February 27, 2025        Karen Sanchez  [address] [place] [id number]          To whom it may concern:    RE: Karen Sanchez    Patient was seen and treated today at our clinic. Please excuse her daughter Kenisha Lopez # works at manfacturing plant and was with patient on 2/17/25 - 2/18/25 during recent hospitalization.     Please contact me for questions or concerns.    Sincerely,  Loni Lamar

## 2025-02-28 ENCOUNTER — TELEPHONE (OUTPATIENT)
Dept: FAMILY MEDICINE | Facility: CLINIC | Age: 85
End: 2025-02-28
Payer: COMMERCIAL

## 2025-02-28 NOTE — TELEPHONE ENCOUNTER
Forms/Letter Request    Type of form/letter: Work/FMLA detailed letter explaining need for care for mother through daughter (daughter doesn't have profile with [place])    Have you been seen for this request: N/A    Do we have the form/letter: No    When is form/letter needed by: asap    How would you like the form/letter returned: Kenisha email: [email or web address]    Patient Notified form requests are processed in 3-5 business days:Yes    Okay to leave a detailed message?: Yes at Other phone number: [phone].

## 2025-03-03 ENCOUNTER — TELEPHONE (OUTPATIENT)
Dept: FAMILY MEDICINE | Facility: CLINIC | Age: 85
End: 2025-03-03
Payer: COMMERCIAL

## 2025-03-03 NOTE — TELEPHONE ENCOUNTER
Forms/Letter Request     Type of form/letter:   Home Health Certification - Add on discipline - Physical Therapy - Order # [id number]     Do we have the form/letter: Yes, red folder, Dr. Lamar's inbox.     Who is the form from? [facility]     Where did/will the form come from? form was faxed in     When is form/letter needed by: As able     How would you like the form/letter returned:   Fax to [phone]      Yi Smallwood on 3/3/2025 at 2:03 PM

## 2025-03-03 NOTE — TELEPHONE ENCOUNTER
Forms/Letter Request    Type of form/letter:   Home Health Certification & Plan of Care - Order # [id number]    Do we have the form/letter: Yes, red folder, Dr. Lamar's inbox.    Who [facility] the form [facility]    Where did/will the form come from? form was faxed in    When is form/letter needed by: As able    How would you like the form/letter returned:   Fax to [phone]     Yi Smallwood on 3/3/2025 at 1:31 PM

## 2025-03-04 ENCOUNTER — TELEPHONE (OUTPATIENT)
Dept: FAMILY MEDICINE | Facility: CLINIC | Age: 85
End: 2025-03-04
Payer: COMMERCIAL

## 2025-03-04 ENCOUNTER — MEDICAL CORRESPONDENCE (OUTPATIENT)
Dept: HEALTH INFORMATION MANAGEMENT | Facility: CLINIC | Age: 85
End: 2025-03-04
Payer: COMMERCIAL

## 2025-03-04 DIAGNOSIS — Z53.9 DIAGNOSIS NOT YET DEFINED: Primary | ICD-10-CM

## 2025-03-04 PROCEDURE — G0180 MD CERTIFICATION HHA PATIENT: HCPCS | Performed by: INTERNAL MEDICINE

## 2025-03-04 NOTE — TELEPHONE ENCOUNTER
Forms/Letter Request     Type of form/letter:   Home Health Certification - Add on discipline - Order # [id number]     Do we have the form/letter: Yes, red folder, Dr. Lamar's inbox.     Who is the form from? [facility]     Where did/will the form come from? form was faxed in     When is form/letter needed by: As able     How would you like the form/letter returned:   Fax to [phone]      Yi Smallwood on 3/4/2025 at 12:25 PM

## 2025-03-05 NOTE — TELEPHONE ENCOUNTER
Forms signed by Dr. Lamar and faxed to [facility] at fax # [phone].    Forms faxed to [facility] and field team 3.     Yi Smallwood on 3/5/2025 at 11:17 AM

## 2025-03-05 NOTE — TELEPHONE ENCOUNTER
Forms signed by Dr. Lamar.     Forms faxed to [facility] at fax # [phone].     Forms faxed to [facility] and field team 3.      Yi Smallwood on 3/5/2025 at 11:31 AM

## 2025-03-05 NOTE — TELEPHONE ENCOUNTER
Forms signed by Dr. Lamar.      Forms faxed to [facility] at fax # [phone].     Forms faxed to [facility] and field team 3.      Yi Smallwood on 3/5/2025 at 11:31 AM

## 2025-03-13 LAB — CV ZIO PRELIM RESULTS: NORMAL

## 2025-03-18 ENCOUNTER — TELEPHONE (OUTPATIENT)
Dept: FAMILY MEDICINE | Facility: CLINIC | Age: 85
End: 2025-03-18
Payer: COMMERCIAL

## 2025-03-18 NOTE — TELEPHONE ENCOUNTER
BP remaining above goal.   Recommend to take Losartan as well ( which as per RN message below - I understand she is taking only Atenolol ? For this BP numbers ? )     Please confirm again = with home health RN & patient daughter Sallie as well before further increase in dose if she is taking Losartan or not ?.     Thank you  Loni Lamar MD on 3/18/2025 at 3:49 PM

## 2025-03-18 NOTE — TELEPHONE ENCOUNTER
Calling to report today's BP readings 2 minutes apart:  168/74, 174/82  Patient is asymptomatic.     Reviewed patient medications with Christopher. Patient should be taking Atenolol 50 mg BID and Losartan 12.5 mg (0.5 tab) daily.   Per Christopher, patient has been taking Atenolol 25 mg (0.5 tab) daily and doesn't think she has not been taking her Losartan.    Writer reviewed 2/27 OV instructions: Request you to please send me BP log for next 10 days taking all the medications as prescribed without skipping the dosages     Patient will try to follow the medication instructions and monitor BP readings. Christopher will discuss with Home Care RN if pt is having medications set up at SN visits, if not then will discuss starting this. Last SN visit was 3/13/25.

## 2025-03-19 NOTE — TELEPHONE ENCOUNTER
Left message with OT  home care Christopher.     Spoke with patient and she admits she is not taking the Losartan.     Patient was given Losartan medication directions.     Today she denies no symptoms.     Home care will visit her tomorrow.       No consent to speak with daughter on file.       Marisa Boo RN  [facility]

## 2025-03-24 ENCOUNTER — TELEPHONE (OUTPATIENT)
Dept: FAMILY MEDICINE | Facility: CLINIC | Age: 85
End: 2025-03-24
Payer: COMMERCIAL

## 2025-03-24 NOTE — TELEPHONE ENCOUNTER
Forms/Letter Request     Type of form/letter: Home Health Certification - Face to Face Confirmation Order - Order # [id number]     Do we have the form/letter: Yes, red folder, Dr. Lamar's inbox.     Who is the form from? [facility]     Where did/will the form come from? form was faxed in     When is form/letter needed by: As able     How would you like the form/letter returned:   Fax to [phone]      Yi Smallwood on 3/24/2025 at 12:50 PM

## 2025-03-25 ENCOUNTER — MEDICAL CORRESPONDENCE (OUTPATIENT)
Dept: HEALTH INFORMATION MANAGEMENT | Facility: CLINIC | Age: 85
End: 2025-03-25
Payer: COMMERCIAL

## 2025-03-26 NOTE — TELEPHONE ENCOUNTER
Form signed by Dr. Lamar and faxed to [facility] at fax # [phone].     Form faxed to [facility] and filed team 3.     Yi Smallwood on 3/26/2025 at 4:23 PM

## 2025-03-31 ENCOUNTER — TELEPHONE (OUTPATIENT)
Dept: FAMILY MEDICINE | Facility: CLINIC | Age: 85
End: 2025-03-31
Payer: COMMERCIAL

## 2025-03-31 NOTE — TELEPHONE ENCOUNTER
Forms/Letter Request     Type of form/letter:   Home Health Certification - Physician Order - Order # [id number]     Do we have the form/letter: Yes, red folder, Dr. Lamar's inbox.     Who is the form from? [facility]     Where did/will the form come from? form was faxed in     When is form/letter needed by: As able     How would you like the form/letter returned:   Fax to [phone]      Yi Smallwood on 3/31/2025 at 10:01 AM

## 2025-04-01 ENCOUNTER — MEDICAL CORRESPONDENCE (OUTPATIENT)
Dept: HEALTH INFORMATION MANAGEMENT | Facility: CLINIC | Age: 85
End: 2025-04-01
Payer: COMMERCIAL

## 2025-04-02 NOTE — TELEPHONE ENCOUNTER
Form signed by Dr. Lamar and faxed to [facility] at fax # [phone].      Form faxed to [facility] and filed team 3.      Yi Smallwood on 4/2/2025 at 5:42 PM

## 2025-04-21 ENCOUNTER — TELEPHONE (OUTPATIENT)
Dept: FAMILY MEDICINE | Facility: CLINIC | Age: 85
End: 2025-04-21
Payer: COMMERCIAL

## 2025-04-21 NOTE — TELEPHONE ENCOUNTER
Forms/Letter Request     Type of form/letter:   Home Health Certification - Physician Order - Order # [id number]     Do we have the form/letter: Yes, red folder, Dr. Lamar's inbox.     Who is the form from? [facility]     Where did/will the form come from? form was faxed in     When is form/letter needed by: As able     How would you like the form/letter returned:   Fax to [phone]      Yi Smallwood on 4/21/2025 at 2:41 PM

## 2025-04-22 ENCOUNTER — MEDICAL CORRESPONDENCE (OUTPATIENT)
Dept: HEALTH INFORMATION MANAGEMENT | Facility: CLINIC | Age: 85
End: 2025-04-22
Payer: COMMERCIAL

## 2025-04-22 NOTE — TELEPHONE ENCOUNTER
Forms signed and placed in my out box.        Please assist to schedule annual physical which patient is due in August 2025.    Thank you,  Loni Lamar MD on 4/22/2025

## 2025-04-23 ENCOUNTER — TELEPHONE (OUTPATIENT)
Dept: FAMILY MEDICINE | Facility: CLINIC | Age: 85
End: 2025-04-23
Payer: COMMERCIAL

## 2025-04-23 NOTE — TELEPHONE ENCOUNTER
Forms/Letter Request     Type of form/letter:   Home Health Certification - Physician Order - Order # [id number]     Do we have the form/letter: Yes, red folder, Dr. Lamar's inbox.     Who is the form from? [facility]     Where did/will the form come from? form was faxed in     When is form/letter needed by: As able     How would you like the form/letter returned:   Fax to [phone]      Yi Smallwood on 4/23/2025 at 11:21 AM

## 2025-04-23 NOTE — TELEPHONE ENCOUNTER
Forms/Letter Request     Type of form/letter:   Home Health Certification - Discharge from Agency - Order # [id number]     Do we have the form/letter: Yes, red folder, Dr. Lamar's inbox.     Who is the form from? [facility]     Where did/will the form come from? form was faxed in     When is form/letter needed by: As able     How would you like the form/letter returned:   Fax to [phone]     Yi Smallwood on 4/23/2025 at 11:19 AM

## 2025-04-23 NOTE — TELEPHONE ENCOUNTER
Form signed by Dr. Lamar and faxed to [facility] at fax # [phone].      Form faxed to [facility] and filed team 3.      Yi Smallwood on 4/23/2025 at 11:27 AM

## 2025-04-24 NOTE — TELEPHONE ENCOUNTER
Form signed by Dr. Lamar and faxed to [facility] at fax # [phone].      [facility]      Yi Smallwood on 4/24/2025 at 5:21 PM

## 2025-04-24 NOTE — TELEPHONE ENCOUNTER
Form signed by Dr. Lamar and faxed to [facility] at fax # [phone].      [facility].      Yi Smallwood on 4/24/2025 at 5:21 PM

## 2025-05-06 ENCOUNTER — MEDICAL CORRESPONDENCE (OUTPATIENT)
Dept: HEALTH INFORMATION MANAGEMENT | Facility: CLINIC | Age: 85
End: 2025-05-06
Payer: COMMERCIAL

## 2025-05-06 ENCOUNTER — TELEPHONE (OUTPATIENT)
Dept: FAMILY MEDICINE | Facility: CLINIC | Age: 85
End: 2025-05-06
Payer: COMMERCIAL

## 2025-05-06 NOTE — TELEPHONE ENCOUNTER
Please call the patient and schedule Annual physical with me, which patient is due in 8/2025, to further take care of the medical conditions and medications. OK to use same day slot / 8 AM on Tuesdays/Fridays in 1-2 weeks.     Forms signed and placed in my out box.  Please scan in patient's chart before doing the needful.    Thank you,  Loni Lamar MD on 5/6/2025 at 6:07 PM

## 2025-05-06 NOTE — TELEPHONE ENCOUNTER
Forms/Letter Request     Type of form/letter:   Home Health Certification - Physician Order - Order # [id number]     Do we have the form/letter: Yes, red folder, Dr. Lamar's inbox.     Who is the form from? [facility]     Where did/will the form come from? form was faxed in     When is form/letter needed by: As able     How would you like the form/letter returned:   Fax to [phone]     Yi Smallwood on 5/6/2025 at 12:17 PM

## 2025-05-07 NOTE — TELEPHONE ENCOUNTER
Form signed by Dr. Lamar.     Form faxed to [facility] at fax # [phone].    Form faxed to [facility] and filed Team 3.     Yi Smallwood on 5/7/2025 at 1:33 PM

## 2025-05-08 ENCOUNTER — MEDICAL CORRESPONDENCE (OUTPATIENT)
Dept: HEALTH INFORMATION MANAGEMENT | Facility: CLINIC | Age: 85
End: 2025-05-08
Payer: COMMERCIAL

## 2025-07-09 ENCOUNTER — OFFICE VISIT (OUTPATIENT)
Dept: OTOLARYNGOLOGY | Facility: CLINIC | Age: 85
End: 2025-07-09
Attending: INTERNAL MEDICINE
Payer: COMMERCIAL

## 2025-07-09 ENCOUNTER — OFFICE VISIT (OUTPATIENT)
Dept: AUDIOLOGY | Facility: CLINIC | Age: 85
End: 2025-07-09
Attending: INTERNAL MEDICINE
Payer: COMMERCIAL

## 2025-07-09 DIAGNOSIS — H90.3 SNHL (SENSORY-NEURAL HEARING LOSS), ASYMMETRICAL: Primary | ICD-10-CM

## 2025-07-09 DIAGNOSIS — H81.10 BENIGN PAROXYSMAL POSITIONAL VERTIGO, UNSPECIFIED LATERALITY: ICD-10-CM

## 2025-07-09 PROCEDURE — 92550 TYMPANOMETRY & REFLEX THRESH: CPT | Mod: 52 | Performed by: AUDIOLOGIST

## 2025-07-09 PROCEDURE — 92557 COMPREHENSIVE HEARING TEST: CPT | Performed by: AUDIOLOGIST

## 2025-07-09 ASSESSMENT — ENCOUNTER SYMPTOMS
EYES NEGATIVE: 1
VOMITING: 0
BLURRED VISION: 0
DOUBLE VISION: 0
GASTROINTESTINAL NEGATIVE: 1
CONSTITUTIONAL NEGATIVE: 1
RESPIRATORY NEGATIVE: 1
DIZZINESS: 0
FALLS: 0
NAUSEA: 0

## 2025-07-09 NOTE — LETTER
7/9/2025      Karen Sanchez  [address]  [facility] [address]      Dear Colleague,    Thank you for referring your patient, Karen Sanchez, to the [facility]. Please see a copy of my visit note below.    Chief Complaint   Patient presents with     Consult     Benign paroxysmal positional vertigo. Patient reports that in February 2025 she fell in her condo for an unknown reason. Patient seen in ED. Patient reports no falls or feeling of being unsteady since. Meclizine caused patient to have bursts of energy and only took one tablet.      PCP: Loni Lamar     Referring Provider: Loni Lamar    There were no vitals taken for this visit.    ENT Problem List:  Patient Active Problem List   Diagnosis     CARDIOVASCULAR SCREENING; LDL GOAL LESS THAN 130     Foot fracture, right     Fracture, humerus, right     Hypertension goal BP (blood pressure) < 140/90     Arm fracture, right     Contracture of right elbow     Osteoporosis     SVT (supraventricular tachycardia)     PVC (premature ventricular contraction)     Hypertension     Aortic valve disorder     NSTEMI (non-ST elevated myocardial infarction) (H)     Vestibular hypofunction      Current Medications:  Current Outpatient Medications   Medication Sig Dispense Refill     atenolol (TENORMIN) 50 MG tablet Take 1 tablet (50 mg) by mouth 2 times daily. (Patient taking differently: Take 50 mg by mouth daily.) 180 tablet 3     NONFORMULARY Place 1 drop into both eyes at bedtime. Over the counter eye drop for dry eyes prescribed after cataract surgery       Calcium Carbonate-Vitamin D (CALCIUM + D PO) Take by mouth twice a week (Patient not taking: Reported on 7/9/2025)       Cholecalciferol (VITAMIN D PO) Take by mouth twice a week (Patient not taking: Reported on 7/9/2025)       losartan (COZAAR) 25 MG tablet TAKE 1/2 TABLET(12.5 MG) BY MOUTH DAILY (Patient not taking: Reported on 7/9/2025) 45 tablet 1     meclizine  (ANTIVERT) 12.5 MG tablet Take 1 tablet (12.5 mg) by mouth 2 times daily as needed for dizziness. (Patient not taking: Reported on 7/9/2025) 20 tablet 0     No current facility-administered medications for this visit.     Surgical History:  Past Surgical History:   Procedure Laterality Date     OPEN REDUCTION INTERNAL FIXATION HUMERUS PROXIMAL  12/27/2011    Procedure:OPEN REDUCTION INTERNAL FIXATION HUMERUS PROXIMAL; OPEN REDUCTION INTERNAL FIXATION RIGHT PROXIMAL HUMERUS ; Surgeon:OCTAVIO WRIGHT; Location:[facility] OR     TONSILLECTOMY  as child     CT HEAD W/O CONTRAST 2/17/2025     INDICATION: fall, head trauma  COMPARISON: None.  TECHNIQUE: Routine CT Head without IV contrast. Multiplanar reformats. Dose reduction techniques were used.     FINDINGS:  INTRACRANIAL CONTENTS: No intracranial hemorrhage, extraaxial collection, or mass effect.  No CT evidence of acute infarct. Mild presumed chronic small vessel ischemic changes. Normal ventricles and sulci.      Left frontoparietal white matter patchy hypoattenuation nonspecific likely due to chronic infarcts.     VISUALIZED ORBITS/SINUSES/MASTOIDS: No intraorbital abnormality. No paranasal sinus mucosal disease. No middle ear or mastoid effusion.     BONES/SOFT TISSUES: No acute abnormality.                                                                   IMPRESSION:  1.  No acute intracranial process.     2.  Chronic intracranial changes described above.    CT CHEST PULMONARY EMBOLISM W CONTRAST 02/17/2025     INDICATION: Fall. Elevated D-dimer.  COMPARISON: None.  TECHNIQUE: CT chest pulmonary angiogram during arterial phase injection of IV contrast. Multiplanar reformats and MIP reconstructions were performed. Dose reduction techniques were used.   CONTRAST: 45 mL Isovue 370.     FINDINGS:  ANGIOGRAM CHEST: Pulmonary arteries are normal caliber and negative for pulmonary emboli. Thoracic aorta is negative for dissection. No CT evidence of right heart strain.      LUNGS AND PLEURA: Mild bronchial wall thickening and mosaic attenuation pattern throughout both lungs could indicate mild nonspecific bronchial inflammation and multifocal air trapping. Lungs otherwise clear. No pleural effusion. No pneumothorax.     MEDIASTINUM/AXILLAE: Heart size normal. No pericardial effusion. No lymphadenopathy. Benign calcified left hilar lymph nodes.     CORONARY ARTERY CALCIFICATION: Mild calcified atheromatous plaque LAD and circumflex coronary arteries.     UPPER ABDOMEN: Unremarkable.     MUSCULOSKELETAL: No acute fracture. Bones are demineralized. Healed fracture proximal right humerus status post ORIF with plate and screw. Mild chronic vertebral body compression T3 and T6.                                                                      IMPRESSION:  1.  No pulmonary emboli. No acute aortic syndrome.  2.  CT signs of potential mild nonspecific bronchial inflammation and multifocal air trapping.  3.  Mild calcified atheromatous plaque LAD and circumflex coronary arteries.  4.  Bones are demineralized with mild chronic vertebral body compression T3 and T6.    HPI  Pleasant 84 year old female presents today as a(n) new patient for BPPV. Patient reports that in February 2025 she fell in her condo for an unknown reason and was seen in ED. She states that she was suddenly on the floor, so she thinks she briefly could have lost consciousness. She does not think she hit her head, but if she did it would be on the back of her head based on how she fell. She was admitted in the hospital for a couple of days after her fall, and she had no issues or fractures after she fell. She reports no falls or feeling of being unsteady since, or a significant history of falling often. Meclizine caused her to have bursts of energy and she only took one tablet.   She states that she has a hx of multiple ear infections in her left ear as a child.  She has hearing loss and states that she has to repeat  herself often.  She has a hx of a tonsillectomy as a child.  She does not have a pacemaker, but she has a philomena due to a hx of arm fracture.    She denies nausea/vomiting, dizziness/vertigo, aural fullness/vertigo, tinnitus, heart problems, blurry vision, double vision.    Review of Systems   Constitutional: Negative.    HENT:  Positive for hearing loss. Negative for congestion and tinnitus.    Eyes: Negative.  Negative for blurred vision and double vision.   Respiratory: Negative.     Gastrointestinal: Negative.  Negative for nausea and vomiting.   Musculoskeletal:  Negative for falls.   Skin: Negative.    Neurological:  Negative for dizziness.   Endo/Heme/Allergies: Negative.        Physical Exam  Vitals and nursing note reviewed.   Constitutional:       Appearance: Normal appearance.   HENT:      Head: Normocephalic and atraumatic.      Jaw: There is normal jaw occlusion.      Right Ear: Tympanic membrane and ear canal normal. Decreased hearing noted.      Left Ear: Tympanic membrane and ear canal normal. Decreased hearing noted.      Ears:      Comments: Bilateral ears were examined under the microscope     Nose: No mucosal edema, congestion or rhinorrhea.      Right Nostril: No occlusion.      Left Nostril: No occlusion.      Right Turbinates: Not enlarged or swollen.      Left Turbinates: Not enlarged or swollen.      Right Sinus: No maxillary sinus tenderness or frontal sinus tenderness.      Left Sinus: No maxillary sinus tenderness or frontal sinus tenderness.      Mouth/Throat:      Mouth: Mucous membranes are moist.      Pharynx: Oropharynx is clear. Uvula midline.   Eyes:      Extraocular Movements: Extraocular movements intact.      Pupils: Pupils are equal, round, and reactive to light.   Neurological:      Mental Status: She is alert.       AUDIOGRAM: The patient underwent an audiogram today.  Right: Speech reception threshold is 60 dB with 28% word recognition. Tympanogram A type.  Left: Speech  reception threshold is 50 dB with 88% word recognition. Tympanogram As type.    Hx: Pt reports recent fall. Denies any recent dizziness. Denies otalgia, aural fullness, otorrhea, tinnitus, and tinnitus.   Results: Mild sloping to profound SNHL right. Mild sloping to severe SNHL left. 28% word rec. right, 88% left. Right tymp WNL. Shallow tymp left. Reflexes as marked.     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 Audiogram and imaging independently reviewed and discussed in detail with the patient.    Regarding asymmetrical SNHL, worse in the right ear, an MR brain wo & w contrast was ordered for further investigation. She will receive a phone call to review the results of this test and future potential treatment options. Hearing aids will be considered based on the results of her MR brain wo & w contrast, and if this is the case she will be referred to audiology for a hearing aid evaluation.    Follow up in clinic prn pending imaging, MR brain wo & w contrast, results.    Scribe/Staff:    Scribe Disclosure:   I, Rani Burris, am serving as a scribe; to document services personally performed by Mazin Mayberry MD based on data collection and the provider's statements to me.     Insert provider disclosure and electronic signature here Provider Disclosure:  I agree with above History, Review of Systems, Physical exam and Plan.  I have reviewed the content of the documentation and have edited it as needed. I have personally performed the services documented here and the documentation accurately represents those services and the decisions I have made.    Mazin Mayberry MD       Again, thank you for allowing me to participate in the care of your patient.        Sincerely,        Mazin Mayberry MD    Electronically signed

## 2025-07-09 NOTE — PROGRESS NOTES
AUDIOLOGY REPORT    SUMMARY: Audiology visit completed. See audiogram for results.    RECOMMENDATIONS: Follow-up with ENT.    Lidia Moody  Doctor of Audiology  [place] License # [id number]

## 2025-07-09 NOTE — PROGRESS NOTES
Chief Complaint   Patient presents with    Consult     Benign paroxysmal positional vertigo. Patient reports that in February 2025 she fell in her condo for an unknown reason. Patient seen in ED. Patient reports no falls or feeling of being unsteady since. Meclizine caused patient to have bursts of energy and only took one tablet.      PCP: Loni Lamar     Referring Provider: Loni Lamar    There were no vitals taken for this visit.    ENT Problem List:  Patient Active Problem List   Diagnosis    CARDIOVASCULAR SCREENING; LDL GOAL LESS THAN 130    Foot fracture, right    Fracture, humerus, right    Hypertension goal BP (blood pressure) < 140/90    Arm fracture, right    Contracture of right elbow    Osteoporosis    SVT (supraventricular tachycardia)    PVC (premature ventricular contraction)    Hypertension    Aortic valve disorder    NSTEMI (non-ST elevated myocardial infarction) (H)    Vestibular hypofunction      Current Medications:  Current Outpatient Medications   Medication Sig Dispense Refill    atenolol (TENORMIN) 50 MG tablet Take 1 tablet (50 mg) by mouth 2 times daily. (Patient taking differently: Take 50 mg by mouth daily.) 180 tablet 3    NONFORMULARY Place 1 drop into both eyes at bedtime. Over the counter eye drop for dry eyes prescribed after cataract surgery      Calcium Carbonate-Vitamin D (CALCIUM + D PO) Take by mouth twice a week (Patient not taking: Reported on 7/9/2025)      Cholecalciferol (VITAMIN D PO) Take by mouth twice a week (Patient not taking: Reported on 7/9/2025)      losartan (COZAAR) 25 MG tablet TAKE 1/2 TABLET(12.5 MG) BY MOUTH DAILY (Patient not taking: Reported on 7/9/2025) 45 tablet 1    meclizine (ANTIVERT) 12.5 MG tablet Take 1 tablet (12.5 mg) by mouth 2 times daily as needed for dizziness. (Patient not taking: Reported on 7/9/2025) 20 tablet 0     No current facility-administered medications for this visit.     Surgical History:  Past Surgical History:    Procedure Laterality Date    OPEN REDUCTION INTERNAL FIXATION HUMERUS PROXIMAL  12/27/2011    Procedure:OPEN REDUCTION INTERNAL FIXATION HUMERUS PROXIMAL; OPEN REDUCTION INTERNAL FIXATION RIGHT PROXIMAL HUMERUS ; Surgeon:OCTAVIO WRIGHT; Location:[facility] OR    TONSILLECTOMY  as child     CT HEAD W/O CONTRAST 2/17/2025     INDICATION: fall, head trauma  COMPARISON: None.  TECHNIQUE: Routine CT Head without IV contrast. Multiplanar reformats. Dose reduction techniques were used.     FINDINGS:  INTRACRANIAL CONTENTS: No intracranial hemorrhage, extraaxial collection, or mass effect.  No CT evidence of acute infarct. Mild presumed chronic small vessel ischemic changes. Normal ventricles and sulci.      Left frontoparietal white matter patchy hypoattenuation nonspecific likely due to chronic infarcts.     VISUALIZED ORBITS/SINUSES/MASTOIDS: No intraorbital abnormality. No paranasal sinus mucosal disease. No middle ear or mastoid effusion.     BONES/SOFT TISSUES: No acute abnormality.                                                                   IMPRESSION:  1.  No acute intracranial process.     2.  Chronic intracranial changes described above.    CT CHEST PULMONARY EMBOLISM W CONTRAST 02/17/2025     INDICATION: Fall. Elevated D-dimer.  COMPARISON: None.  TECHNIQUE: CT chest pulmonary angiogram during arterial phase injection of IV contrast. Multiplanar reformats and MIP reconstructions were performed. Dose reduction techniques were used.   CONTRAST: 45 mL Isovue 370.     FINDINGS:  ANGIOGRAM CHEST: Pulmonary arteries are normal caliber and negative for pulmonary emboli. Thoracic aorta is negative for dissection. No CT evidence of right heart strain.     LUNGS AND PLEURA: Mild bronchial wall thickening and mosaic attenuation pattern throughout both lungs could indicate mild nonspecific bronchial inflammation and multifocal air trapping. Lungs otherwise clear. No pleural effusion. No pneumothorax.      MEDIASTINUM/AXILLAE: Heart size normal. No pericardial effusion. No lymphadenopathy. Benign calcified left hilar lymph nodes.     CORONARY ARTERY CALCIFICATION: Mild calcified atheromatous plaque LAD and circumflex coronary arteries.     UPPER ABDOMEN: Unremarkable.     MUSCULOSKELETAL: No acute fracture. Bones are demineralized. Healed fracture proximal right humerus status post ORIF with plate and screw. Mild chronic vertebral body compression T3 and T6.                                                                      IMPRESSION:  1.  No pulmonary emboli. No acute aortic syndrome.  2.  CT signs of potential mild nonspecific bronchial inflammation and multifocal air trapping.  3.  Mild calcified atheromatous plaque LAD and circumflex coronary arteries.  4.  Bones are demineralized with mild chronic vertebral body compression T3 and T6.    HPI  Pleasant 84 year old female presents today as a(n) new patient for BPPV. Patient reports that in February 2025 she fell in her condo for an unknown reason and was seen in ED. She states that she was suddenly on the floor, so she thinks she briefly could have lost consciousness. She does not think she hit her head, but if she did it would be on the back of her head based on how she fell. She was admitted in the hospital for a couple of days after her fall, and she had no issues or fractures after she fell. She reports no falls or feeling of being unsteady since, or a significant history of falling often. Meclizine caused her to have bursts of energy and she only took one tablet.   She states that she has a hx of multiple ear infections in her left ear as a child.  She has hearing loss and states that she has to repeat herself often.  She has a hx of a tonsillectomy as a child.  She does not have a pacemaker, but she has a philomena due to a hx of arm fracture.    She denies nausea/vomiting, dizziness/vertigo, aural fullness/vertigo, tinnitus, heart problems, blurry vision,  double vision.    Review of Systems   Constitutional: Negative.    HENT:  Positive for hearing loss. Negative for congestion and tinnitus.    Eyes: Negative.  Negative for blurred vision and double vision.   Respiratory: Negative.     Gastrointestinal: Negative.  Negative for nausea and vomiting.   Musculoskeletal:  Negative for falls.   Skin: Negative.    Neurological:  Negative for dizziness.   Endo/Heme/Allergies: Negative.        Physical Exam  Vitals and nursing note reviewed.   Constitutional:       Appearance: Normal appearance.   HENT:      Head: Normocephalic and atraumatic.      Jaw: There is normal jaw occlusion.      Right Ear: Tympanic membrane and ear canal normal. Decreased hearing noted.      Left Ear: Tympanic membrane and ear canal normal. Decreased hearing noted.      Ears:      Comments: Bilateral ears were examined under the microscope     Nose: No mucosal edema, congestion or rhinorrhea.      Right Nostril: No occlusion.      Left Nostril: No occlusion.      Right Turbinates: Not enlarged or swollen.      Left Turbinates: Not enlarged or swollen.      Right Sinus: No maxillary sinus tenderness or frontal sinus tenderness.      Left Sinus: No maxillary sinus tenderness or frontal sinus tenderness.      Mouth/Throat:      Mouth: Mucous membranes are moist.      Pharynx: Oropharynx is clear. Uvula midline.   Eyes:      Extraocular Movements: Extraocular movements intact.      Pupils: Pupils are equal, round, and reactive to light.   Neurological:      Mental Status: She is alert.       AUDIOGRAM: The patient underwent an audiogram today.  Right: Speech reception threshold is 60 dB with 28% word recognition. Tympanogram A type.  Left: Speech reception threshold is 50 dB with 88% word recognition. Tympanogram As type.    Hx: Pt reports recent fall. Denies any recent dizziness. Denies otalgia, aural fullness, otorrhea, tinnitus, and tinnitus.   Results: Mild sloping to profound SNHL right. Mild  sloping to severe SNHL left. 28% word rec. right, 88% left. Right tymp WNL. Shallow tymp left. Reflexes as marked.     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 Audiogram and imaging independently reviewed and discussed in detail with the patient.    Regarding asymmetrical SNHL, worse in the right ear, an MR brain wo & w contrast was ordered for further investigation. She will receive a phone call to review the results of this test and future potential treatment options. Hearing aids will be considered based on the results of her MR brain wo & w contrast, and if this is the case she will be referred to audiology for a hearing aid evaluation.    Follow up in clinic prn pending imaging, MR brain wo & w contrast, results.    Scribe/Staff:    Scribe Disclosure:   I, Rani Burris, am serving as a scribe; to document services personally performed by Mazin Mayberry MD based on data collection and the provider's statements to me.     Insert provider disclosure and electronic signature here Provider Disclosure:  I agree with above History, Review of Systems, Physical exam and Plan.  I have reviewed the content of the documentation and have edited it as needed. I have personally performed the services documented here and the documentation accurately represents those services and the decisions I have made.    Mazin Mayberry MD

## 2025-07-09 NOTE — NURSING NOTE
Karen Sanchez's goals for this visit include:   Chief Complaint   Patient presents with    Consult     Benign paroxysmal positional vertigo. Patient reports that in February 2025 she fell in her condo for an unknown reason. Patient seen in ED. Patient reports no falls or feeling of being unsteady since. Meclizine caused patient to have bursts of energy and only took one tablet.       She requests these members of her care team be copied on today's visit information:     PCP: Loni Lamar    Referring Provider:  Loni Lamar MD  [address] [place] [address]    There were no vitals taken for this visit.    Do you need any medication refills at today's visit? No  Dawn Snyder [facility]

## 2025-07-26 ENCOUNTER — ANCILLARY PROCEDURE (OUTPATIENT)
Dept: MRI IMAGING | Facility: CLINIC | Age: 85
End: 2025-07-26
Attending: OTOLARYNGOLOGY
Payer: COMMERCIAL

## 2025-07-26 DIAGNOSIS — H90.3 SNHL (SENSORY-NEURAL HEARING LOSS), ASYMMETRICAL: ICD-10-CM

## 2025-07-26 PROCEDURE — A9585 GADOBUTROL INJECTION: HCPCS | Performed by: PREVENTIVE MEDICINE

## 2025-07-26 PROCEDURE — 70553 MRI BRAIN STEM W/O & W/DYE: CPT | Performed by: PREVENTIVE MEDICINE

## 2025-07-26 RX ORDER — GADOBUTROL 604.72 MG/ML
4 INJECTION INTRAVENOUS ONCE
Status: COMPLETED | OUTPATIENT
Start: 2025-07-26 | End: 2025-07-26

## 2025-07-26 RX ADMIN — GADOBUTROL 4 ML: 604.72 INJECTION INTRAVENOUS at 10:35

## 2025-08-06 ENCOUNTER — TELEPHONE (OUTPATIENT)
Dept: NEUROSURGERY | Facility: CLINIC | Age: 85
End: 2025-08-06
Payer: COMMERCIAL

## 2025-09-11 ENCOUNTER — PRE VISIT (OUTPATIENT)
Dept: OTOLARYNGOLOGY | Facility: CLINIC | Age: 85
End: 2025-09-11